# Patient Record
Sex: FEMALE | Race: WHITE | Employment: OTHER | URBAN - METROPOLITAN AREA
[De-identification: names, ages, dates, MRNs, and addresses within clinical notes are randomized per-mention and may not be internally consistent; named-entity substitution may affect disease eponyms.]

---

## 2017-02-09 ENCOUNTER — OFFICE VISIT (OUTPATIENT)
Dept: FAMILY MEDICINE CLINIC | Facility: CLINIC | Age: 57
End: 2017-02-09

## 2017-02-09 VITALS
WEIGHT: 126 LBS | SYSTOLIC BLOOD PRESSURE: 132 MMHG | RESPIRATION RATE: 12 BRPM | DIASTOLIC BLOOD PRESSURE: 74 MMHG | HEART RATE: 72 BPM | TEMPERATURE: 98 F | BODY MASS INDEX: 22.05 KG/M2 | HEIGHT: 63.25 IN

## 2017-02-09 DIAGNOSIS — Z12.31 ENCOUNTER FOR SCREENING MAMMOGRAM FOR MALIGNANT NEOPLASM OF BREAST: ICD-10-CM

## 2017-02-09 DIAGNOSIS — R00.2 PALPITATIONS: ICD-10-CM

## 2017-02-09 DIAGNOSIS — R07.9 CHEST PAIN, UNSPECIFIED TYPE: Primary | ICD-10-CM

## 2017-02-09 DIAGNOSIS — G47.00 INSOMNIA, UNSPECIFIED: ICD-10-CM

## 2017-02-09 DIAGNOSIS — R42 DIZZINESS: ICD-10-CM

## 2017-02-09 PROCEDURE — 93000 ELECTROCARDIOGRAM COMPLETE: CPT | Performed by: FAMILY MEDICINE

## 2017-02-09 PROCEDURE — 99214 OFFICE O/P EST MOD 30 MIN: CPT | Performed by: FAMILY MEDICINE

## 2017-02-09 RX ORDER — ZOLPIDEM TARTRATE 10 MG/1
10 TABLET ORAL NIGHTLY PRN
Qty: 90 TABLET | Refills: 0 | Status: CANCELLED | OUTPATIENT
Start: 2017-02-09

## 2017-02-09 RX ORDER — ZOLPIDEM TARTRATE 10 MG/1
10 TABLET ORAL NIGHTLY PRN
Qty: 90 TABLET | Refills: 0 | Status: SHIPPED | OUTPATIENT
Start: 2017-02-09 | End: 2018-01-26

## 2017-02-09 NOTE — PROGRESS NOTES
Louis Vila is a 60year old female.   CHIEF COMPLAINT   Dizzy and chest pain  HPI:   Dizzy off and on for 5 years - feels off balance when it happens - lasts less than 5 minutes - isn't really able to really verbalize well what she feels when feels the /74 mmHg  Pulse 72  Temp(Src) 98.3 °F (36.8 °C)  Resp 12  Ht 63.25\"  Wt 126 lb  BMI 22.13 kg/m2  GENERAL: well developed, well nourished,in no apparent distress  SKIN: no rashes,no suspicious lesions  NECK: supple,no adenopathy  LUNGS: clear to au

## 2017-02-10 ENCOUNTER — HOSPITAL ENCOUNTER (OUTPATIENT)
Dept: MAMMOGRAPHY | Age: 57
Discharge: HOME | End: 2017-02-10
Attending: FAMILY MEDICINE
Payer: COMMERCIAL

## 2017-02-10 ENCOUNTER — HOSPITAL ENCOUNTER (OUTPATIENT)
Dept: BONE DENSITY | Age: 57
Discharge: HOME | End: 2017-02-10
Attending: FAMILY MEDICINE
Payer: COMMERCIAL

## 2017-02-10 ENCOUNTER — TELEPHONE (OUTPATIENT)
Dept: FAMILY MEDICINE CLINIC | Facility: CLINIC | Age: 57
End: 2017-02-10

## 2017-02-10 DIAGNOSIS — Z13.820 SCREENING FOR OSTEOPOROSIS: ICD-10-CM

## 2017-02-10 DIAGNOSIS — Z12.39 BREAST CANCER SCREENING: ICD-10-CM

## 2017-02-10 PROCEDURE — 77080 DXA BONE DENSITY AXIAL: CPT

## 2017-02-10 PROCEDURE — 77067 SCR MAMMO BI INCL CAD: CPT

## 2017-02-10 NOTE — TELEPHONE ENCOUNTER
Pt was sen yesterday. We called in Zolpidem Tartrate 10 MG Oral Tab to [facility]. The cost is to much so PT cancelled RX. She would like us to call it in to Petey Gomes on file. She also stated [facility] has requested we include a pharmacy cover letter.

## 2017-02-16 ENCOUNTER — TELEPHONE (OUTPATIENT)
Dept: FAMILY MEDICINE CLINIC | Facility: CLINIC | Age: 57
End: 2017-02-16

## 2017-02-16 NOTE — TELEPHONE ENCOUNTER
To call pt when we received her paper chart requested for blood type-A positive. Per pt's request she will  a copy at her next ov 02/21/2017.

## 2017-02-22 ENCOUNTER — HOSPITAL ENCOUNTER (OUTPATIENT)
Dept: CV DIAGNOSTICS | Facility: HOSPITAL | Age: 57
Discharge: HOME | End: 2017-02-22
Attending: FAMILY MEDICINE
Payer: COMMERCIAL

## 2017-02-22 ENCOUNTER — HOSPITAL ENCOUNTER (OUTPATIENT)
Dept: LAB | Facility: HOSPITAL | Age: 57
Discharge: HOME | End: 2017-02-22
Attending: FAMILY MEDICINE
Payer: COMMERCIAL

## 2017-02-22 DIAGNOSIS — R07.9 CHEST PAIN, UNSPECIFIED TYPE: ICD-10-CM

## 2017-02-22 DIAGNOSIS — R00.2 PALPITATIONS: ICD-10-CM

## 2017-02-22 DIAGNOSIS — Z00.00 BLOOD TESTS FOR ROUTINE GENERAL PHYSICAL EXAMINATION: ICD-10-CM

## 2017-02-22 DIAGNOSIS — E55.9 VITAMIN D DEFICIENCY: ICD-10-CM

## 2017-02-22 LAB
ALBUMIN SERPL-MCNC: 3.9 G/DL (ref 3.5–4.8)
ALP LIVER SERPL-CCNC: 86 U/L (ref 46–118)
ALT SERPL-CCNC: 27 U/L (ref 14–54)
AST SERPL-CCNC: 21 U/L (ref 15–41)
BILIRUB SERPL-MCNC: 0.5 MG/DL (ref 0.1–2)
BUN BLD-MCNC: 15 MG/DL (ref 8–20)
CALCIUM BLD-MCNC: 9 MG/DL (ref 8.3–10.3)
CHLORIDE: 102 MMOL/L (ref 101–111)
CHOLEST SMN-MCNC: 178 MG/DL (ref ?–200)
CO2: 30 MMOL/L (ref 22–32)
CREAT BLD-MCNC: 0.74 MG/DL (ref 0.55–1.02)
ERYTHROCYTE [DISTWIDTH] IN BLOOD BY AUTOMATED COUNT: 12.4 % (ref 11.5–16)
GLUCOSE BLD-MCNC: 95 MG/DL (ref 70–99)
HCT VFR BLD AUTO: 42.3 % (ref 34–50)
HDLC SERPL-MCNC: 73 MG/DL (ref 45–?)
HDLC SERPL: 2.44 {RATIO} (ref ?–4.44)
HGB BLD-MCNC: 14.3 G/DL (ref 12–16)
LDLC SERPL CALC-MCNC: 91 MG/DL (ref ?–130)
M PROTEIN MFR SERPL ELPH: 7.3 G/DL (ref 6.1–8.3)
MCH RBC QN AUTO: 31.6 PG (ref 27–33.2)
MCHC RBC AUTO-ENTMCNC: 33.8 G/DL (ref 31–37)
MCV RBC AUTO: 93.4 FL (ref 81–100)
NONHDLC SERPL-MCNC: 105 MG/DL (ref ?–130)
PLATELET # BLD AUTO: 306 10(3)UL (ref 150–450)
POTASSIUM SERPL-SCNC: 4.2 MMOL/L (ref 3.6–5.1)
RBC # BLD AUTO: 4.53 X10(6)UL (ref 3.8–5.1)
RED CELL DISTRIBUTION WIDTH-SD: 42.9 FL (ref 35.1–46.3)
SODIUM SERPL-SCNC: 138 MMOL/L (ref 136–144)
TRIGLYCERIDES: 72 MG/DL (ref ?–150)
TSI SER-ACNC: 1.41 MIU/ML (ref 0.35–5.5)
VLDL: 14 MG/DL (ref 5–40)
WBC # BLD AUTO: 4.9 X10(3) UL (ref 4–13)

## 2017-02-22 PROCEDURE — 93227 XTRNL ECG REC<48 HR R&I: CPT | Performed by: INTERNAL MEDICINE

## 2017-02-22 PROCEDURE — 93350 STRESS TTE ONLY: CPT

## 2017-02-22 PROCEDURE — 93017 CV STRESS TEST TRACING ONLY: CPT

## 2017-02-22 PROCEDURE — 80053 COMPREHEN METABOLIC PANEL: CPT

## 2017-02-22 PROCEDURE — 85027 COMPLETE CBC AUTOMATED: CPT

## 2017-02-22 PROCEDURE — 84443 ASSAY THYROID STIM HORMONE: CPT

## 2017-02-22 PROCEDURE — 93226 XTRNL ECG REC<48 HR SCAN A/R: CPT

## 2017-02-22 PROCEDURE — 36415 COLL VENOUS BLD VENIPUNCTURE: CPT

## 2017-02-22 PROCEDURE — 80061 LIPID PANEL: CPT

## 2017-02-22 PROCEDURE — 93225 XTRNL ECG REC<48 HRS REC: CPT

## 2017-02-22 PROCEDURE — 93350 STRESS TTE ONLY: CPT | Performed by: INTERNAL MEDICINE

## 2017-02-22 PROCEDURE — 82306 VITAMIN D 25 HYDROXY: CPT

## 2017-02-22 PROCEDURE — 93018 CV STRESS TEST I&R ONLY: CPT | Performed by: INTERNAL MEDICINE

## 2017-02-23 DIAGNOSIS — E55.9 VITAMIN D DEFICIENCY: Primary | ICD-10-CM

## 2017-02-23 LAB — 25-HYDROXYVITAMIN D (TOTAL): 26.3 NG/ML (ref 30–100)

## 2017-02-27 ENCOUNTER — TELEPHONE (OUTPATIENT)
Dept: FAMILY MEDICINE CLINIC | Facility: CLINIC | Age: 57
End: 2017-02-27

## 2017-02-27 DIAGNOSIS — R00.2 PALPITATIONS: ICD-10-CM

## 2017-02-27 DIAGNOSIS — R07.9 CHEST PAIN, UNSPECIFIED TYPE: Primary | ICD-10-CM

## 2017-02-28 ENCOUNTER — OFFICE VISIT (OUTPATIENT)
Dept: FAMILY MEDICINE CLINIC | Facility: CLINIC | Age: 57
End: 2017-02-28

## 2017-02-28 DIAGNOSIS — M81.0 OSTEOPOROSIS: Primary | ICD-10-CM

## 2017-02-28 PROCEDURE — 99214 OFFICE O/P EST MOD 30 MIN: CPT | Performed by: FAMILY MEDICINE

## 2017-02-28 NOTE — PATIENT INSTRUCTIONS
Zoledronic Acid injection (Paget's Disease, Osteoporosis)  What is this medicine? ZOLEDRONIC ACID (HANSA lynn AS id) lowers the amount of calcium loss from bone. It is used to treat Paget's disease and osteoporosis in women.   How should I use this m This drug is given in a hospital or clinic and will not be stored at home. What should I tell my health care provider before I take this medicine?   They need to know if you have any of these conditions:  · aspirin-sensitive asthma  · cancer, especially if Tell your dentist and dental surgeon that you are taking this medicine. You should not have major dental surgery while on this medicine. See your dentist to have a dental exam and fix any dental problems before starting this medicine.  Take good care of you If you miss a dose, take it as soon as you can. If it is almost time for your next dose, take only that dose. Do not take double or extra doses. Where should I keep my medicine? Keep out of the reach of children.   Store at room temperature between 15 and This medicine does not prevent hot flashes. It may cause hot flashes in some patients at the start of therapy. Date Last Reviewed:   NOTE:This sheet is a summary. It may not cover all possible information.  If you have questions about this medicine, talk t

## 2017-02-28 NOTE — PROGRESS NOTES
Kojo Cabral is a 60year old female. CHIEF COMPLAINT   Review dexa scan  HPI:   In about 2006, tried fosamax daily. Couldn't tolerate - caused vomiting.     Thinks she tried actonel also when it was once daily (knows for sure she tried a second oral -

## 2017-03-02 ENCOUNTER — TELEPHONE (OUTPATIENT)
Dept: FAMILY MEDICINE CLINIC | Facility: CLINIC | Age: 57
End: 2017-03-02

## 2017-03-02 ENCOUNTER — TELEPHONE (OUTPATIENT)
Dept: HEMATOLOGY/ONCOLOGY | Facility: HOSPITAL | Age: 57
End: 2017-03-02

## 2017-03-02 VITALS
HEIGHT: 63.25 IN | SYSTOLIC BLOOD PRESSURE: 122 MMHG | HEART RATE: 72 BPM | WEIGHT: 125 LBS | DIASTOLIC BLOOD PRESSURE: 76 MMHG | TEMPERATURE: 98 F | RESPIRATION RATE: 16 BRPM | BODY MASS INDEX: 21.87 KG/M2

## 2017-03-02 PROBLEM — M81.0 SENILE OSTEOPOROSIS: Status: ACTIVE | Noted: 2017-03-02

## 2017-03-02 NOTE — TELEPHONE ENCOUNTER
Called pt. Back and gave her the codes CPT codes for injection. She will be contacting her insurance company.

## 2017-03-02 NOTE — TELEPHONE ENCOUNTER
Pt called asking about her pre authorization that she had been waiting on. She said the hospital was calling her to schedule her surgery, but before she does that, she would like to know how much the insurance agreed to cover, and what her copy will be.  Pl

## 2017-03-07 ENCOUNTER — PRIOR ORIGINAL RECORDS (OUTPATIENT)
Dept: OTHER | Age: 57
End: 2017-03-07

## 2017-03-09 ENCOUNTER — TELEPHONE (OUTPATIENT)
Dept: HEMATOLOGY/ONCOLOGY | Facility: HOSPITAL | Age: 57
End: 2017-03-09

## 2017-03-10 ENCOUNTER — OFFICE VISIT (OUTPATIENT)
Dept: HEMATOLOGY/ONCOLOGY | Facility: HOSPITAL | Age: 57
End: 2017-03-10
Attending: FAMILY MEDICINE
Payer: COMMERCIAL

## 2017-03-10 VITALS
HEART RATE: 74 BPM | RESPIRATION RATE: 16 BRPM | OXYGEN SATURATION: 100 % | WEIGHT: 126.63 LBS | DIASTOLIC BLOOD PRESSURE: 75 MMHG | SYSTOLIC BLOOD PRESSURE: 132 MMHG | TEMPERATURE: 98 F | BODY MASS INDEX: 22 KG/M2

## 2017-03-10 DIAGNOSIS — M81.0 SENILE OSTEOPOROSIS: ICD-10-CM

## 2017-03-10 DIAGNOSIS — M81.0 OSTEOPOROSIS: Primary | ICD-10-CM

## 2017-03-10 LAB
ALBUMIN SERPL-MCNC: 3.9 G/DL (ref 3.5–4.8)
CALCIUM BLD-MCNC: 9 MG/DL (ref 8.3–10.3)
CREAT BLD-MCNC: 0.76 MG/DL (ref 0.55–1.02)

## 2017-03-10 PROCEDURE — 82565 ASSAY OF CREATININE: CPT

## 2017-03-10 PROCEDURE — 82040 ASSAY OF SERUM ALBUMIN: CPT

## 2017-03-10 PROCEDURE — 96365 THER/PROPH/DIAG IV INF INIT: CPT

## 2017-03-10 PROCEDURE — 82310 ASSAY OF CALCIUM: CPT

## 2017-03-10 RX ORDER — ZOLEDRONIC ACID 5 MG/100ML
5 INJECTION, SOLUTION INTRAVENOUS ONCE
Status: COMPLETED | OUTPATIENT
Start: 2017-03-10 | End: 2017-03-10

## 2017-03-10 RX ADMIN — ZOLEDRONIC ACID 5 MG: 5 INJECTION, SOLUTION INTRAVENOUS at 10:05:00

## 2017-03-10 NOTE — PATIENT INSTRUCTIONS
Zoledronic Acid injection (Paget's Disease, Osteoporosis)  What is this medicine? ZOLEDRONIC ACID (HANSA lynn AS id) lowers the amount of calcium loss from bone. It is used to treat Paget's disease and osteoporosis in women.   How should I use this m This drug is given in a hospital or clinic and will not be stored at home. What should I tell my health care provider before I take this medicine?   They need to know if you have any of these conditions:  · aspirin-sensitive asthma  · cancer, especially if Tell your dentist and dental surgeon that you are taking this medicine. You should not have major dental surgery while on this medicine. See your dentist to have a dental exam and fix any dental problems before starting this medicine.  Take good care of you

## 2017-03-10 NOTE — PROGRESS NOTES
Pt arrived for infusion of reclast, pt states has stage 2 osteoporosis, pt unable to to take oral meds do to severe stomach problems when taking, pt denies any adverse S/S currently and appears alert and in nad, pt ambulates with steady gait, unable to DR DAMON [facility]

## 2017-04-24 ENCOUNTER — TELEPHONE (OUTPATIENT)
Dept: FAMILY MEDICINE CLINIC | Facility: CLINIC | Age: 57
End: 2017-04-24

## 2017-04-24 RX ORDER — METHYLPREDNISOLONE 4 MG/1
TABLET ORAL
Qty: 1 KIT | Refills: 0 | Status: SHIPPED | OUTPATIENT
Start: 2017-04-24 | End: 2018-01-26 | Stop reason: ALTCHOICE

## 2017-04-24 RX ORDER — MOMETASONE FUROATE 1 MG/G
CREAM TOPICAL
Qty: 15 G | Refills: 0 | Status: SHIPPED | OUTPATIENT
Start: 2017-04-24 | End: 2018-01-26 | Stop reason: ALTCHOICE

## 2017-04-24 NOTE — TELEPHONE ENCOUNTER
Pt was notified Dr. Ramiro Durand authorized a medrol 4 mg dosepak. Prescription was sent to the pharmacy.

## 2017-04-24 NOTE — TELEPHONE ENCOUNTER
Pt is wondering if she can get a RX called in for poison Ivy. She states she gets it every year from yard work and Dr Dee Metzger usually just call in a Rx for her. Please advise. Thank you.

## 2017-04-24 NOTE — TELEPHONE ENCOUNTER
Called to pt to advise that the mometasone cream would be called into her pharmacy. Pt stated that the cream, that she has left over from last time, is not helping pt at this time.  Said that last time she got poision ivy she was given prednisone along wit

## 2017-05-13 ENCOUNTER — HOSPITAL ENCOUNTER (OUTPATIENT)
Dept: CT IMAGING | Facility: HOSPITAL | Age: 57
Discharge: HOME | End: 2017-05-13
Attending: INTERNAL MEDICINE

## 2017-05-13 VITALS — HEART RATE: 77 BPM | SYSTOLIC BLOOD PRESSURE: 130 MMHG | DIASTOLIC BLOOD PRESSURE: 63 MMHG

## 2017-05-13 DIAGNOSIS — Z13.89 ENCOUNTER FOR SCREENING FOR OTHER DISORDER: ICD-10-CM

## 2017-05-13 NOTE — IMAGING NOTE
Pt having chest pain 2 episodes so far this year last one in April. Pt see Dr Shashi Morocho cardiologist on Tuesday at [facility]. Calcium score =0  Pt had labs total vzlblk=040 hdl=62 wer=242 trg=82 and glucose=97 family hx father had 5 bypasses .  fabi

## 2017-05-16 ENCOUNTER — PRIOR ORIGINAL RECORDS (OUTPATIENT)
Dept: OTHER | Age: 57
End: 2017-05-16

## 2017-05-16 ENCOUNTER — MYAURORA ACCOUNT LINK (OUTPATIENT)
Dept: OTHER | Age: 57
End: 2017-05-16

## 2018-01-09 NOTE — Clinical Note
Hi,  This patient has had extensive cervical spine surgery.  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  I cannot fully explain her symptoms on the R.  I referred her to you to try an injection for the radiculopathy but if that does not help, would you be able to get her in for an EMG?  I can place the order now if you prefer but I was hoping she could try an injection first  Kelly Pt discharge teaching taught via teach back method with pt and father. Ace wrap applied to left ankle. Pt and father voiced understanding on checking for pulses and voiced understanding on monitoring for numbness and tingling. No other concern voiced.      Erwin Brown RN  01/09/18 [id number]

## 2018-01-26 ENCOUNTER — OFFICE VISIT (OUTPATIENT)
Dept: FAMILY MEDICINE CLINIC | Facility: CLINIC | Age: 58
End: 2018-01-26

## 2018-01-26 VITALS
RESPIRATION RATE: 12 BRPM | HEART RATE: 72 BPM | DIASTOLIC BLOOD PRESSURE: 70 MMHG | TEMPERATURE: 98 F | BODY MASS INDEX: 21.17 KG/M2 | WEIGHT: 124 LBS | SYSTOLIC BLOOD PRESSURE: 104 MMHG | HEIGHT: 64 IN

## 2018-01-26 DIAGNOSIS — Z11.51 SCREENING FOR HPV (HUMAN PAPILLOMAVIRUS): ICD-10-CM

## 2018-01-26 DIAGNOSIS — R29.898 RIGHT ARM WEAKNESS: ICD-10-CM

## 2018-01-26 DIAGNOSIS — Z00.00 BLOOD TESTS FOR ROUTINE GENERAL PHYSICAL EXAMINATION: ICD-10-CM

## 2018-01-26 DIAGNOSIS — M79.601 RIGHT ARM PAIN: ICD-10-CM

## 2018-01-26 DIAGNOSIS — Z12.39 BREAST CANCER SCREENING: ICD-10-CM

## 2018-01-26 DIAGNOSIS — M54.2 NECK PAIN: ICD-10-CM

## 2018-01-26 DIAGNOSIS — Z12.4 PAP SMEAR FOR CERVICAL CANCER SCREENING: ICD-10-CM

## 2018-01-26 DIAGNOSIS — Z00.00 ROUTINE GENERAL MEDICAL EXAMINATION AT A HEALTH CARE FACILITY: Primary | ICD-10-CM

## 2018-01-26 PROCEDURE — 87624 HPV HI-RISK TYP POOLED RSLT: CPT | Performed by: FAMILY MEDICINE

## 2018-01-26 PROCEDURE — 88175 CYTOPATH C/V AUTO FLUID REDO: CPT | Performed by: FAMILY MEDICINE

## 2018-01-26 PROCEDURE — 99396 PREV VISIT EST AGE 40-64: CPT | Performed by: FAMILY MEDICINE

## 2018-01-26 PROCEDURE — 99213 OFFICE O/P EST LOW 20 MIN: CPT | Performed by: FAMILY MEDICINE

## 2018-01-26 RX ORDER — TRAZODONE HYDROCHLORIDE 50 MG/1
50 TABLET ORAL NIGHTLY
Qty: 90 TABLET | Refills: 3 | Status: ON HOLD | OUTPATIENT
Start: 2018-01-26 | End: 2018-06-26

## 2018-01-26 NOTE — PROGRESS NOTES
CHIEF COMPLAINT       Annual Physical Exam  HPI:   Lucas Hunter is a 59year old female who presents for a complete physical exam.   Would like to see a neurosurgeon for issues in her neck. She reports she has a history of \"bone-on-bone\" in her neck. Apply pea sized amount to external vaginal area at HS. Disp: 42.5 g Rfl: 11        Iodine (Topical)        Hives, Tongue Swelling    Comment:Fish iodine   No past medical history on file.    Past Surgical History:  2006, 1988: BREAST SURGERY PROCEDURE [place] Days Alcohol use:  Yes              Comment: rarely, holidays, maybe one per month          REVIEW OF SYSTEMS:   GENERAL: feels well otherwise  SKIN: denies any unusual skin lesions  EYES:denies blurred vision or double vision  HEENT: denies nasal congestion explained. Pt' s weight is Body mass index is 21.28 kg/m². Jose Luis Boggs Recommended low fat diet and aerobic exercise 30 minutes three times weekly. The patient indicates understanding of these issues and agrees to the plan.   Routine general medical examination at a

## 2018-01-30 LAB — HPV I/H RISK 1 DNA SPEC QL NAA+PROBE: NEGATIVE

## 2018-02-06 ENCOUNTER — TELEPHONE (OUTPATIENT)
Dept: SURGERY | Facility: CLINIC | Age: 58
End: 2018-02-06

## 2018-02-13 ENCOUNTER — TELEPHONE (OUTPATIENT)
Dept: FAMILY MEDICINE CLINIC | Facility: CLINIC | Age: 58
End: 2018-02-13

## 2018-02-13 NOTE — TELEPHONE ENCOUNTER
Was given request for refill from [facility] [facility] for pt's Zoledronic Acid 5MG/100ML (Reclast) by provider, RADHIKA Davis.   Sadie Vides wants to know if this medication will be sent to the cancer center and not to our office, as this medication is an infus

## 2018-02-16 ENCOUNTER — TELEPHONE (OUTPATIENT)
Dept: FAMILY MEDICINE CLINIC | Facility: CLINIC | Age: 58
End: 2018-02-16

## 2018-02-16 NOTE — TELEPHONE ENCOUNTER
Kirsten/Stephanie Dressler working on this, started 2/13/2018, both are out of office today, I do not see paperwork I will F/U on Monday 2/19/2018, when Nicho Ragland return to office patient aware.

## 2018-02-19 NOTE — TELEPHONE ENCOUNTER
order faxed to [facility]. Outgoing call to Sandra Mesa at [phone] to check the status of Order/pre Authorization. I spoke with Hima Gates, they ran test claim, and member ID/group number is invalid, advised I contact patient for updated information.

## 2018-02-20 NOTE — TELEPHONE ENCOUNTER
Pt transferred to nurse. She said Nayeli Baker did not receive the order that we faxed yesterday. Tita will re-fax.

## 2018-02-23 PROBLEM — M81.0 SENILE OSTEOPOROSIS: Status: ACTIVE | Noted: 2018-02-23

## 2018-03-07 ENCOUNTER — LAB ENCOUNTER (OUTPATIENT)
Dept: LAB | Facility: HOSPITAL | Age: 58
End: 2018-03-07
Attending: FAMILY MEDICINE
Payer: COMMERCIAL

## 2018-03-07 DIAGNOSIS — Z00.00 BLOOD TESTS FOR ROUTINE GENERAL PHYSICAL EXAMINATION: ICD-10-CM

## 2018-03-07 LAB
25-HYDROXYVITAMIN D (TOTAL): 29.9 NG/ML (ref 30–100)
ALBUMIN SERPL-MCNC: 3.8 G/DL (ref 3.5–4.8)
ALP LIVER SERPL-CCNC: 58 U/L (ref 46–118)
ALT SERPL-CCNC: 42 U/L (ref 14–54)
AST SERPL-CCNC: 31 U/L (ref 15–41)
BASOPHILS # BLD AUTO: 0.02 X10(3) UL (ref 0–0.1)
BASOPHILS NFR BLD AUTO: 0.5 %
BILIRUB SERPL-MCNC: 0.6 MG/DL (ref 0.1–2)
BUN BLD-MCNC: 21 MG/DL (ref 8–20)
CALCIUM BLD-MCNC: 8.8 MG/DL (ref 8.3–10.3)
CHLORIDE: 104 MMOL/L (ref 101–111)
CHOLEST SMN-MCNC: 211 MG/DL (ref ?–200)
CO2: 29 MMOL/L (ref 22–32)
CREAT BLD-MCNC: 0.73 MG/DL (ref 0.55–1.02)
EOSINOPHIL # BLD AUTO: 0.12 X10(3) UL (ref 0–0.3)
EOSINOPHIL NFR BLD AUTO: 2.7 %
ERYTHROCYTE [DISTWIDTH] IN BLOOD BY AUTOMATED COUNT: 12.8 % (ref 11.5–16)
GLUCOSE BLD-MCNC: 92 MG/DL (ref 70–99)
HCT VFR BLD AUTO: 42.4 % (ref 34–50)
HDLC SERPL-MCNC: 70 MG/DL (ref 45–?)
HDLC SERPL: 3.01 {RATIO} (ref ?–4.44)
HGB BLD-MCNC: 14.1 G/DL (ref 12–16)
IMMATURE GRANULOCYTE COUNT: 0.02 X10(3) UL (ref 0–1)
IMMATURE GRANULOCYTE RATIO %: 0.5 %
LDLC SERPL CALC-MCNC: 123 MG/DL (ref ?–130)
LYMPHOCYTES # BLD AUTO: 1.51 X10(3) UL (ref 0.9–4)
LYMPHOCYTES NFR BLD AUTO: 34.6 %
M PROTEIN MFR SERPL ELPH: 7.2 G/DL (ref 6.1–8.3)
MCH RBC QN AUTO: 31.4 PG (ref 27–33.2)
MCHC RBC AUTO-ENTMCNC: 33.3 G/DL (ref 31–37)
MCV RBC AUTO: 94.4 FL (ref 81–100)
MONOCYTES # BLD AUTO: 0.28 X10(3) UL (ref 0.1–1)
MONOCYTES NFR BLD AUTO: 6.4 %
NEUTROPHIL ABS PRELIM: 2.42 X10 (3) UL (ref 1.3–6.7)
NEUTROPHILS # BLD AUTO: 2.42 X10(3) UL (ref 1.3–6.7)
NEUTROPHILS NFR BLD AUTO: 55.3 %
NONHDLC SERPL-MCNC: 141 MG/DL (ref ?–130)
PLATELET # BLD AUTO: 229 10(3)UL (ref 150–450)
POTASSIUM SERPL-SCNC: 4.7 MMOL/L (ref 3.6–5.1)
RBC # BLD AUTO: 4.49 X10(6)UL (ref 3.8–5.1)
RED CELL DISTRIBUTION WIDTH-SD: 44.2 FL (ref 35.1–46.3)
SODIUM SERPL-SCNC: 139 MMOL/L (ref 136–144)
TRIGL SERPL-MCNC: 90 MG/DL (ref ?–150)
TSI SER-ACNC: 2.51 MIU/ML (ref 0.35–5.5)
VLDLC SERPL CALC-MCNC: 18 MG/DL (ref 5–40)
WBC # BLD AUTO: 4.4 X10(3) UL (ref 4–13)

## 2018-03-07 PROCEDURE — 80050 GENERAL HEALTH PANEL: CPT

## 2018-03-07 PROCEDURE — 80061 LIPID PANEL: CPT

## 2018-03-07 PROCEDURE — 82306 VITAMIN D 25 HYDROXY: CPT

## 2018-03-07 PROCEDURE — 36415 COLL VENOUS BLD VENIPUNCTURE: CPT

## 2018-03-12 ENCOUNTER — HOSPITAL ENCOUNTER (OUTPATIENT)
Dept: MAMMOGRAPHY | Age: 58
Discharge: HOME | End: 2018-03-12
Attending: FAMILY MEDICINE
Payer: COMMERCIAL

## 2018-03-12 ENCOUNTER — OFFICE VISIT (OUTPATIENT)
Dept: HEMATOLOGY/ONCOLOGY | Facility: HOSPITAL | Age: 58
End: 2018-03-12
Attending: FAMILY MEDICINE
Payer: COMMERCIAL

## 2018-03-12 DIAGNOSIS — M81.0 SENILE OSTEOPOROSIS: Primary | ICD-10-CM

## 2018-03-12 DIAGNOSIS — Z12.39 BREAST CANCER SCREENING: ICD-10-CM

## 2018-03-12 PROCEDURE — 77067 SCR MAMMO BI INCL CAD: CPT | Performed by: FAMILY MEDICINE

## 2018-03-12 PROCEDURE — 96365 THER/PROPH/DIAG IV INF INIT: CPT

## 2018-03-12 RX ORDER — ZOLEDRONIC ACID 5 MG/100ML
5 INJECTION, SOLUTION INTRAVENOUS ONCE
Status: COMPLETED | OUTPATIENT
Start: 2018-03-12 | End: 2018-03-12

## 2018-03-12 RX ORDER — ZOLEDRONIC ACID 5 MG/100ML
5 INJECTION, SOLUTION INTRAVENOUS ONCE
Status: CANCELLED | OUTPATIENT
Start: 2018-03-12

## 2018-03-12 RX ADMIN — ZOLEDRONIC ACID 5 MG: 5 INJECTION, SOLUTION INTRAVENOUS at 13:30:00

## 2018-03-12 NOTE — PROGRESS NOTES
Education Record    Learner:  Patient    Disease / Diagnosis: Oseteoporosis     Barriers / Limitations:  None   Comments:    Method:  Brief focused   Comments:    General Topics:  Plan of care reviewed   Comments:    Outcome:  Shows understanding   Comment

## 2018-04-16 ENCOUNTER — HOSPITAL ENCOUNTER (OUTPATIENT)
Dept: MRI IMAGING | Facility: HOSPITAL | Age: 58
Discharge: HOME | End: 2018-04-16
Attending: FAMILY MEDICINE
Payer: COMMERCIAL

## 2018-04-16 DIAGNOSIS — M54.2 NECK PAIN: ICD-10-CM

## 2018-04-16 DIAGNOSIS — M79.601 RIGHT ARM PAIN: ICD-10-CM

## 2018-04-16 DIAGNOSIS — R29.898 RIGHT ARM WEAKNESS: ICD-10-CM

## 2018-04-16 PROCEDURE — 72141 MRI NECK SPINE W/O DYE: CPT | Performed by: FAMILY MEDICINE

## 2018-04-19 ENCOUNTER — HOSPITAL ENCOUNTER (OUTPATIENT)
Dept: GENERAL RADIOLOGY | Facility: HOSPITAL | Age: 58
Discharge: HOME | End: 2018-04-19
Attending: PHYSICIAN ASSISTANT
Payer: COMMERCIAL

## 2018-04-19 ENCOUNTER — OFFICE VISIT (OUTPATIENT)
Dept: SURGERY | Facility: CLINIC | Age: 58
End: 2018-04-19

## 2018-04-19 VITALS — HEART RATE: 88 BPM | SYSTOLIC BLOOD PRESSURE: 118 MMHG | DIASTOLIC BLOOD PRESSURE: 60 MMHG

## 2018-04-19 DIAGNOSIS — M47.22 CERVICAL SPONDYLOSIS WITH RADICULOPATHY: Primary | ICD-10-CM

## 2018-04-19 DIAGNOSIS — M47.22 CERVICAL SPONDYLOSIS WITH RADICULOPATHY: ICD-10-CM

## 2018-04-19 PROCEDURE — 99243 OFF/OP CNSLTJ NEW/EST LOW 30: CPT | Performed by: PHYSICIAN ASSISTANT

## 2018-04-19 PROCEDURE — 72052 X-RAY EXAM NECK SPINE 6/>VWS: CPT | Performed by: PHYSICIAN ASSISTANT

## 2018-04-19 RX ORDER — MELOXICAM 15 MG/1
15 TABLET ORAL DAILY
Qty: 30 TABLET | Refills: 1 | Status: ON HOLD | OUTPATIENT
Start: 2018-04-19 | End: 2018-06-05

## 2018-04-19 RX ORDER — GABAPENTIN 100 MG/1
CAPSULE ORAL
Qty: 90 CAPSULE | Refills: 0 | Status: SHIPPED | OUTPATIENT
Start: 2018-04-19 | End: 2018-05-17

## 2018-04-19 NOTE — H&P
Neurosurgery Clinic Visit  2018    Michael Wheeler PCP:  Sheridan Huston MD    1960 MRN [id number]       CHIEF COMPLAINT:  Patient presents with:  Neck Pain: NP      HISTORY OF PRESENT ILLNESS:  Michael Wheeler is a(n) 59year old female who Surgical History:  1998, 2006: BREAST SURGERY PROCEDURE UNLISTED      Comment: Breast Enlargement, Fibroid?   5/5/2015: COLONOSCOPY,BIOPSY N/A      Comment: Procedure: COLONOSCOPY, POSSIBLE BIOPSY,                POSSIBLE POLYPECTOMY 80031;  Surgeon: Daniela Zepeda normocephalic. The eyes, ears, nose and throat are clear. The pupils are equal, round, and reactive to light. Hearing is intact and symmetric. Neck: Reveals no masses. Breast:  Exam is deferred.   Skin:  Exam reveals no obvious lesions or other indicat Able to heel-to-toe walk. Spine:  Neck pain on flexion and extension. Nontender to palpation over cervical/lumbar area without spasms. Negative Spurling's. Negative L'Hermitte's. REVIEW OF STUDIES:  Imaging studies were personally reviewed.     MR

## 2018-04-19 NOTE — PROGRESS NOTES
Location of Pain: neck pain radiating down into the arms with numbness and tingling in the fingers, worse on the right side    Date Pain Began: yrs worsening 3 weeks ago          Work Related:   No        Receiving Work Comp/Disability:   No    Numeric Rat

## 2018-04-19 NOTE — PATIENT INSTRUCTIONS
Refill policies:    • Allow 2-3 business days for refills; controlled substances may take longer.   • Contact your pharmacy at least 5 days prior to running out of medication and have them send an electronic request or submit request through the [facility] for the entire amount billed. Precertification and Prior Authorizations  If your physician has recommended that you have a procedure or additional testing performed.   [facility]) will contact your insurance carrier to obtain pr

## 2018-05-03 ENCOUNTER — OFFICE VISIT (OUTPATIENT)
Dept: NEUROLOGY | Facility: CLINIC | Age: 58
End: 2018-05-03

## 2018-05-03 DIAGNOSIS — G56.03 BILATERAL CARPAL TUNNEL SYNDROME: Primary | ICD-10-CM

## 2018-05-03 DIAGNOSIS — G56.20 ULNAR NERVE ENTRAPMENT AT ELBOW, UNSPECIFIED LATERALITY: ICD-10-CM

## 2018-05-03 DIAGNOSIS — M50.122 CERVICAL DISC DISORDER AT C5-C6 LEVEL WITH RADICULOPATHY: ICD-10-CM

## 2018-05-03 PROCEDURE — 95886 MUSC TEST DONE W/N TEST COMP: CPT | Performed by: OTHER

## 2018-05-03 PROCEDURE — 95910 NRV CNDJ TEST 7-8 STUDIES: CPT | Performed by: OTHER

## 2018-05-03 NOTE — PROCEDURES
[facility] with [address]  [phone]    Fax  [phone]    Electrophysiologic Consultation      Patient: Jean Johnson      5/3/2018 Wrist ADM 2.65 7.1 100 Wrist - ADM 8       B. Elbow ADM 5.90 9.0 127 B. Elbow - Wrist 20 61.5      A. Elbow ADM 8.05 8.9 125 A. Elbow - B. Elbow 11.5 53.5   L ULNAR - ADM      Wrist ADM 2.30 8.0 100 Wrist - ADM 8       B. Elbow ADM 5.55 8.0 101 B. Elbow - Carlos Garb

## 2018-05-03 NOTE — PROGRESS NOTES
Here for EMG    RESULTS:  1. The median distal motor latencies bilaterally showed normal responses with normal amplitudes and conduction velocities.   2.  Ulnar velocities across the elbow segments were reduced but this was not associated with any reductio

## 2018-05-14 ENCOUNTER — TELEPHONE (OUTPATIENT)
Dept: SURGERY | Facility: CLINIC | Age: 58
End: 2018-05-14

## 2018-05-14 NOTE — TELEPHONE ENCOUNTER
Sent messge to pt regarding EMG results. She has appt tomorrow, so we will discuss treatment options then.

## 2018-05-15 ENCOUNTER — OFFICE VISIT (OUTPATIENT)
Dept: SURGERY | Facility: CLINIC | Age: 58
End: 2018-05-15

## 2018-05-15 ENCOUNTER — TELEPHONE (OUTPATIENT)
Dept: SURGERY | Facility: CLINIC | Age: 58
End: 2018-05-15

## 2018-05-15 VITALS — HEART RATE: 68 BPM | DIASTOLIC BLOOD PRESSURE: 78 MMHG | SYSTOLIC BLOOD PRESSURE: 120 MMHG

## 2018-05-15 DIAGNOSIS — M50.122 CERVICAL DISC DISORDER AT C5-C6 LEVEL WITH RADICULOPATHY: Primary | ICD-10-CM

## 2018-05-15 PROCEDURE — 99213 OFFICE O/P EST LOW 20 MIN: CPT | Performed by: NEUROLOGICAL SURGERY

## 2018-05-15 NOTE — H&P
Neurosurgery Clinic Visit  5/15/2018    Lucas Hunter PCP:  Daniela Zepeda MD    1960 MRN [id number]     HISTORY OF PRESENT ILLNESS:  Lucas Hunter is a(n) 59year old female is here for follow-up  She complains of right arm pain  Times arm f

## 2018-05-15 NOTE — PROGRESS NOTES
Here to review EMG/X-ray results and discuss treatment options. Pain is slightly improved but due mainly to immobility.

## 2018-05-15 NOTE — TELEPHONE ENCOUNTER
Patient scheduled for C4-6 ACDF on 6/4/18 with Dr. Alma Mariee. Pre-op instructions discussed with patient and surgical packet provided:    · You will need to contact the Pre-admission department at [phone] to schedule your pre-op testing.   They will before you are discharged        from the hospital  · Post operative appointment to be made 2 weeks after surgery.      PA needed  ICD-10--M50.122  CPT--61201, [id number], [address], [id number]

## 2018-05-15 NOTE — PATIENT INSTRUCTIONS
Refill policies:    • Allow 2-3 business days for refills; controlled substances may take longer.   • Contact your pharmacy at least 5 days prior to running out of medication and have them send an electronic request or submit request through the “request re entire amount billed. Precertification and Prior Authorizations: If your physician has recommended that you have a procedure or additional testing performed.   [facility]) will contact your insurance carrier to obtain pre-certi informed about a spine class as it related to your surgery. Although the class is not mandatory, your are strongly encouraged to attend. Information for the spine class is provided below. · The blood work will  include MRSA/MSSA testing.  If positive you spine class as it relates to your surgery. Although the class is not mandatory, you are strongly encouraged to attend.  Make sure to bring your surgical binder to the class    The Pre-op Spine Surgery Class is held at [facility] most Wednesdays from 4:

## 2018-05-17 RX ORDER — MULTIVIT-MIN/IRON FUM/FOLIC AC 7.5 MG-4
1 TABLET ORAL DAILY
Status: ON HOLD | COMMUNITY
End: 2018-06-26

## 2018-05-17 RX ORDER — SODIUM CHLORIDE, SODIUM LACTATE, POTASSIUM CHLORIDE, CALCIUM CHLORIDE 600; 310; 30; 20 MG/100ML; MG/100ML; MG/100ML; MG/100ML
INJECTION, SOLUTION INTRAVENOUS CONTINUOUS
Status: CANCELLED | OUTPATIENT
Start: 2018-05-17

## 2018-05-18 ENCOUNTER — TELEPHONE (OUTPATIENT)
Dept: SURGERY | Facility: CLINIC | Age: 58
End: 2018-05-18

## 2018-05-18 RX ORDER — GABAPENTIN 100 MG/1
200 CAPSULE ORAL 3 TIMES DAILY
Qty: 180 CAPSULE | Refills: 0 | Status: SHIPPED | OUTPATIENT
Start: 2018-05-18 | End: 2018-06-21

## 2018-05-18 NOTE — TELEPHONE ENCOUNTER
Pt states she was told at last OV by RN that an rx would be sent to her, also has questions on anti-inflamatory meds

## 2018-05-18 NOTE — TELEPHONE ENCOUNTER
Pt states that since she can not take her meloxicam for pain since she will be having surgery soon, she needs a refill on her gabapentin.     She states she takes the 200mg at a time TID

## 2018-05-21 NOTE — TELEPHONE ENCOUNTER
Received call from [facility]. Surgery has been approved for upcoming date 6/4/18. Authorized as outpatient surgery  Auth # [id number]    Nothing else needed for this upcoming surgery.

## 2018-05-30 ENCOUNTER — HOSPITAL ENCOUNTER (OUTPATIENT)
Dept: PHYSICAL THERAPY | Facility: HOSPITAL | Age: 58
Discharge: HOME | End: 2018-05-30
Attending: NEUROLOGICAL SURGERY
Payer: COMMERCIAL

## 2018-05-30 ENCOUNTER — TELEPHONE (OUTPATIENT)
Dept: SURGERY | Facility: CLINIC | Age: 58
End: 2018-05-30

## 2018-05-30 ENCOUNTER — LABORATORY ENCOUNTER (OUTPATIENT)
Dept: LAB | Facility: HOSPITAL | Age: 58
End: 2018-05-30
Attending: NEUROLOGICAL SURGERY
Payer: COMMERCIAL

## 2018-05-30 DIAGNOSIS — M50.122 CERVICAL DISC DISORDER AT C5-C6 LEVEL WITH RADICULOPATHY: ICD-10-CM

## 2018-05-30 PROCEDURE — 87081 CULTURE SCREEN ONLY: CPT

## 2018-05-30 PROCEDURE — 80048 BASIC METABOLIC PNL TOTAL CA: CPT

## 2018-05-30 PROCEDURE — 85610 PROTHROMBIN TIME: CPT

## 2018-05-30 PROCEDURE — 85025 COMPLETE CBC W/AUTO DIFF WBC: CPT

## 2018-05-30 PROCEDURE — 36415 COLL VENOUS BLD VENIPUNCTURE: CPT

## 2018-05-30 PROCEDURE — 85730 THROMBOPLASTIN TIME PARTIAL: CPT

## 2018-06-03 ENCOUNTER — ANESTHESIA EVENT (OUTPATIENT)
Dept: SURGERY | Facility: HOSPITAL | Age: 58
End: 2018-06-03

## 2018-06-03 NOTE — ANESTHESIA PREPROCEDURE EVALUATION
PRE-OP EVALUATION    Patient Name: Ani Bingham    Pre-op Diagnosis: Cervical disc disorder at C5-C6 level with radiculopathy [M50.122]    Procedure(s):  CERVICAL 4 - CERVICAL 5, CERVICAL 5 - CERVICAL 6  ANTERIOR CERVICAL DISCECTOMY AND FUSION WITH INSTR N/A      Comment: Procedure: COLONOSCOPY, POSSIBLE BIOPSY,                POSSIBLE POLYPECTOMY 08721;  Surgeon: Fanny Contreras MD;  Location: [address] [place] [address]  5/5/15: Kimber Ruelas      Comment: 2 splenic Plan/risks discussed with: patient, spouse, mother and child/children                Present on Admission:  **None**

## 2018-06-04 ENCOUNTER — APPOINTMENT (OUTPATIENT)
Dept: GENERAL RADIOLOGY | Facility: HOSPITAL | Age: 58
DRG: 473 | End: 2018-06-04
Attending: NEUROLOGICAL SURGERY
Payer: COMMERCIAL

## 2018-06-04 ENCOUNTER — SURGERY (OUTPATIENT)
Age: 58
End: 2018-06-04

## 2018-06-04 ENCOUNTER — HOSPITAL ENCOUNTER (INPATIENT)
Facility: HOSPITAL | Age: 58
LOS: 2 days | Discharge: HOME | DRG: 473 | End: 2018-06-06
Attending: NEUROLOGICAL SURGERY | Admitting: NEUROLOGICAL SURGERY
Payer: COMMERCIAL

## 2018-06-04 ENCOUNTER — ANESTHESIA (OUTPATIENT)
Dept: SURGERY | Facility: HOSPITAL | Age: 58
End: 2018-06-04

## 2018-06-04 DIAGNOSIS — M50.122 CERVICAL DISC DISORDER AT C5-C6 LEVEL WITH RADICULOPATHY: Primary | ICD-10-CM

## 2018-06-04 PROCEDURE — 76001 XR FLUOROSCOPE EXAM >1 HR EXTENSIVE (CPT=76001): CPT | Performed by: NEUROLOGICAL SURGERY

## 2018-06-04 PROCEDURE — 01N10ZZ RELEASE CERVICAL NERVE, OPEN APPROACH: ICD-10-PCS | Performed by: NEUROLOGICAL SURGERY

## 2018-06-04 PROCEDURE — 0RG20K0 FUSION OF 2 OR MORE CERVICAL VERTEBRAL JOINTS WITH NONAUTOLOGOUS TISSUE SUBSTITUTE, ANTERIOR APPROACH, ANTERIOR COLUMN, OPEN APPROACH: ICD-10-PCS | Performed by: NEUROLOGICAL SURGERY

## 2018-06-04 PROCEDURE — 99251 INITIAL INPATIENT CONSULT,LEVL I: CPT | Performed by: HOSPITALIST

## 2018-06-04 PROCEDURE — 0RT30ZZ RESECTION OF CERVICAL VERTEBRAL DISC, OPEN APPROACH: ICD-10-PCS | Performed by: NEUROLOGICAL SURGERY

## 2018-06-04 DEVICE — GRAFT BN ALLOCRAFT CANC LRDTC: Type: IMPLANTABLE DEVICE | Site: NECK | Status: FUNCTIONAL

## 2018-06-04 DEVICE — TWO-LEVEL PLATE
Type: IMPLANTABLE DEVICE | Site: NECK | Status: FUNCTIONAL
Brand: REFLEX HYBRID

## 2018-06-04 RX ORDER — HYDROMORPHONE HYDROCHLORIDE 1 MG/ML
0.5 INJECTION, SOLUTION INTRAMUSCULAR; INTRAVENOUS; SUBCUTANEOUS ONCE
Status: DISCONTINUED | OUTPATIENT
Start: 2018-06-04 | End: 2018-06-04 | Stop reason: HOSPADM

## 2018-06-04 RX ORDER — DIPHENHYDRAMINE HYDROCHLORIDE 50 MG/ML
25 INJECTION INTRAMUSCULAR; INTRAVENOUS EVERY 4 HOURS PRN
Status: DISCONTINUED | OUTPATIENT
Start: 2018-06-04 | End: 2018-06-06

## 2018-06-04 RX ORDER — HYDROMORPHONE HYDROCHLORIDE 1 MG/ML
0.5 INJECTION, SOLUTION INTRAMUSCULAR; INTRAVENOUS; SUBCUTANEOUS EVERY 5 MIN PRN
Status: DISCONTINUED | OUTPATIENT
Start: 2018-06-04 | End: 2018-06-04 | Stop reason: HOSPADM

## 2018-06-04 RX ORDER — CEFAZOLIN SODIUM/WATER 2 G/20 ML
SYRINGE (ML) INTRAVENOUS
Status: DISPENSED
Start: 2018-06-04 | End: 2018-06-04

## 2018-06-04 RX ORDER — SODIUM CHLORIDE AND POTASSIUM CHLORIDE .9; .15 G/100ML; G/100ML
75 SOLUTION INTRAVENOUS CONTINUOUS
Status: DISCONTINUED | OUTPATIENT
Start: 2018-06-04 | End: 2018-06-06

## 2018-06-04 RX ORDER — HYDROCODONE BITARTRATE AND ACETAMINOPHEN 10; 325 MG/1; MG/1
2 TABLET ORAL EVERY 4 HOURS PRN
Status: DISCONTINUED | OUTPATIENT
Start: 2018-06-04 | End: 2018-06-06

## 2018-06-04 RX ORDER — SODIUM CHLORIDE, SODIUM LACTATE, POTASSIUM CHLORIDE, CALCIUM CHLORIDE 600; 310; 30; 20 MG/100ML; MG/100ML; MG/100ML; MG/100ML
INJECTION, SOLUTION INTRAVENOUS CONTINUOUS
Status: DISCONTINUED | OUTPATIENT
Start: 2018-06-04 | End: 2018-06-04 | Stop reason: HOSPADM

## 2018-06-04 RX ORDER — NALOXONE HYDROCHLORIDE 0.4 MG/ML
80 INJECTION, SOLUTION INTRAMUSCULAR; INTRAVENOUS; SUBCUTANEOUS AS NEEDED
Status: DISCONTINUED | OUTPATIENT
Start: 2018-06-04 | End: 2018-06-04 | Stop reason: HOSPADM

## 2018-06-04 RX ORDER — MULTIPLE VITAMINS W/ MINERALS TAB 9MG-400MCG
1 TAB ORAL DAILY
Status: DISCONTINUED | OUTPATIENT
Start: 2018-06-04 | End: 2018-06-06

## 2018-06-04 RX ORDER — BISACODYL 10 MG
10 SUPPOSITORY, RECTAL RECTAL
Status: DISCONTINUED | OUTPATIENT
Start: 2018-06-04 | End: 2018-06-06

## 2018-06-04 RX ORDER — HYDROMORPHONE HYDROCHLORIDE 1 MG/ML
0.2 INJECTION, SOLUTION INTRAMUSCULAR; INTRAVENOUS; SUBCUTANEOUS EVERY 2 HOUR PRN
Status: DISCONTINUED | OUTPATIENT
Start: 2018-06-04 | End: 2018-06-06

## 2018-06-04 RX ORDER — CEFAZOLIN SODIUM/WATER 2 G/20 ML
2 SYRINGE (ML) INTRAVENOUS EVERY 8 HOURS
Status: COMPLETED | OUTPATIENT
Start: 2018-06-04 | End: 2018-06-04

## 2018-06-04 RX ORDER — POLYETHYLENE GLYCOL 3350 17 G/17G
17 POWDER, FOR SOLUTION ORAL DAILY PRN
Status: DISCONTINUED | OUTPATIENT
Start: 2018-06-04 | End: 2018-06-06

## 2018-06-04 RX ORDER — GABAPENTIN 100 MG/1
200 CAPSULE ORAL 3 TIMES DAILY
Status: DISCONTINUED | OUTPATIENT
Start: 2018-06-04 | End: 2018-06-06

## 2018-06-04 RX ORDER — HYDROMORPHONE HYDROCHLORIDE 1 MG/ML
INJECTION, SOLUTION INTRAMUSCULAR; INTRAVENOUS; SUBCUTANEOUS
Status: COMPLETED
Start: 2018-06-04 | End: 2018-06-04

## 2018-06-04 RX ORDER — ESTRADIOL 0.1 MG/G
CREAM VAGINAL DAILY
Status: DISCONTINUED | OUTPATIENT
Start: 2018-06-04 | End: 2018-06-06

## 2018-06-04 RX ORDER — BUPIVACAINE HYDROCHLORIDE AND EPINEPHRINE 2.5; 5 MG/ML; UG/ML
INJECTION, SOLUTION EPIDURAL; INFILTRATION; INTRACAUDAL; PERINEURAL AS NEEDED
Status: DISCONTINUED | OUTPATIENT
Start: 2018-06-04 | End: 2018-06-04 | Stop reason: HOSPADM

## 2018-06-04 RX ORDER — DIPHENHYDRAMINE HYDROCHLORIDE 50 MG/ML
12.5 INJECTION INTRAMUSCULAR; INTRAVENOUS AS NEEDED
Status: DISCONTINUED | OUTPATIENT
Start: 2018-06-04 | End: 2018-06-04 | Stop reason: HOSPADM

## 2018-06-04 RX ORDER — MEPERIDINE HYDROCHLORIDE 25 MG/ML
12.5 INJECTION INTRAMUSCULAR; INTRAVENOUS; SUBCUTANEOUS AS NEEDED
Status: DISCONTINUED | OUTPATIENT
Start: 2018-06-04 | End: 2018-06-04 | Stop reason: HOSPADM

## 2018-06-04 RX ORDER — HYDROMORPHONE HYDROCHLORIDE 1 MG/ML
0.4 INJECTION, SOLUTION INTRAMUSCULAR; INTRAVENOUS; SUBCUTANEOUS EVERY 2 HOUR PRN
Status: DISCONTINUED | OUTPATIENT
Start: 2018-06-04 | End: 2018-06-06

## 2018-06-04 RX ORDER — MIDAZOLAM HYDROCHLORIDE 1 MG/ML
1 INJECTION INTRAMUSCULAR; INTRAVENOUS EVERY 5 MIN PRN
Status: DISCONTINUED | OUTPATIENT
Start: 2018-06-04 | End: 2018-06-04 | Stop reason: HOSPADM

## 2018-06-04 RX ORDER — CEFAZOLIN SODIUM/WATER 2 G/20 ML
2 SYRINGE (ML) INTRAVENOUS ONCE
Status: DISCONTINUED | OUTPATIENT
Start: 2018-06-04 | End: 2018-06-04 | Stop reason: HOSPADM

## 2018-06-04 RX ORDER — ONDANSETRON 2 MG/ML
4 INJECTION INTRAMUSCULAR; INTRAVENOUS AS NEEDED
Status: DISCONTINUED | OUTPATIENT
Start: 2018-06-04 | End: 2018-06-04 | Stop reason: HOSPADM

## 2018-06-04 RX ORDER — CYCLOBENZAPRINE HCL 10 MG
10 TABLET ORAL 3 TIMES DAILY PRN
Status: DISCONTINUED | OUTPATIENT
Start: 2018-06-04 | End: 2018-06-06

## 2018-06-04 RX ORDER — HYDROMORPHONE HYDROCHLORIDE 1 MG/ML
0.8 INJECTION, SOLUTION INTRAMUSCULAR; INTRAVENOUS; SUBCUTANEOUS EVERY 2 HOUR PRN
Status: DISCONTINUED | OUTPATIENT
Start: 2018-06-04 | End: 2018-06-06

## 2018-06-04 RX ORDER — HYDROCODONE BITARTRATE AND ACETAMINOPHEN 10; 325 MG/1; MG/1
1 TABLET ORAL EVERY 4 HOURS PRN
Status: DISCONTINUED | OUTPATIENT
Start: 2018-06-04 | End: 2018-06-06

## 2018-06-04 RX ORDER — ACETAMINOPHEN 325 MG/1
650 TABLET ORAL EVERY 4 HOURS PRN
Status: DISCONTINUED | OUTPATIENT
Start: 2018-06-04 | End: 2018-06-06

## 2018-06-04 RX ORDER — TRAZODONE HYDROCHLORIDE 50 MG/1
50 TABLET ORAL NIGHTLY
Status: DISCONTINUED | OUTPATIENT
Start: 2018-06-04 | End: 2018-06-06

## 2018-06-04 RX ORDER — ACETAMINOPHEN 500 MG
1000 TABLET ORAL ONCE
Status: ON HOLD | COMMUNITY
End: 2018-06-26

## 2018-06-04 RX ORDER — ONDANSETRON 2 MG/ML
4 INJECTION INTRAMUSCULAR; INTRAVENOUS EVERY 4 HOURS PRN
Status: DISPENSED | OUTPATIENT
Start: 2018-06-04 | End: 2018-06-05

## 2018-06-04 RX ORDER — DIPHENHYDRAMINE HCL 25 MG
25 CAPSULE ORAL EVERY 4 HOURS PRN
Status: DISCONTINUED | OUTPATIENT
Start: 2018-06-04 | End: 2018-06-06

## 2018-06-04 RX ORDER — SODIUM PHOSPHATE, DIBASIC AND SODIUM PHOSPHATE, MONOBASIC 7; 19 G/133ML; G/133ML
1 ENEMA RECTAL ONCE AS NEEDED
Status: DISCONTINUED | OUTPATIENT
Start: 2018-06-04 | End: 2018-06-06

## 2018-06-04 RX ORDER — METOCLOPRAMIDE HYDROCHLORIDE 5 MG/ML
10 INJECTION INTRAMUSCULAR; INTRAVENOUS AS NEEDED
Status: DISCONTINUED | OUTPATIENT
Start: 2018-06-04 | End: 2018-06-04 | Stop reason: HOSPADM

## 2018-06-04 RX ORDER — DOCUSATE SODIUM 100 MG/1
100 CAPSULE, LIQUID FILLED ORAL 2 TIMES DAILY
Status: DISCONTINUED | OUTPATIENT
Start: 2018-06-04 | End: 2018-06-06

## 2018-06-04 NOTE — INTERVAL H&P NOTE
Pre-op Diagnosis: Cervical disc disorder at C5-C6 level with radiculopathy [M50.122]    The above referenced H&P was reviewed by Waldo Leija PA-C on 6/4/2018, the patient was examined and no significant changes have occurred in the patient's condition

## 2018-06-04 NOTE — BRIEF OP NOTE
Pre-Operative Diagnosis: Cervical disc disorder at C5-C6 level with radiculopathy [M50.122]     Post-Operative Diagnosis: Cervical disc disorder at C5-C6 level with radiculopathy [M50.122]      Procedure Performed:   Procedure(s):  CERVICAL 4 - CERVICAL 5,

## 2018-06-04 NOTE — H&P (VIEW-ONLY)
Neurosurgery Clinic Visit  5/15/2018    Michelle Mitul PCP:  Issac Ruiz MD    1960 MRN [id number]     HISTORY OF PRESENT ILLNESS:  Michelle Bauman is a(n) 59year old female is here for follow-up  She complains of right arm pain  Times arm f

## 2018-06-04 NOTE — ANESTHESIA POSTPROCEDURE EVALUATION
[facility] Patient Status:  Outpatient in a Bed   Age/Gender 59year old female MRN [id number]   [facility] SURGERY Attending Carlin Singh MD   Hosp Day # 0 PCP Paul Hoffmann MD       Anesthesia Post-op Note

## 2018-06-04 NOTE — OPERATIVE REPORT
Patient Name: Carmela Paige  8/27/1960 6/4/18  Preoperative Diagnosis: Cervical radicular pain,  Cervical disc disorder at C5-C6 level with radiculopathy [M50.122]  Postoperative Diagnosis: same   Primary Surgeon: Jackie Eid M.D.    Assistant: as c6.  The disc space was opened with a scalpel blade. The discectomy was performed with kerrisons, pituitaries, and curettes. A nerve hook was used to confirm decompression in all directions. The end plates were prepped.   A 6 mm bone plug was measured and p

## 2018-06-05 RX ORDER — HYDROCODONE BITARTRATE AND ACETAMINOPHEN 10; 325 MG/1; MG/1
1 TABLET ORAL
Qty: 60 TABLET | Refills: 0 | Status: SHIPPED | OUTPATIENT
Start: 2018-06-05 | End: 2018-06-21

## 2018-06-05 RX ORDER — PSEUDOEPHEDRINE HCL 30 MG
100 TABLET ORAL 2 TIMES DAILY
Qty: 60 CAPSULE | Refills: 0 | Status: SHIPPED | OUTPATIENT
Start: 2018-06-05 | End: 2018-12-13 | Stop reason: ALTCHOICE

## 2018-06-05 RX ORDER — DIPHENHYDRAMINE HCL 25 MG
25 CAPSULE ORAL EVERY 4 HOURS PRN
Qty: 60 CAPSULE | Refills: 0 | Status: SHIPPED | OUTPATIENT
Start: 2018-06-05 | End: 2018-06-19 | Stop reason: ALTCHOICE

## 2018-06-05 RX ORDER — CYCLOBENZAPRINE HCL 10 MG
10 TABLET ORAL 3 TIMES DAILY PRN
Qty: 60 TABLET | Refills: 0 | Status: SHIPPED | OUTPATIENT
Start: 2018-06-05 | End: 2018-06-21

## 2018-06-05 NOTE — OCCUPATIONAL THERAPY NOTE
OCCUPATIONAL THERAPY QUICK EVALUATION - INPATIENT    Room Number: 367/367-A  Evaluation Date: 6/5/2018     Type of Evaluation: Quick Eval  Presenting Problem:  (C4-6 ACDF)    Physician Order: IP Consult to Occupational Therapy  Reason for Therapy:  ADL/IAD self-stated goal is to go home today    OBJECTIVE  Precautions: Spine (Aspen Collar)  Fall Risk: Standard fall risk    WEIGHT BEARING RESTRICTION  Weight Bearing Restriction: None                PAIN ASSESSMENT  Rating: Unable to rate  Location:  (R latera stairs. OT verbally educated pt, spouse on car transfer techniques. OT recommended pt to have supervision for showers initially and to use shower chair and to sit by mirror or have assist for hair care to help maintain spine precautions.  Pt left in spine D OT Discharge Recommendations: Home (family assistance during recovery)  OT Device Recommendations: Long-handled sponge; Shower chair    PLAN   Patient has been evaluated and presents with no skilled Occupational Therapy needs at this time.   Patient disc

## 2018-06-05 NOTE — PROGRESS NOTES
[facility]  Neurosurgery Progress Note  2018    Phyllis Forrester Patient Status:  Inpatient    1960 MRN [id number]   [facility] 3SW-A Attending Deborah Weaver MD   Hosp Day # 1 PCP Karyn Purvis MD     SUBJECTIVE:  Rashida Zarate

## 2018-06-05 NOTE — PHYSICAL THERAPY NOTE
PHYSICAL THERAPY QUICK EVALUATION - INPATIENT    Room Number: 367/367-A  Evaluation Date: 6/5/2018  Presenting Problem: C4-C6 ACDF 6/4/18  Physician Order: PT Eval and Treat    Problem List  Active Problems:    Cervical disc disorder at C5-C6 level with strength are within functional limits    Lower extremity ROM is within functional limits    Lower extremity strength is within functional limits    NEUROLOGICAL FINDINGS                      AM-PAC '6-Clicks' INPATIENT SHORT FORM - BASIC MOBILITY  How much discussion regarding integration of spine precuations with functional activities. Patient End of Session: With [facility] staff; All patient questions and concerns addressed (Left in Discharge class)    ASSESSMENT   Patient is a 59year old female admitted on 10

## 2018-06-05 NOTE — PROGRESS NOTES
POD #1 spine newsletter and swallowing precautions provided to patient. Reviewed indications, side effects of pain medication/narcotics and constipation prevention.  Stressed importance of increased fluids/roughage in diet, continued use stool softeners gabriel

## 2018-06-05 NOTE — PHYSICAL THERAPY NOTE
PT orders received. Attempted to see pt for PT eval, however pt currently does not have Laquey collar brace at this time. Will attempt to see pt later as time allows. RN notified.  CM

## 2018-06-05 NOTE — PROGRESS NOTES
Patient is doing well, no new issues. She does have a rash near surgical site, not pruritic. Steroid cream vs monitoring if ok with Dr. Fortunato Nolasco. No active medical issues, will dc follow up.     Hossein Pena MD  [facility]

## 2018-06-05 NOTE — PAYOR COMM NOTE
--------------  ADMISSION REVIEW     Payor: LEATHA KHAN  Subscriber #:  [id number]  Authorization Number: [id number]    Admit date: 6/4/18  Admit time: 12       Admitting Physician: Geena Zepeda MD  Attending Physician:  Geena Zepeda MD  P MG/ML injection 0.4 mg     Date Action Dose Route User    6/5/2018 0757 Given 0.4 mg Intravenous Michelle Guy RN    6/5/2018 0551 Given 0.4 mg Intravenous Pilar Massing, RN    6/5/2018 0215 Given 0.4 mg Intravenous Pilar Massing, RN    6/4/2018

## 2018-06-05 NOTE — CONSULTS
[facility] Patient Status:  Inpatient    1960 MRN [id number]   [facility] 3SW-A Attending Rosa Madrid MD   Hosp Day # 0 PCP Shanon Waller MD     Reason for consult:med mgt    Requeste from AMI   • Heart Disorder Paternal Uncle 39      from AMI   • Cancer Paternal Uncle 50      from lung cancer   • Heart Disorder Paternal Uncle 45     CABG   • Heart Disorder Paternal Uncle 55      from CAD   • Diabetes Paternal Uncle    • Hea WBC  5.1  5.8   HGB  12.9  12.3   MCV  94.6  95.7   PLT  226.0  185.0   INR  1.04   --        Recent Labs   Lab  05/30/18   1521   GLU  94   BUN  17   CREATSERUM  0.69   GFRAA  110   GFRNAA  96   CA  8.9   NA  141   K  4.7   CL  108   CO2  29.0       Rec

## 2018-06-05 NOTE — PROGRESS NOTES
Patient and   attended half of discharge spine education/snack class. Escorted patient back to room due to pain and pt requesting need to lay down. Family did not want to remain for class. RN informed.

## 2018-06-06 VITALS
DIASTOLIC BLOOD PRESSURE: 57 MMHG | RESPIRATION RATE: 18 BRPM | SYSTOLIC BLOOD PRESSURE: 118 MMHG | WEIGHT: 125 LBS | OXYGEN SATURATION: 98 % | HEART RATE: 76 BPM | BODY MASS INDEX: 21.34 KG/M2 | HEIGHT: 64 IN | TEMPERATURE: 98 F

## 2018-06-06 NOTE — PROGRESS NOTES
Patient and  declined to re-attend discharge class as they left mid-way [facility] yesterday due to pain. Reviewed incision care, showering, restrictions, and when to contact surgeon with patient and  at bedside.  They requested to watch dischar

## 2018-06-06 NOTE — PROGRESS NOTES
[facility]  Neurosurgery Progress Note  2018    Sonna Needle Patient Status:  Inpatient    1960 MRN [id number]   [facility] 3SW-A Attending Keegan Aquino MD   Hosp Day # 2 PCP Sylvester Kate MD     SUBJECTIVE:  Kaila Pierre

## 2018-06-06 NOTE — PLAN OF CARE
PAIN - ADULT    • Verbalizes/displays adequate comfort level or patient's stated pain goal Progressing        Patient/Family Goals    • Patient/Family Short Term Goal Progressing        desatting to 88% on room air while asleep.  02 2 lnc applied, 02 sat 9

## 2018-06-06 NOTE — PLAN OF CARE
Awake, a/o x4.  02 sat 100% on room air. States feels much better at this time. Denies difficulty swallowing. States wants to go home, awaiting prescriptions to be delivered from pharmacy.

## 2018-06-06 NOTE — PAYOR COMM NOTE
--------------  CONTINUED STAY REVIEW    Payor: [facility]  Subscriber #:  [id number]  Authorization Number: [id number]    Admit date: 6/4/18  Admit time: 12    Admitting Physician: Heri Alejandro MD  Attending Physician:  Heri Alejandro MD 6/6/2018 0527 Given 2 tablet Oral Nokomis Sandra, RN    6/6/2018 0039 Given 2 tablet Oral Can Flores, RN    6/5/2018 2046 Given 2 tablet Oral Can Sandra, RN    6/5/2018 1338 Given 2 tablet Oral Gricel George, RN      HYDROmorphone HCl (DI

## 2018-06-07 NOTE — PAYOR COMM NOTE
--------------  DISCHARGE REVIEW    Payor: LEATHA KHAN  Subscriber #:  [id number]  Authorization Number: [id number]    Admit date: 6/4/18  Admit time:  9346  Discharge Date: 6/6/2018  1:30 PM     Admitting Physician: Henrietta Daley MD  Attending Physi

## 2018-06-08 ENCOUNTER — TELEPHONE (OUTPATIENT)
Dept: SURGERY | Facility: CLINIC | Age: 58
End: 2018-06-08

## 2018-06-08 NOTE — TELEPHONE ENCOUNTER
Pt calling, states she may allergic to the foam in the cervical collar. Pt states she has never been allergic to anything before, however since sx, every time she wears the collar she has extreme itching in cervical area.   Originally after sx it was al

## 2018-06-11 ENCOUNTER — TELEPHONE (OUTPATIENT)
Dept: SURGERY | Facility: CLINIC | Age: 58
End: 2018-06-11

## 2018-06-11 DIAGNOSIS — Z98.1 S/P CERVICAL SPINAL FUSION: ICD-10-CM

## 2018-06-11 DIAGNOSIS — M50.122 CERVICAL DISC DISORDER AT C5-C6 LEVEL WITH RADICULOPATHY: Primary | ICD-10-CM

## 2018-06-11 NOTE — TELEPHONE ENCOUNTER
Spoke with [address] [address] who stated patient came in to their clinic today as the [employer] was causing irritation. Patient was fitted for a Eagle J collar and patient was much more comfortable in this.  Gabrielle will need an order for this if  i

## 2018-06-11 NOTE — TELEPHONE ENCOUNTER
Received denial information for previous surgery done on 6/4/18. Surgery was authorized as outpatient surgery. [facility] #: [id number]. See referral pool.     Denial information faxed over to Kisha Araiza Supervisor, Financial Clearance fax #: [phone]

## 2018-06-13 ENCOUNTER — PATIENT OUTREACH (OUTPATIENT)
Dept: CASE MANAGEMENT | Age: 58
End: 2018-06-13

## 2018-06-13 NOTE — PROGRESS NOTES
Initial Post Discharge Follow Up   Discharge Date: 6/6/18  Contact Date: 6/13/2018    Consent Verification:  Assessment Completed With: Patient  HIPAA Verified?   Yes    Discharge Dx:    Cervical disc disorder at C5-C6 level with radiculopathy, s/p C4/5, Appointment Department [facility])    Jun 19, 2018  1:00 PM CDT Postop with Esperanza Alonzo MD [address] [place] Desiree Reece ([facility])        [address] [facility], [address] [place] [address]

## 2018-06-19 ENCOUNTER — OFFICE VISIT (OUTPATIENT)
Dept: SURGERY | Facility: CLINIC | Age: 58
End: 2018-06-19

## 2018-06-19 VITALS — DIASTOLIC BLOOD PRESSURE: 60 MMHG | SYSTOLIC BLOOD PRESSURE: 118 MMHG | HEART RATE: 76 BPM

## 2018-06-19 DIAGNOSIS — Z98.1 S/P CERVICAL SPINAL FUSION: Primary | ICD-10-CM

## 2018-06-19 DIAGNOSIS — M47.22 CERVICAL SPONDYLOSIS WITH RADICULOPATHY: ICD-10-CM

## 2018-06-19 PROCEDURE — 99024 POSTOP FOLLOW-UP VISIT: CPT | Performed by: PHYSICIAN ASSISTANT

## 2018-06-19 NOTE — PROGRESS NOTES
Neurosurgery Clinic Visit  2018    Amy Conrad PCP:  Yamila Ascencio MD    1960 MRN [id number]     HISTORY OF PRESENT ILLNESS:  Amy Conrad is a(n) 59year old female who is here 2 weeks s/p 2 level ACDF. She is feeling well.   She fe

## 2018-06-19 NOTE — PATIENT INSTRUCTIONS
Refill policies:    • Allow 2-3 business days for refills; controlled substances may take longer.   • Contact your pharmacy at least 5 days prior to running out of medication and have them send an electronic request or submit request through the “request re entire amount billed. Precertification and Prior Authorizations: If your physician has recommended that you have a procedure or additional testing performed.   [facility]) will contact your insurance carrier to obtain pre-certi

## 2018-06-21 RX ORDER — CYCLOBENZAPRINE HCL 10 MG
10 TABLET ORAL 3 TIMES DAILY PRN
Qty: 60 TABLET | Refills: 0 | Status: ON HOLD | OUTPATIENT
Start: 2018-06-21 | End: 2018-06-26

## 2018-06-21 RX ORDER — GABAPENTIN 100 MG/1
200 CAPSULE ORAL 3 TIMES DAILY
Qty: 180 CAPSULE | Refills: 0 | Status: ON HOLD | OUTPATIENT
Start: 2018-06-21 | End: 2018-06-26

## 2018-06-21 RX ORDER — HYDROCODONE BITARTRATE AND ACETAMINOPHEN 10; 325 MG/1; MG/1
1 TABLET ORAL
Qty: 60 TABLET | Refills: 0 | Status: SHIPPED | OUTPATIENT
Start: 2018-06-21 | End: 2018-09-20 | Stop reason: ALTCHOICE

## 2018-06-21 NOTE — TELEPHONE ENCOUNTER
Medication: Hydrocodone  mg, Gabapentin 100 mg, Cyclobenzaprine 10 mg    Date of last refill: Hydrocodone refilled on 6/6/18 # 60, Gabapentin refilled on 5/18/18 #180, Cyclobenzaprine refilled on 6/6/18 #60  [place]

## 2018-06-21 NOTE — TELEPHONE ENCOUNTER
Gabapentin and hydrocodone, Cyclobenzaprine (pt states she will need it before the weekend also not sure if she will be the one to pick script up.  Informed pt she will have to notify office if family/friend will pick script up)  Pt has been informed of 48h

## 2018-06-25 ENCOUNTER — HOSPITAL ENCOUNTER (INPATIENT)
Facility: HOSPITAL | Age: 58
LOS: 1 days | Discharge: HOME | DRG: 641 | End: 2018-06-26
Attending: EMERGENCY MEDICINE | Admitting: INTERNAL MEDICINE
Payer: COMMERCIAL

## 2018-06-25 ENCOUNTER — APPOINTMENT (OUTPATIENT)
Dept: GENERAL RADIOLOGY | Facility: HOSPITAL | Age: 58
DRG: 641 | End: 2018-06-25
Attending: EMERGENCY MEDICINE
Payer: COMMERCIAL

## 2018-06-25 DIAGNOSIS — R11.11 VOMITING WITHOUT NAUSEA, INTRACTABILITY OF VOMITING NOT SPECIFIED, UNSPECIFIED VOMITING TYPE: ICD-10-CM

## 2018-06-25 DIAGNOSIS — R79.89 ELEVATED LACTIC ACID LEVEL: Primary | ICD-10-CM

## 2018-06-25 DIAGNOSIS — R41.82 ALTERED MENTAL STATUS, UNSPECIFIED ALTERED MENTAL STATUS TYPE: ICD-10-CM

## 2018-06-25 LAB
BASOPHILS # BLD AUTO: 0.03 X10(3) UL (ref 0–0.1)
BASOPHILS NFR BLD AUTO: 0.4 %
EOSINOPHIL # BLD AUTO: 0.02 X10(3) UL (ref 0–0.3)
EOSINOPHIL NFR BLD AUTO: 0.3 %
ERYTHROCYTE [DISTWIDTH] IN BLOOD BY AUTOMATED COUNT: 12 % (ref 11.5–16)
HCT VFR BLD AUTO: 39.2 % (ref 34–50)
HGB BLD-MCNC: 13.3 G/DL (ref 12–16)
IMMATURE GRANULOCYTE COUNT: 0.02 X10(3) UL (ref 0–1)
IMMATURE GRANULOCYTE RATIO %: 0.3 %
LYMPHOCYTES # BLD AUTO: 1.15 X10(3) UL (ref 0.9–4)
LYMPHOCYTES NFR BLD AUTO: 16.7 %
MCH RBC QN AUTO: 30.6 PG (ref 27–33.2)
MCHC RBC AUTO-ENTMCNC: 33.9 G/DL (ref 31–37)
MCV RBC AUTO: 90.3 FL (ref 81–100)
MONOCYTES # BLD AUTO: 0.13 X10(3) UL (ref 0.1–1)
MONOCYTES NFR BLD AUTO: 1.9 %
NEUTROPHIL ABS PRELIM: 5.53 X10 (3) UL (ref 1.3–6.7)
NEUTROPHILS # BLD AUTO: 5.53 X10(3) UL (ref 1.3–6.7)
NEUTROPHILS NFR BLD AUTO: 80.4 %
PLATELET # BLD AUTO: 375 10(3)UL (ref 150–450)
RBC # BLD AUTO: 4.34 X10(6)UL (ref 3.8–5.1)
RED CELL DISTRIBUTION WIDTH-SD: 40.1 FL (ref 35.1–46.3)
WBC # BLD AUTO: 6.9 X10(3) UL (ref 4–13)

## 2018-06-25 PROCEDURE — 71045 X-RAY EXAM CHEST 1 VIEW: CPT | Performed by: EMERGENCY MEDICINE

## 2018-06-26 ENCOUNTER — APPOINTMENT (OUTPATIENT)
Dept: CT IMAGING | Facility: HOSPITAL | Age: 58
DRG: 641 | End: 2018-06-26
Attending: EMERGENCY MEDICINE
Payer: COMMERCIAL

## 2018-06-26 VITALS
HEART RATE: 75 BPM | RESPIRATION RATE: 20 BRPM | OXYGEN SATURATION: 100 % | DIASTOLIC BLOOD PRESSURE: 59 MMHG | SYSTOLIC BLOOD PRESSURE: 129 MMHG | HEIGHT: 64 IN | TEMPERATURE: 99 F | BODY MASS INDEX: 22.16 KG/M2 | WEIGHT: 129.81 LBS

## 2018-06-26 PROBLEM — R41.82 ALTERED MENTAL STATUS, UNSPECIFIED ALTERED MENTAL STATUS TYPE: Status: ACTIVE | Noted: 2018-06-26

## 2018-06-26 PROBLEM — R79.89 ELEVATED LACTIC ACID LEVEL: Status: ACTIVE | Noted: 2018-06-26

## 2018-06-26 PROBLEM — R11.11 VOMITING WITHOUT NAUSEA, INTRACTABILITY OF VOMITING NOT SPECIFIED, UNSPECIFIED VOMITING TYPE: Status: ACTIVE | Noted: 2018-06-26

## 2018-06-26 LAB
ALBUMIN SERPL-MCNC: 3.8 G/DL (ref 3.5–4.8)
ALP LIVER SERPL-CCNC: 87 U/L (ref 46–118)
ALT SERPL-CCNC: 23 U/L (ref 14–54)
AMMONIA: <10 UMOL/L (ref 11–32)
AMPHETAMINE URINE: NEGATIVE
AST SERPL-CCNC: 17 U/L (ref 15–41)
ATRIAL RATE: 122 BPM
BARBITURATES URINE: NEGATIVE
BENZODIAZEPINES URINE: NEGATIVE
BILIRUB SERPL-MCNC: 0.2 MG/DL (ref 0.1–2)
BILIRUB UR QL STRIP.AUTO: NEGATIVE
BUN BLD-MCNC: 10 MG/DL (ref 8–20)
BUN BLD-MCNC: 18 MG/DL (ref 8–20)
CALCIUM BLD-MCNC: 7.8 MG/DL (ref 8.3–10.3)
CALCIUM BLD-MCNC: 9 MG/DL (ref 8.3–10.3)
CHLORIDE: 105 MMOL/L (ref 101–111)
CHLORIDE: 112 MMOL/L (ref 101–111)
CLARITY UR REFRACT.AUTO: CLEAR
CO2: 22 MMOL/L (ref 22–32)
CO2: 23 MMOL/L (ref 22–32)
COCAINE URINE: NEGATIVE
CREAT BLD-MCNC: 0.56 MG/DL (ref 0.55–1.02)
CREAT BLD-MCNC: 0.91 MG/DL (ref 0.55–1.02)
EST. AVERAGE GLUCOSE BLD GHB EST-MCNC: 111 MG/DL (ref 68–126)
ETHYL ALCOHOL, QUALITATIVE: NEGATIVE
ETHYL ALCOHOL: <3 MG/DL (ref ?–3)
GLUCOSE BLD-MCNC: 118 MG/DL (ref 70–99)
GLUCOSE BLD-MCNC: 192 MG/DL (ref 70–99)
GLUCOSE UR STRIP.AUTO-MCNC: 50 MG/DL
HBA1C MFR BLD HPLC: 5.5 % (ref ?–5.7)
INR BLD: 1.06 (ref 0.9–1.1)
LACTIC ACID: 1.4 MMOL/L (ref 0.5–2)
LACTIC ACID: 4.3 MMOL/L (ref 0.5–2)
LEUKOCYTE ESTERASE UR QL STRIP.AUTO: NEGATIVE
M PROTEIN MFR SERPL ELPH: 7.4 G/DL (ref 6.1–8.3)
NITRITE UR QL STRIP.AUTO: NEGATIVE
P AXIS: 60 DEGREES
P-R INTERVAL: 148 MS
PCP URINE: NEGATIVE
PH UR STRIP.AUTO: 7 [PH] (ref 4.5–8)
POTASSIUM SERPL-SCNC: 3.7 MMOL/L (ref 3.6–5.1)
POTASSIUM SERPL-SCNC: 4.1 MMOL/L (ref 3.6–5.1)
POTASSIUM SERPL-SCNC: 4.2 MMOL/L (ref 3.6–5.1)
PROT UR STRIP.AUTO-MCNC: NEGATIVE MG/DL
PSA SERPL DL<=0.01 NG/ML-MCNC: 14.2 SECONDS (ref 12.4–14.7)
Q-T INTERVAL: 332 MS
QRS DURATION: 94 MS
QTC CALCULATION (BEZET): 473 MS
R AXIS: 88 DEGREES
RBC UR QL AUTO: NEGATIVE
SODIUM SERPL-SCNC: 138 MMOL/L (ref 136–144)
SODIUM SERPL-SCNC: 143 MMOL/L (ref 136–144)
SP GR UR STRIP.AUTO: 1.01 (ref 1–1.03)
T AXIS: 54 DEGREES
TROPONIN: <0.046 NG/ML (ref ?–0.05)
UROBILINOGEN UR STRIP.AUTO-MCNC: <2 MG/DL
VENTRICULAR RATE: 122 BPM

## 2018-06-26 PROCEDURE — 72125 CT NECK SPINE W/O DYE: CPT | Performed by: EMERGENCY MEDICINE

## 2018-06-26 PROCEDURE — 99235 HOSP IP/OBS SAME DATE MOD 70: CPT | Performed by: INTERNAL MEDICINE

## 2018-06-26 PROCEDURE — 74176 CT ABD & PELVIS W/O CONTRAST: CPT | Performed by: EMERGENCY MEDICINE

## 2018-06-26 PROCEDURE — 70450 CT HEAD/BRAIN W/O DYE: CPT | Performed by: EMERGENCY MEDICINE

## 2018-06-26 RX ORDER — SODIUM CHLORIDE 9 MG/ML
INJECTION, SOLUTION INTRAVENOUS CONTINUOUS
Status: DISCONTINUED | OUTPATIENT
Start: 2018-06-26 | End: 2018-06-26

## 2018-06-26 RX ORDER — HYDROCODONE BITARTRATE AND ACETAMINOPHEN 10; 325 MG/1; MG/1
1 TABLET ORAL EVERY 6 HOURS PRN
Status: DISCONTINUED | OUTPATIENT
Start: 2018-06-26 | End: 2018-06-26

## 2018-06-26 RX ORDER — ONDANSETRON 2 MG/ML
4 INJECTION INTRAMUSCULAR; INTRAVENOUS EVERY 6 HOURS PRN
Status: DISCONTINUED | OUTPATIENT
Start: 2018-06-26 | End: 2018-06-26

## 2018-06-26 RX ORDER — KETOROLAC TROMETHAMINE 30 MG/ML
15 INJECTION, SOLUTION INTRAMUSCULAR; INTRAVENOUS ONCE
Status: COMPLETED | OUTPATIENT
Start: 2018-06-26 | End: 2018-06-26

## 2018-06-26 RX ORDER — POTASSIUM CHLORIDE 20 MEQ/1
40 TABLET, EXTENDED RELEASE ORAL ONCE
Status: COMPLETED | OUTPATIENT
Start: 2018-06-26 | End: 2018-06-26

## 2018-06-26 RX ORDER — POLYETHYLENE GLYCOL 3350 17 G/17G
17 POWDER, FOR SOLUTION ORAL DAILY
Status: DISCONTINUED | OUTPATIENT
Start: 2018-06-26 | End: 2018-06-26

## 2018-06-26 RX ORDER — METOCLOPRAMIDE HYDROCHLORIDE 5 MG/ML
10 INJECTION INTRAMUSCULAR; INTRAVENOUS EVERY 8 HOURS PRN
Status: DISCONTINUED | OUTPATIENT
Start: 2018-06-26 | End: 2018-06-26

## 2018-06-26 RX ORDER — ENOXAPARIN SODIUM 100 MG/ML
40 INJECTION SUBCUTANEOUS DAILY
Status: DISCONTINUED | OUTPATIENT
Start: 2018-06-26 | End: 2018-06-26

## 2018-06-26 RX ORDER — ONDANSETRON 2 MG/ML
4 INJECTION INTRAMUSCULAR; INTRAVENOUS ONCE
Status: DISCONTINUED | OUTPATIENT
Start: 2018-06-26 | End: 2018-06-26

## 2018-06-26 NOTE — PLAN OF CARE
Pt ambulated in hallway on room air and sats were 98-99%, she is ordering her food and waiting for Dr Radha Rodriguez .

## 2018-06-26 NOTE — PROGRESS NOTES
[facility]  Progress Note     Cheryle Carolin Patient Status:  Inpatient    1960 MRN [id number]   [facility] 5NW-A Attending Jada Marie MD   Hosp Day # 0 PCP Harleen Gonsalez MD     Chief Complaint: confusion    S: Patient d nausea resolved  3. Advance diet as tolerated  4.  D/c to home if diet tolerated and not confused    Plan of care: d/c    Quality:  · DVT Prophylaxis: scd  · CODE status: full  · Gleason: no  · Central line: no    Estimated date of discharge: today  Discharge

## 2018-06-26 NOTE — PROGRESS NOTES
Pharmacy Progress Note: Discharge Medication Counseling    Kem Hung has been counseled on 6 medications.    Of these medications, the following are new to the patient: none      The indication and common side effects of the discharge medications were

## 2018-06-26 NOTE — PAYOR COMM NOTE
--------------  ADMISSION REVIEW     Payor: [facility] PP  Subscriber #:  [id number]  Authorization Number: N/A    Admit date: 6/26/18  Admit time: 1162       Admitting Physician: Kaleb Solano MD  Attending Physician:  Leanne Delgado MD  Primary Care Physicia Comment: Procedure: COLONOSCOPY, POSSIBLE BIOPSY,                POSSIBLE POLYPECTOMY [id number];  Surgeon: Bud Castillo MD;  Location: [place] [facility]: HYSTERECTOMY      Comment: with SSO  06/04/2018: OTHER (14) - Abnormal; Notable for the following:        Result Value    Glucose 192 (*)     All other components within normal limits   URINALYSIS WITH CULTURE REFLEX - Abnormal; Notable for the following:     Glucose Urine 50  (*)     Ketones Urine Trace (*) workup and treatment.     Disposition and Plan     Clinical Impression:  Elevated lactic acid level  (primary encounter diagnosis)  Vomiting without nausea, intractability of vomiting not specified, unspecified vomiting type  Altered mental status, unspecif daily. Disp: 180 capsule Rfl: 0   HYDROcodone-acetaminophen  MG Oral Tab Take 1 tablet by mouth every 4 to 6 hours as needed.  Disp: 60 tablet Rfl: 0   Cyclobenzaprine HCl 10 MG Oral Tab Take 1 tablet (10 mg total) by mouth 3 (three) times daily as ne Data:      Labs:  Recent Labs   Lab  06/25/18   2342  06/25/18   2343   WBC   --   6.9   HGB   --   13.3   MCV   --   90.3   PLT   --   375.0   INR  1.06   --        Recent Labs   Lab  06/25/18   2343   GLU  192*   BUN  18   CREATSERUM  0.91   GFRAA  80 Date Action Dose Route User    6/25/2018 2345 New Bag 1000 mL Intravenous Shonna Sanchez, RN      sodium chloride 0.9% IV bolus 1,000 mL     Date Action Dose Route User    6/26/2018 0050 New Bag 1000 mL Intravenous Shonna Sanchez RN          FOR REVIE

## 2018-06-26 NOTE — PROGRESS NOTES
Pt is being discharged home instructions. The pharmacist will be discussing dc meds with her.  IV dcd

## 2018-06-26 NOTE — PROGRESS NOTES
NURSING ADMISSION NOTE      Patient admitted via [address] to room 504  Safety precautions initiated. Bed in low position. Call light in reach. Pt arrived to [facility] from emergency department. Pt A/O x 4, no distress noted.  Admission database com

## 2018-06-26 NOTE — PROGRESS NOTES
Pt is alert and oriented, she has  Her cervical collar in place, no numbness/tingling noted. Hand grasps are equal and strong. IVF infusing. k was replaced, denies any pain, belly soft, non-distended, lungs clear, RA.  Will monitor

## 2018-06-26 NOTE — H&P
[facility]  History and Physical     Yelena Gan Patient Status:  Inpatient    1960 MRN [id number]   [facility] 5NW-A Attending Sulema Yun MD   Hosp Day # 0 PCP Quique Novak MD     Chief Complaint: N/V    History that she does not use drugs.     Family History:   Family History   Problem Relation Age of Onset   • Heart Disorder Father 37     CABG x 11, had stents later in life   • Diabetes Father    • Breast Cancer Mother 50   • osteoporosis [OTHER] Mother    • Aly Rfl:    TraZODone HCl 50 MG Oral Tab Take 1 tablet (50 mg total) by mouth nightly. Disp: 90 tablet Rfl: 3   Melatonin 5 MG Oral Tab Take 1 tablet by mouth nightly.  5 - 10 MG  Disp:  Rfl:    Estradiol (ESTRACE) 0.1 MG/GM Vaginal Cream Apply pea sized amount 06/25/18   2343   TROP  <0.046       Imaging: Imaging data reviewed in Epic. ASSESSMENT / PLAN:     1. Intractable N/V  1. Resolved   2. IVF  3. CLD and advance as tolerated   4. F/u with final CT A/P result, prelim no acute pathology reported   2.  Mague Palumbo

## 2018-06-26 NOTE — ED PROVIDER NOTES
Patient Seen in: [facility] Emergency Department    History   Patient presents with:  Dyspnea SVITLANA SOB (respiratory)  Neck Pain (musculoskeletal, neurologic)    Stated Complaint: SVITLANA, neck pain    HPI    Patient is a 20-year-old woman brought in for Smokeless tobacco: Never Used                      Alcohol use:  Yes              Comment: rarely, holidays, maybe one per month       Review of Systems    Positive for stated complaint: SVITLANA, neck pain  Other systems within normal limits   DRUG SCREEN 7 W/OUT CONFIRMATION, URINE - Abnormal; Notable for the following:     Cannabinoid Urine Presumed Positive (*)     Opiate Urine Presumed Positive (*)     All other components within normal limits    Narrative:     Results straightening. Mild DJD/DDD. Mild lung apical pleural thickening/scarring. [facility]     Admission disposition: 6/26/2018  2:10 AM       IV established. Patient given IV fluids. Workup demonstrates elevated lactic acid level.   However serum bicarb is n

## 2018-06-26 NOTE — PROGRESS NOTES
Pt having issues last night   Brought to er  Surgically pt doing well  No acute dante intervention  F/u as scheduled

## 2018-06-29 NOTE — DISCHARGE SUMMARY
[facility]  DISCHARGE SUMMARY     Sallie Wharton Patient Status:  Observation    1960 MRN [id number]   [facility] 5NW-A Attending No att. providers found   Hosp Day # 0 PCP Julianne Irizarry MD     Date of Admission: 2018 to 6 hours as needed. Quantity:  60 tablet  Refills:  0     Melatonin 5 MG Tabs      Take 1 tablet by mouth nightly. 5 - 10 MG   Refills:  0     VITAMIN D3 OR      Take 2,000 Units by mouth daily.    Refills:  0        STOP taking these medications    Cyc

## 2018-07-19 ENCOUNTER — OFFICE VISIT (OUTPATIENT)
Dept: SURGERY | Facility: CLINIC | Age: 58
End: 2018-07-19

## 2018-07-19 VITALS — HEART RATE: 74 BPM | DIASTOLIC BLOOD PRESSURE: 80 MMHG | SYSTOLIC BLOOD PRESSURE: 120 MMHG

## 2018-07-19 DIAGNOSIS — Z98.1 S/P CERVICAL SPINAL FUSION: Primary | ICD-10-CM

## 2018-07-19 DIAGNOSIS — M47.22 CERVICAL SPONDYLOSIS WITH RADICULOPATHY: ICD-10-CM

## 2018-07-19 PROCEDURE — 99024 POSTOP FOLLOW-UP VISIT: CPT | Performed by: PHYSICIAN ASSISTANT

## 2018-07-19 NOTE — PROGRESS NOTES
Pt is here for follow up for post op SX on the neck. SX date 6/4/18. Pt states she is doing better since the last time we seen her.

## 2018-07-19 NOTE — PROGRESS NOTES
Neurosurgery Clinic Visit  2018    Cindy Smiley PCP:  Valentin Taylor MD    1960 MRN [id number]     HISTORY OF PRESENT ILLNESS:  Cindy Smiley is a(n) 59year old female who is here 6 weeks s/p 2 level ACDF. She is feeling well.   She fe

## 2018-07-19 NOTE — PATIENT INSTRUCTIONS
Refill policies:    • Allow 2-3 business days for refills; controlled substances may take longer.   • Contact your pharmacy at least 5 days prior to running out of medication and have them send an electronic request or submit request through the “request re entire amount billed. Precertification and Prior Authorizations: If your physician has recommended that you have a procedure or additional testing performed.   [facility]) will contact your insurance carrier to obtain pre-certi

## 2018-07-20 NOTE — DISCHARGE SUMMARY
[facility]  Discharge Summary    Adonay Ellis Patient Status:  Inpatient    1960 MRN [id number]   [facility] 3SW-A Attending No att. providers found   Hosp Day # 2 PCP Kimberly Kulkarni MD     Date of Admission: 2018    Jac mg total) by mouth 3 (three) times daily as needed for Muscle spasms. , Print Script, Disp-60 tablet, R-0    DiphenhydrAMINE HCl 25 MG Oral Cap  Take 1 capsule (25 mg total) by mouth every 4 (four) hours as needed for Itching., Print Script, Disp-60 capsule

## 2018-09-20 ENCOUNTER — OFFICE VISIT (OUTPATIENT)
Dept: SURGERY | Facility: CLINIC | Age: 58
End: 2018-09-20
Payer: COMMERCIAL

## 2018-09-20 ENCOUNTER — HOSPITAL ENCOUNTER (OUTPATIENT)
Dept: GENERAL RADIOLOGY | Facility: HOSPITAL | Age: 58
Discharge: HOME | End: 2018-09-20
Attending: PHYSICIAN ASSISTANT
Payer: COMMERCIAL

## 2018-09-20 VITALS — HEART RATE: 74 BPM | DIASTOLIC BLOOD PRESSURE: 76 MMHG | SYSTOLIC BLOOD PRESSURE: 108 MMHG

## 2018-09-20 DIAGNOSIS — Z98.1 S/P CERVICAL SPINAL FUSION: ICD-10-CM

## 2018-09-20 DIAGNOSIS — M47.22 CERVICAL SPONDYLOSIS WITH RADICULOPATHY: ICD-10-CM

## 2018-09-20 DIAGNOSIS — M47.22 CERVICAL SPONDYLOSIS WITH RADICULOPATHY: Primary | ICD-10-CM

## 2018-09-20 PROCEDURE — 99213 OFFICE O/P EST LOW 20 MIN: CPT | Performed by: NEUROLOGICAL SURGERY

## 2018-09-20 PROCEDURE — 72040 X-RAY EXAM NECK SPINE 2-3 VW: CPT | Performed by: PHYSICIAN ASSISTANT

## 2018-09-20 RX ORDER — TRAZODONE HYDROCHLORIDE 100 MG/1
100 TABLET ORAL NIGHTLY
COMMUNITY
End: 2019-03-07

## 2018-09-20 RX ORDER — METHYLPREDNISOLONE 4 MG/1
TABLET ORAL
Qty: 1 PACKAGE | Refills: 0 | Status: SHIPPED | OUTPATIENT
Start: 2018-09-20 | End: 2018-12-13 | Stop reason: ALTCHOICE

## 2018-09-20 NOTE — PROGRESS NOTES
Neurosurgery Clinic Visit  2018    Adonay Ellis PCP:  Kimberly Kulkarni MD    1960 MRN [id number]     HISTORY OF PRESENT ILLNESS:  Adonay Ellis is a(n) 59year old female who is here 3 months s/p 2 level ACDF. Tresia Blocker is feeling well and not

## 2018-09-20 NOTE — PATIENT INSTRUCTIONS
Refill policies:    • Allow 2-3 business days for refills; controlled substances may take longer.   • Contact your pharmacy at least 5 days prior to running out of medication and have them send an electronic request or submit request through the “request re entire amount billed. Precertification and Prior Authorizations: If your physician has recommended that you have a procedure or additional testing performed.   [facility]) will contact your insurance carrier to obtain pre-certi

## 2018-12-13 ENCOUNTER — OFFICE VISIT (OUTPATIENT)
Dept: SURGERY | Facility: CLINIC | Age: 58
End: 2018-12-13
Payer: COMMERCIAL

## 2018-12-13 VITALS — DIASTOLIC BLOOD PRESSURE: 72 MMHG | HEART RATE: 76 BPM | SYSTOLIC BLOOD PRESSURE: 108 MMHG

## 2018-12-13 DIAGNOSIS — M50.122 CERVICAL DISC DISORDER AT C5-C6 LEVEL WITH RADICULOPATHY: Primary | ICD-10-CM

## 2018-12-13 PROCEDURE — 99213 OFFICE O/P EST LOW 20 MIN: CPT | Performed by: NEUROLOGICAL SURGERY

## 2018-12-13 NOTE — PROGRESS NOTES
Pt is here for post op  Appointment for the cervical.  Pt states that when swallowing she can fel it going over something.    Pain scale: 4/10   SX date 6/4/18

## 2018-12-13 NOTE — PROGRESS NOTES
Neurosurgery Clinic Visit  2018    Marissaana Charles PCP:  Clau Pittman MD    1960 MRN [id number]     HISTORY OF PRESENT ILLNESS:  Harsha Charles is a(n) 59year old female here for follow-up status post ACDF  She is doing well  She is a l

## 2019-02-11 RX ORDER — TRAZODONE HYDROCHLORIDE 50 MG/1
50 TABLET ORAL NIGHTLY
Qty: 90 TABLET | Refills: 0 | OUTPATIENT
Start: 2019-02-11

## 2019-03-01 VITALS
SYSTOLIC BLOOD PRESSURE: 102 MMHG | WEIGHT: 129 LBS | DIASTOLIC BLOOD PRESSURE: 70 MMHG | HEIGHT: 64 IN | BODY MASS INDEX: 22.02 KG/M2 | HEART RATE: 68 BPM

## 2019-03-07 ENCOUNTER — OFFICE VISIT (OUTPATIENT)
Dept: FAMILY MEDICINE CLINIC | Facility: CLINIC | Age: 59
End: 2019-03-07
Payer: COMMERCIAL

## 2019-03-07 VITALS
RESPIRATION RATE: 16 BRPM | DIASTOLIC BLOOD PRESSURE: 80 MMHG | BODY MASS INDEX: 21.52 KG/M2 | HEIGHT: 63.5 IN | TEMPERATURE: 98 F | WEIGHT: 123 LBS | HEART RATE: 80 BPM | SYSTOLIC BLOOD PRESSURE: 136 MMHG

## 2019-03-07 DIAGNOSIS — E55.9 VITAMIN D DEFICIENCY: ICD-10-CM

## 2019-03-07 DIAGNOSIS — Z00.00 ROUTINE GENERAL MEDICAL EXAMINATION AT A HEALTH CARE FACILITY: Primary | ICD-10-CM

## 2019-03-07 DIAGNOSIS — M81.0 OSTEOPOROSIS, UNSPECIFIED OSTEOPOROSIS TYPE, UNSPECIFIED PATHOLOGICAL FRACTURE PRESENCE: ICD-10-CM

## 2019-03-07 DIAGNOSIS — Z12.39 BREAST CANCER SCREENING: ICD-10-CM

## 2019-03-07 DIAGNOSIS — Z13.89 SCREENING FOR GENITOURINARY CONDITION: ICD-10-CM

## 2019-03-07 DIAGNOSIS — H92.22 BLEEDING FROM LEFT EAR: ICD-10-CM

## 2019-03-07 PROBLEM — R31.29 MICROSCOPIC HEMATURIA: Status: ACTIVE | Noted: 2019-03-07

## 2019-03-07 PROCEDURE — 99396 PREV VISIT EST AGE 40-64: CPT | Performed by: FAMILY MEDICINE

## 2019-03-07 RX ORDER — ESTRADIOL 0.1 MG/G
CREAM VAGINAL
Qty: 42.5 G | Refills: 11 | Status: SHIPPED | OUTPATIENT
Start: 2019-03-07 | End: 2020-08-31

## 2019-03-07 RX ORDER — TRAZODONE HYDROCHLORIDE 100 MG/1
100 TABLET ORAL NIGHTLY
Qty: 90 TABLET | Refills: 3 | Status: SHIPPED | OUTPATIENT
Start: 2019-03-07 | End: 2019-12-26 | Stop reason: ALTCHOICE

## 2019-03-07 NOTE — PROGRESS NOTES
CHIEF COMPLAINT       Annual Physical Exam  HPI:   Michelle Bauman is a 59year old female who presents for a complete physical exam.   Reclast due now. Normal pap hx. Work up with Dr. Syeda Lorenz march 2013 for micro hematuria.     Upper respiratory infect cats  Radiology Contrast *    ANAPHYLAXIS  Iodine (Topical)        HIVES, TONGUE SWELLING    Comment:Fish iodine   History reviewed. No pertinent past medical history.    Past Surgical History:   Procedure Laterality Date   • ANTERIOR CERVICAL FUSION BG & I REVIEW OF SYSTEMS:   GENERAL: feels well otherwise  SKIN: no complaint of any unusual skin lesions  EYES: no complaint of blurred vision or double vision  HEENT: AS ABOVE  LUNGS: no complaint of shortness of breath with exertion  CARDIOVASCULAR: no com today to check ear.

## 2019-03-07 NOTE — PATIENT INSTRUCTIONS
Check on insurance coverage for Shingrix. If covered, then ask your insurance if you should do it at the [address] [place] [address] office or pharmacy. We don't currently have it available in our office. Also consider a Tdap vaccine.      Call when you are going for la

## 2019-06-28 ENCOUNTER — TELEPHONE (OUTPATIENT)
Dept: FAMILY MEDICINE CLINIC | Facility: CLINIC | Age: 59
End: 2019-06-28

## 2019-06-28 DIAGNOSIS — L25.5 DERMATITIS DUE TO PLANTS, INCLUDING POISON IVY, SUMAC, AND OAK: Primary | ICD-10-CM

## 2019-06-28 RX ORDER — METHYLPREDNISOLONE 4 MG/1
TABLET ORAL
Qty: 1 KIT | Refills: 0 | Status: SHIPPED | OUTPATIENT
Start: 2019-06-28 | End: 2019-08-13 | Stop reason: ALTCHOICE

## 2019-06-28 NOTE — TELEPHONE ENCOUNTER
I'm sorry that she is upset - I thought that she would be happy to be seen today. I feel it's best to see her for the rash since I've seen people who thought they had poison ivy and it was actually something else.   Steroids have risks and the dose and dur

## 2019-06-28 NOTE — TELEPHONE ENCOUNTER
Patient has poison juan alberto, she states Dr. Nikia Rios has called in a steroid in the past for her. She is leaving town today to take her Mom back to [place]. Please Advise. Thank you.

## 2019-06-28 NOTE — TELEPHONE ENCOUNTER
Offered appt for pt. She declines. States she cannot do this. Driving mom to [employer] today for mom's appt. She is not sure what time she is leaving but cannot come here. I did discuss WIC. She refuses this.   States Dr Urmila Olvera been my dr for 30 yrs, he almonte

## 2019-08-12 ENCOUNTER — HOSPITAL ENCOUNTER (OUTPATIENT)
Dept: GENERAL RADIOLOGY | Facility: HOSPITAL | Age: 59
Discharge: HOME | End: 2019-08-12
Attending: NEUROLOGICAL SURGERY
Payer: COMMERCIAL

## 2019-08-12 DIAGNOSIS — M50.122 CERVICAL DISC DISORDER AT C5-C6 LEVEL WITH RADICULOPATHY: ICD-10-CM

## 2019-08-12 PROCEDURE — 72040 X-RAY EXAM NECK SPINE 2-3 VW: CPT | Performed by: NEUROLOGICAL SURGERY

## 2019-08-13 ENCOUNTER — OFFICE VISIT (OUTPATIENT)
Dept: SURGERY | Facility: CLINIC | Age: 59
End: 2019-08-13
Payer: COMMERCIAL

## 2019-08-13 ENCOUNTER — TELEPHONE (OUTPATIENT)
Dept: SURGERY | Facility: CLINIC | Age: 59
End: 2019-08-13

## 2019-08-13 VITALS — DIASTOLIC BLOOD PRESSURE: 82 MMHG | SYSTOLIC BLOOD PRESSURE: 118 MMHG | HEART RATE: 76 BPM

## 2019-08-13 DIAGNOSIS — M47.22 CERVICAL SPONDYLOSIS WITH RADICULOPATHY: Primary | ICD-10-CM

## 2019-08-13 DIAGNOSIS — G56.01 RIGHT CARPAL TUNNEL SYNDROME: ICD-10-CM

## 2019-08-13 DIAGNOSIS — G56.21 ULNAR NEUROPATHY AT ELBOW, RIGHT: ICD-10-CM

## 2019-08-13 DIAGNOSIS — Z98.1 S/P CERVICAL SPINAL FUSION: ICD-10-CM

## 2019-08-13 PROCEDURE — 99213 OFFICE O/P EST LOW 20 MIN: CPT | Performed by: PHYSICIAN ASSISTANT

## 2019-08-13 NOTE — PROGRESS NOTES
Neurosurgery Clinic Visit  2019    Shan Rees PCP:  Jarett Gil MD    1960 MRN [id number]     HISTORY OF PRESENT ILLNESS:  Shan Rees is a(n) 59year old female who is here 1 year s/p 2 level ACDF.  She notes improvement in her decompression. Risks and benefits were discussed at length. To included expected outcome from surgery, unexpected outcomes from surgery, and associated risks with any surgical procedure.  To include bleeding, infection, neurological injury, and failure to

## 2019-08-13 NOTE — TELEPHONE ENCOUNTER
You are scheduled for Right Carpal Tunnel Release and Ulnar Nerve Decompression  on 09/09/19 with     Pre-op instructions discussed with patient and surgical packet provided:    · You will need preoperative labs, the orders have been placed.   ·

## 2019-08-13 NOTE — PROGRESS NOTES
PT here for 6 month f/u post xray. Continues to have shoulder pain, tendonitis in right arm also flaring. Had xray done yesterday.   Here to review

## 2019-08-13 NOTE — H&P
Neurosurgery Clinic Visit  2019    Roddy Ramos PCP:  Gregorio Quinn MD    1960 MRN [id number]     HISTORY OF PRESENT ILLNESS:  Roddy Ramos is a(n) 59year old female who is here 1 year s/p 2 level ACDF.  She notes improvement in her decompression. Risks and benefits were discussed at length. To included expected outcome from surgery, unexpected outcomes from surgery, and associated risks with any surgical procedure.  To include bleeding, infection, neurological injury, and failure to

## 2019-08-13 NOTE — H&P (VIEW-ONLY)
Neurosurgery Clinic Visit  2019    Ani Bingham PCP:  Stephanie Vallejo MD    1960 MRN [id number]     HISTORY OF PRESENT ILLNESS:  Ani Bingham is a(n) 59year old female who is here 1 year s/p 2 level ACDF.  She notes improvement in her decompression. Risks and benefits were discussed at length. To included expected outcome from surgery, unexpected outcomes from surgery, and associated risks with any surgical procedure.  To include bleeding, infection, neurological injury, and failure to

## 2019-08-14 ENCOUNTER — TELEPHONE (OUTPATIENT)
Dept: SURGERY | Facility: CLINIC | Age: 59
End: 2019-08-14

## 2019-08-14 RX ORDER — CEFAZOLIN SODIUM/WATER 2 G/20 ML
2 SYRINGE (ML) INTRAVENOUS ONCE
Status: CANCELLED | OUTPATIENT
Start: 2019-08-14 | End: 2019-08-14

## 2019-08-14 RX ORDER — GABAPENTIN 100 MG/1
100 CAPSULE ORAL 2 TIMES DAILY
COMMUNITY
End: 2019-10-24

## 2019-08-19 ENCOUNTER — LABORATORY ENCOUNTER (OUTPATIENT)
Dept: LAB | Facility: HOSPITAL | Age: 59
End: 2019-08-19
Attending: NEUROLOGICAL SURGERY
Payer: COMMERCIAL

## 2019-08-19 ENCOUNTER — HOSPITAL ENCOUNTER (OUTPATIENT)
Dept: CT IMAGING | Age: 59
Discharge: HOME | End: 2019-08-19
Attending: PHYSICIAN ASSISTANT
Payer: COMMERCIAL

## 2019-08-19 DIAGNOSIS — G56.01 RIGHT CARPAL TUNNEL SYNDROME: ICD-10-CM

## 2019-08-19 DIAGNOSIS — G56.21 ULNAR NEUROPATHY AT ELBOW, RIGHT: ICD-10-CM

## 2019-08-19 DIAGNOSIS — M47.22 CERVICAL SPONDYLOSIS WITH RADICULOPATHY: ICD-10-CM

## 2019-08-19 DIAGNOSIS — Z98.1 S/P CERVICAL SPINAL FUSION: ICD-10-CM

## 2019-08-19 LAB
ALBUMIN SERPL-MCNC: 3.6 G/DL (ref 3.4–5)
ALBUMIN/GLOB SERPL: 1.3 {RATIO} (ref 1–2)
ALP LIVER SERPL-CCNC: 49 U/L (ref 46–118)
ALT SERPL-CCNC: 26 U/L (ref 13–56)
ANION GAP SERPL CALC-SCNC: 6 MMOL/L (ref 0–18)
APTT PPP: 28 SECONDS (ref 25.4–36.1)
AST SERPL-CCNC: 20 U/L (ref 15–37)
BASOPHILS # BLD AUTO: 0.03 X10(3) UL (ref 0–0.2)
BASOPHILS NFR BLD AUTO: 0.8 %
BILIRUB SERPL-MCNC: 0.3 MG/DL (ref 0.1–2)
BUN BLD-MCNC: 18 MG/DL (ref 7–18)
BUN/CREAT SERPL: 27.3 (ref 10–20)
CALCIUM BLD-MCNC: 8.5 MG/DL (ref 8.5–10.1)
CHLORIDE SERPL-SCNC: 109 MMOL/L (ref 98–112)
CO2 SERPL-SCNC: 27 MMOL/L (ref 21–32)
CREAT BLD-MCNC: 0.66 MG/DL (ref 0.55–1.02)
DEPRECATED RDW RBC AUTO: 46.1 FL (ref 35.1–46.3)
EOSINOPHIL # BLD AUTO: 0.11 X10(3) UL (ref 0–0.7)
EOSINOPHIL NFR BLD AUTO: 2.9 %
ERYTHROCYTE [DISTWIDTH] IN BLOOD BY AUTOMATED COUNT: 12.9 % (ref 11–15)
GLOBULIN PLAS-MCNC: 2.7 G/DL (ref 2.8–4.4)
GLUCOSE BLD-MCNC: 91 MG/DL (ref 70–99)
HCT VFR BLD AUTO: 39 % (ref 35–48)
HGB BLD-MCNC: 12.5 G/DL (ref 12–16)
IMM GRANULOCYTES # BLD AUTO: 0.01 X10(3) UL (ref 0–1)
IMM GRANULOCYTES NFR BLD: 0.3 %
INR BLD: 1.05 (ref 0.9–1.1)
LYMPHOCYTES # BLD AUTO: 1.62 X10(3) UL (ref 1–4)
LYMPHOCYTES NFR BLD AUTO: 42.1 %
M PROTEIN MFR SERPL ELPH: 6.3 G/DL (ref 6.4–8.2)
MCH RBC QN AUTO: 30.9 PG (ref 26–34)
MCHC RBC AUTO-ENTMCNC: 32.1 G/DL (ref 31–37)
MCV RBC AUTO: 96.5 FL (ref 80–100)
MONOCYTES # BLD AUTO: 0.26 X10(3) UL (ref 0.1–1)
MONOCYTES NFR BLD AUTO: 6.8 %
NEUTROPHILS # BLD AUTO: 1.82 X10 (3) UL (ref 1.5–7.7)
NEUTROPHILS # BLD AUTO: 1.82 X10(3) UL (ref 1.5–7.7)
NEUTROPHILS NFR BLD AUTO: 47.1 %
OSMOLALITY SERPL CALC.SUM OF ELEC: 295 MOSM/KG (ref 275–295)
PLATELET # BLD AUTO: 179 10(3)UL (ref 150–450)
POTASSIUM SERPL-SCNC: 4.5 MMOL/L (ref 3.5–5.1)
PSA SERPL DL<=0.01 NG/ML-MCNC: 14.2 SECONDS (ref 12.5–14.7)
RBC # BLD AUTO: 4.04 X10(6)UL (ref 3.8–5.3)
SODIUM SERPL-SCNC: 142 MMOL/L (ref 136–145)
WBC # BLD AUTO: 3.9 X10(3) UL (ref 4–11)

## 2019-08-19 PROCEDURE — 85610 PROTHROMBIN TIME: CPT

## 2019-08-19 PROCEDURE — 80053 COMPREHEN METABOLIC PANEL: CPT

## 2019-08-19 PROCEDURE — 87081 CULTURE SCREEN ONLY: CPT

## 2019-08-19 PROCEDURE — 85025 COMPLETE CBC W/AUTO DIFF WBC: CPT

## 2019-08-19 PROCEDURE — 85730 THROMBOPLASTIN TIME PARTIAL: CPT

## 2019-08-19 PROCEDURE — 36415 COLL VENOUS BLD VENIPUNCTURE: CPT

## 2019-08-19 PROCEDURE — 72125 CT NECK SPINE W/O DYE: CPT | Performed by: PHYSICIAN ASSISTANT

## 2019-08-20 NOTE — TELEPHONE ENCOUNTER
Spoke to Sofia at [address], Carpal Tunnel Release ([address]) and Ulnar Nerve Decompression (12354) is valid and billable, no copayment applies to the service, covered at 100% of the allowed amount. No prior authorization or predetermination required.  Call re

## 2019-08-22 ENCOUNTER — TELEPHONE (OUTPATIENT)
Dept: SURGERY | Facility: CLINIC | Age: 59
End: 2019-08-22

## 2019-08-22 DIAGNOSIS — Z98.1 S/P CERVICAL SPINAL FUSION: ICD-10-CM

## 2019-08-22 DIAGNOSIS — R13.12 OROPHARYNGEAL DYSPHAGIA: Primary | ICD-10-CM

## 2019-08-22 DIAGNOSIS — J38.00 VOCAL CORD PARALYSIS: ICD-10-CM

## 2019-08-22 NOTE — TELEPHONE ENCOUNTER
Calling for CT results      CT 08/19/19  CONCLUSION:       1. No acute fracture or subluxation in the cervical spine. 2. Anterior cervical spinal fusion changes are again noted spanning the C4-C6 levels.   Anterior compression plate and screws are [place]

## 2019-08-22 NOTE — TELEPHONE ENCOUNTER
Attempted to call patient, voicemail is full. CT shows fusion, but it is not robust.  Recommend bone growth stimulator. Will order. CT also shows question of vocal cord paralysis. This may be contributing to her swallowing issues.   Recommend ENT evalua

## 2019-08-23 NOTE — TELEPHONE ENCOUNTER
Spoke with patient and informed her of message below. Patient understood and was thankful for the information. Order was also faxed to [facility] for BGS.

## 2019-08-30 PROBLEM — R13.10 DYSPHAGIA, UNSPECIFIED TYPE: Status: ACTIVE | Noted: 2019-08-30

## 2019-09-05 ENCOUNTER — TELEPHONE (OUTPATIENT)
Dept: SURGERY | Facility: CLINIC | Age: 59
End: 2019-09-05

## 2019-09-05 NOTE — TELEPHONE ENCOUNTER
Called patient to give number to [facility]    Referred to [facility]  Dispense external bone growth stimulator    Customer Service  # [phone]  Fax # [phone]

## 2019-09-08 ENCOUNTER — ANESTHESIA EVENT (OUTPATIENT)
Dept: SURGERY | Facility: HOSPITAL | Age: 59
End: 2019-09-08

## 2019-09-09 ENCOUNTER — ANESTHESIA (OUTPATIENT)
Dept: SURGERY | Facility: HOSPITAL | Age: 59
End: 2019-09-09

## 2019-09-09 ENCOUNTER — HOSPITAL ENCOUNTER (OUTPATIENT)
Facility: HOSPITAL | Age: 59
Setting detail: HOSPITAL OUTPATIENT SURGERY
Discharge: HOME | End: 2019-09-09
Attending: NEUROLOGICAL SURGERY | Admitting: NEUROLOGICAL SURGERY
Payer: COMMERCIAL

## 2019-09-09 VITALS
DIASTOLIC BLOOD PRESSURE: 58 MMHG | TEMPERATURE: 97 F | SYSTOLIC BLOOD PRESSURE: 120 MMHG | HEART RATE: 71 BPM | WEIGHT: 126.13 LBS | RESPIRATION RATE: 18 BRPM | HEIGHT: 64 IN | BODY MASS INDEX: 21.53 KG/M2 | OXYGEN SATURATION: 98 %

## 2019-09-09 DIAGNOSIS — Z98.1 S/P CERVICAL SPINAL FUSION: ICD-10-CM

## 2019-09-09 DIAGNOSIS — G56.21 ULNAR NEUROPATHY AT ELBOW, RIGHT: ICD-10-CM

## 2019-09-09 DIAGNOSIS — M47.22 CERVICAL SPONDYLOSIS WITH RADICULOPATHY: Primary | ICD-10-CM

## 2019-09-09 DIAGNOSIS — G56.01 RIGHT CARPAL TUNNEL SYNDROME: ICD-10-CM

## 2019-09-09 PROCEDURE — 01N40ZZ RELEASE ULNAR NERVE, OPEN APPROACH: ICD-10-PCS | Performed by: NEUROLOGICAL SURGERY

## 2019-09-09 PROCEDURE — 8E0XXBZ COMPUTER ASSISTED PROCEDURE OF UPPER EXTREMITY: ICD-10-PCS | Performed by: NEUROLOGICAL SURGERY

## 2019-09-09 PROCEDURE — 01N50ZZ RELEASE MEDIAN NERVE, OPEN APPROACH: ICD-10-PCS | Performed by: NEUROLOGICAL SURGERY

## 2019-09-09 RX ORDER — BUPIVACAINE HYDROCHLORIDE 2.5 MG/ML
INJECTION, SOLUTION EPIDURAL; INFILTRATION; INTRACAUDAL AS NEEDED
Status: DISCONTINUED | OUTPATIENT
Start: 2019-09-09 | End: 2019-09-09 | Stop reason: HOSPADM

## 2019-09-09 RX ORDER — CEFAZOLIN SODIUM/WATER 2 G/20 ML
SYRINGE (ML) INTRAVENOUS
Status: DISCONTINUED
Start: 2019-09-09 | End: 2019-09-09

## 2019-09-09 RX ORDER — MIDAZOLAM HYDROCHLORIDE 1 MG/ML
1 INJECTION INTRAMUSCULAR; INTRAVENOUS EVERY 5 MIN PRN
Status: DISCONTINUED | OUTPATIENT
Start: 2019-09-09 | End: 2019-09-09

## 2019-09-09 RX ORDER — METOCLOPRAMIDE HYDROCHLORIDE 5 MG/ML
10 INJECTION INTRAMUSCULAR; INTRAVENOUS AS NEEDED
Status: DISCONTINUED | OUTPATIENT
Start: 2019-09-09 | End: 2019-09-09

## 2019-09-09 RX ORDER — HYDROCODONE BITARTRATE AND ACETAMINOPHEN 5; 325 MG/1; MG/1
1 TABLET ORAL EVERY 4 HOURS PRN
Qty: 30 TABLET | Refills: 0 | Status: SHIPPED | OUTPATIENT
Start: 2019-09-09 | End: 2019-10-24 | Stop reason: ALTCHOICE

## 2019-09-09 RX ORDER — ACETAMINOPHEN 500 MG
1000 TABLET ORAL ONCE
Status: DISCONTINUED | OUTPATIENT
Start: 2019-09-09 | End: 2019-09-09 | Stop reason: HOSPADM

## 2019-09-09 RX ORDER — SODIUM CHLORIDE, SODIUM LACTATE, POTASSIUM CHLORIDE, CALCIUM CHLORIDE 600; 310; 30; 20 MG/100ML; MG/100ML; MG/100ML; MG/100ML
INJECTION, SOLUTION INTRAVENOUS CONTINUOUS
Status: DISCONTINUED | OUTPATIENT
Start: 2019-09-09 | End: 2019-09-09

## 2019-09-09 RX ORDER — MEPERIDINE HYDROCHLORIDE 25 MG/ML
12.5 INJECTION INTRAMUSCULAR; INTRAVENOUS; SUBCUTANEOUS AS NEEDED
Status: DISCONTINUED | OUTPATIENT
Start: 2019-09-09 | End: 2019-09-09

## 2019-09-09 RX ORDER — NALOXONE HYDROCHLORIDE 0.4 MG/ML
80 INJECTION, SOLUTION INTRAMUSCULAR; INTRAVENOUS; SUBCUTANEOUS AS NEEDED
Status: DISCONTINUED | OUTPATIENT
Start: 2019-09-09 | End: 2019-09-09

## 2019-09-09 RX ORDER — CEFAZOLIN SODIUM/WATER 2 G/20 ML
2 SYRINGE (ML) INTRAVENOUS ONCE
Status: COMPLETED | OUTPATIENT
Start: 2019-09-09 | End: 2019-09-09

## 2019-09-09 RX ORDER — HYDROCODONE BITARTRATE AND ACETAMINOPHEN 5; 325 MG/1; MG/1
2 TABLET ORAL AS NEEDED
Status: COMPLETED | OUTPATIENT
Start: 2019-09-09 | End: 2019-09-09

## 2019-09-09 RX ORDER — ONDANSETRON 2 MG/ML
4 INJECTION INTRAMUSCULAR; INTRAVENOUS AS NEEDED
Status: DISCONTINUED | OUTPATIENT
Start: 2019-09-09 | End: 2019-09-09

## 2019-09-09 RX ORDER — ACETAMINOPHEN 500 MG
500 TABLET ORAL EVERY 6 HOURS PRN
Status: ON HOLD | COMMUNITY
End: 2019-09-09

## 2019-09-09 RX ORDER — CEPHALEXIN 500 MG/1
500 CAPSULE ORAL 4 TIMES DAILY
Qty: 12 CAPSULE | Refills: 0 | Status: SHIPPED | OUTPATIENT
Start: 2019-09-09 | End: 2019-09-12

## 2019-09-09 RX ORDER — BUPIVACAINE HYDROCHLORIDE AND EPINEPHRINE 2.5; 5 MG/ML; UG/ML
INJECTION, SOLUTION EPIDURAL; INFILTRATION; INTRACAUDAL; PERINEURAL AS NEEDED
Status: DISCONTINUED | OUTPATIENT
Start: 2019-09-09 | End: 2019-09-09 | Stop reason: HOSPADM

## 2019-09-09 RX ORDER — HYDROCODONE BITARTRATE AND ACETAMINOPHEN 5; 325 MG/1; MG/1
1 TABLET ORAL AS NEEDED
Status: COMPLETED | OUTPATIENT
Start: 2019-09-09 | End: 2019-09-09

## 2019-09-09 NOTE — ANESTHESIA POSTPROCEDURE EVALUATION
[facility] Patient Status:  Hospital Outpatient Surgery   Age/Gender 62year old female MRN [id number]   [facility] SURGERY Attending Sherlyn Luna MD   Hosp Day # 0 PCP Sarah Carrillo MD       Anesthesia Post

## 2019-09-09 NOTE — OPERATIVE REPORT
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

## 2019-09-09 NOTE — INTERVAL H&P NOTE
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

## 2019-09-09 NOTE — BRIEF OP NOTE
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

## 2019-09-09 NOTE — ANESTHESIA PREPROCEDURE EVALUATION
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 Post-procedure pain management plan discussed with surgeon and patient. Comment: Plan of GETA discussed with patient.  Risks discussed include dental injury, sore throat, hemodynamic changes,

## 2019-09-19 ENCOUNTER — OFFICE VISIT (OUTPATIENT)
Dept: SURGERY | Facility: CLINIC | Age: 59
End: 2019-09-19
Payer: COMMERCIAL

## 2019-09-19 VITALS — SYSTOLIC BLOOD PRESSURE: 115 MMHG | DIASTOLIC BLOOD PRESSURE: 72 MMHG | HEART RATE: 72 BPM

## 2019-09-19 DIAGNOSIS — G56.01 RIGHT CARPAL TUNNEL SYNDROME: ICD-10-CM

## 2019-09-19 DIAGNOSIS — G56.21 ULNAR NEUROPATHY AT ELBOW, RIGHT: Primary | ICD-10-CM

## 2019-09-19 PROCEDURE — 99024 POSTOP FOLLOW-UP VISIT: CPT | Performed by: NEUROLOGICAL SURGERY

## 2019-09-19 NOTE — PROGRESS NOTES
Neurosurgery Clinic Visit  2019    Keyonna Bynum PCP:  Taina Méndez MD    1960 MRN [id number]     HISTORY OF PRESENT ILLNESS:  Keyonna Bynum is a(n) 62year old female here for follow-up status post Right Carpal Tunl. and Cubital Tunl.

## 2019-09-19 NOTE — PROGRESS NOTES
Pt is here for post op RIGHT CARPAL TUNNEL RELEASE  9/9/19     Pt states that she has been doing well since post op.

## 2019-10-24 ENCOUNTER — OFFICE VISIT (OUTPATIENT)
Dept: SURGERY | Facility: CLINIC | Age: 59
End: 2019-10-24
Payer: COMMERCIAL

## 2019-10-24 VITALS — DIASTOLIC BLOOD PRESSURE: 70 MMHG | SYSTOLIC BLOOD PRESSURE: 120 MMHG | HEART RATE: 72 BPM

## 2019-10-24 DIAGNOSIS — Z98.1 S/P CERVICAL SPINAL FUSION: ICD-10-CM

## 2019-10-24 DIAGNOSIS — G56.21 ULNAR NEUROPATHY AT ELBOW, RIGHT: Primary | ICD-10-CM

## 2019-10-24 DIAGNOSIS — M47.22 CERVICAL SPONDYLOSIS WITH RADICULOPATHY: ICD-10-CM

## 2019-10-24 DIAGNOSIS — G56.01 RIGHT CARPAL TUNNEL SYNDROME: ICD-10-CM

## 2019-10-24 PROCEDURE — 99024 POSTOP FOLLOW-UP VISIT: CPT | Performed by: PHYSICIAN ASSISTANT

## 2019-10-24 RX ORDER — GABAPENTIN 100 MG/1
100 CAPSULE ORAL 2 TIMES DAILY
Qty: 60 CAPSULE | Refills: 2 | Status: SHIPPED | OUTPATIENT
Start: 2019-10-24 | End: 2019-11-15

## 2019-10-24 NOTE — PROGRESS NOTES
Pt is here for post op      RIGHT CARPAL TUNNEL RELEASE  Right General   RIGHT ULNAR NERVE DECOMPRESSION        9/9/19     Pt states that she has been seen improvement since LOV.      Bgs: Daily

## 2019-10-24 NOTE — PROGRESS NOTES
Neurosurgery Clinic Visit  10/24/2019    Carmela Paige PCP:  Renzo Garza MD    1960 MRN [id number]     HISTORY OF PRESENT ILLNESS:  Carmela Paige is a(n) 62year old female who is here 6 weeks s/p carpal and cubital tunnel release.   She st

## 2019-11-15 ENCOUNTER — TELEPHONE (OUTPATIENT)
Dept: SURGERY | Facility: CLINIC | Age: 59
End: 2019-11-15

## 2019-11-15 DIAGNOSIS — M47.22 CERVICAL SPONDYLOSIS WITH RADICULOPATHY: Primary | ICD-10-CM

## 2019-11-15 RX ORDER — GABAPENTIN 100 MG/1
200 CAPSULE ORAL 3 TIMES DAILY
Qty: 180 CAPSULE | Refills: 2 | Status: SHIPPED | OUTPATIENT
Start: 2019-11-15 | End: 2020-10-09

## 2019-11-15 NOTE — TELEPHONE ENCOUNTER
Called patient to clarify Gabapentin and how she is taking it.   Patient stated she is taking 2 pills TID

## 2019-11-15 NOTE — TELEPHONE ENCOUNTER
I called the patient to advise CT is approved for her to schedule and she is requesting more Gabapentin. She states she previously took 2 each 3 times per day and she is out of her current prescription.

## 2019-11-15 NOTE — TELEPHONE ENCOUNTER
Patient has two refills of Gabapentin, however, she has been taking more than prescribed initially. Patient states she is taking it TID to tablets at a time.    Called pharmacy who stated they will need a new prescription if patient is to continue taking t

## 2019-12-26 ENCOUNTER — OFFICE VISIT (OUTPATIENT)
Dept: SURGERY | Facility: CLINIC | Age: 59
End: 2019-12-26
Payer: COMMERCIAL

## 2019-12-26 ENCOUNTER — TELEPHONE (OUTPATIENT)
Dept: FAMILY MEDICINE CLINIC | Facility: CLINIC | Age: 59
End: 2019-12-26

## 2019-12-26 ENCOUNTER — TELEPHONE (OUTPATIENT)
Dept: SURGERY | Facility: CLINIC | Age: 59
End: 2019-12-26

## 2019-12-26 VITALS — DIASTOLIC BLOOD PRESSURE: 78 MMHG | HEART RATE: 80 BPM | SYSTOLIC BLOOD PRESSURE: 120 MMHG

## 2019-12-26 DIAGNOSIS — J38.00 VOCAL CORD PARALYSIS: ICD-10-CM

## 2019-12-26 DIAGNOSIS — M47.22 CERVICAL SPONDYLOSIS WITH RADICULOPATHY: Primary | ICD-10-CM

## 2019-12-26 DIAGNOSIS — G56.21 ULNAR NEUROPATHY AT ELBOW, RIGHT: ICD-10-CM

## 2019-12-26 DIAGNOSIS — R13.12 OROPHARYNGEAL DYSPHAGIA: ICD-10-CM

## 2019-12-26 DIAGNOSIS — M50.122 CERVICAL DISC DISORDER AT C5-C6 LEVEL WITH RADICULOPATHY: ICD-10-CM

## 2019-12-26 DIAGNOSIS — G56.01 RIGHT CARPAL TUNNEL SYNDROME: ICD-10-CM

## 2019-12-26 DIAGNOSIS — Z98.1 S/P CERVICAL SPINAL FUSION: ICD-10-CM

## 2019-12-26 DIAGNOSIS — M54.12 CERVICAL MYELOPATHY WITH CERVICAL RADICULOPATHY (HCC): ICD-10-CM

## 2019-12-26 DIAGNOSIS — G95.9 CERVICAL MYELOPATHY WITH CERVICAL RADICULOPATHY (HCC): ICD-10-CM

## 2019-12-26 PROCEDURE — 99214 OFFICE O/P EST MOD 30 MIN: CPT | Performed by: PHYSICIAN ASSISTANT

## 2019-12-26 RX ORDER — METHYLPREDNISOLONE 4 MG/1
TABLET ORAL
Qty: 1 PACKAGE | Refills: 0 | Status: SHIPPED | OUTPATIENT
Start: 2019-12-26 | End: 2020-03-04 | Stop reason: ALTCHOICE

## 2019-12-26 RX ORDER — CLONAZEPAM 0.5 MG/1
1 TABLET ORAL NIGHTLY PRN
COMMUNITY
Start: 2019-11-15 | End: 2020-09-17

## 2019-12-26 RX ORDER — TRAZODONE HYDROCHLORIDE 50 MG/1
1 TABLET ORAL NIGHTLY
COMMUNITY
Start: 2019-12-04 | End: 2020-08-31

## 2019-12-26 NOTE — TELEPHONE ENCOUNTER
Collar order, insurance card and face sheet faxed to [address] [place] [address] fax #: [phone].

## 2019-12-26 NOTE — TELEPHONE ENCOUNTER
Last dexa scan 2016. Let's have her do this test first (order is in currently) to ensure that she still has osteoporosis before doing Reclast again.

## 2019-12-26 NOTE — TELEPHONE ENCOUNTER
Pt refused to do Dexa scan and would like the Reclast.  According to pt the note from last visit (6 months ago) stated \"Call when you are going for labs for Reclast so we can generate the order\".   Per pt she really would prefer to go straight to Reclast.

## 2019-12-26 NOTE — PROGRESS NOTES
[facility]   Follow up      Jefry 89 is a 62year old female for reevaluation. Status post C4-6 ACDF 6/4/2018 Dr. Srinivasa Rushing with a pseudoarthrosis at both levels.     Status post right upper tunnel release and righ Performed by Alverto Rodriguez MD at [facility] MAIN OR       FAMILY HISTORY:  family history includes Breast Cancer in her paternal grandmother; Breast Cancer (age of onset: 50) in her mother; Cancer in her paternal grandmother; Cancer (age of onset: 50) in h SPINE: Neck pain on flexion, lateral bending and extension. Gait intact. No clonus. Patient can heel and toe walk. Mild right Spurling's. Positive left Correia's. Paraspinal tightness in the right thoracic and right greater than left lumbar.   She has 5.  Further recommendations be forthcoming based upon her imaging  6. She can try wall glides. Other modalities for sleeping were discussed.         Rowena Nguyen M.S., JOSHUA MAN [facility]  [address] [facility], [address] [facility],

## 2019-12-26 NOTE — PATIENT INSTRUCTIONS
Refill policies:    • Allow 2-3 business days for refills; controlled substances may take longer.   • Contact your pharmacy at least 5 days prior to running out of medication and have them send an electronic request or submit request through the “request re Depending on your insurance carrier, approval may take 3-10 days. It is highly recommended patients contact their insurance carrier directly to determine coverage.   If test is done without insurance authorization, patient may be responsible for the entire cervical spine  3. Vista multi-post collar was dispensed  4. Follow-up and a week  5. Further recommendations be forthcoming based upon her imaging  6. She can try wall glides. Other modalities for sleeping were discussed.

## 2019-12-26 NOTE — PROGRESS NOTES
Pt is here for follow up. Imaging: will do this Saturday     Pt states she has been doing heat and ice therapy and has been doing better. Pt states bilateral thumbs having spasms.

## 2019-12-26 NOTE — TELEPHONE ENCOUNTER
Last dexa was actually February 2017 rather than 2016 but we typically do monitor the dexa scan every two years. She was given a dexa scan order in March 2019 and didn't say she wasn't going to go for the test.  If she is no longer osteoporotic, then we don

## 2019-12-29 ENCOUNTER — HOSPITAL ENCOUNTER (OUTPATIENT)
Dept: CT IMAGING | Age: 59
Discharge: HOME | End: 2019-12-29
Attending: PHYSICIAN ASSISTANT
Payer: COMMERCIAL

## 2019-12-29 ENCOUNTER — HOSPITAL ENCOUNTER (OUTPATIENT)
Dept: GENERAL RADIOLOGY | Age: 59
Discharge: HOME | End: 2019-12-29
Attending: PHYSICIAN ASSISTANT
Payer: COMMERCIAL

## 2019-12-29 DIAGNOSIS — Z98.1 S/P CERVICAL SPINAL FUSION: ICD-10-CM

## 2019-12-29 DIAGNOSIS — G56.21 ULNAR NEUROPATHY AT ELBOW, RIGHT: ICD-10-CM

## 2019-12-29 DIAGNOSIS — M47.22 CERVICAL SPONDYLOSIS WITH RADICULOPATHY: ICD-10-CM

## 2019-12-29 DIAGNOSIS — G56.01 RIGHT CARPAL TUNNEL SYNDROME: ICD-10-CM

## 2019-12-29 PROCEDURE — 72040 X-RAY EXAM NECK SPINE 2-3 VW: CPT | Performed by: PHYSICIAN ASSISTANT

## 2019-12-29 PROCEDURE — 72125 CT NECK SPINE W/O DYE: CPT | Performed by: PHYSICIAN ASSISTANT

## 2019-12-30 ENCOUNTER — HOSPITAL ENCOUNTER (OUTPATIENT)
Dept: MRI IMAGING | Age: 59
Discharge: HOME | End: 2019-12-30
Attending: PHYSICIAN ASSISTANT
Payer: COMMERCIAL

## 2019-12-30 DIAGNOSIS — M54.12 CERVICAL MYELOPATHY WITH CERVICAL RADICULOPATHY (HCC): ICD-10-CM

## 2019-12-30 DIAGNOSIS — Z98.1 S/P CERVICAL SPINAL FUSION: ICD-10-CM

## 2019-12-30 DIAGNOSIS — G95.9 CERVICAL MYELOPATHY WITH CERVICAL RADICULOPATHY (HCC): ICD-10-CM

## 2019-12-30 DIAGNOSIS — M50.122 CERVICAL DISC DISORDER AT C5-C6 LEVEL WITH RADICULOPATHY: ICD-10-CM

## 2019-12-30 DIAGNOSIS — M47.22 CERVICAL SPONDYLOSIS WITH RADICULOPATHY: ICD-10-CM

## 2019-12-30 PROCEDURE — 72141 MRI NECK SPINE W/O DYE: CPT | Performed by: PHYSICIAN ASSISTANT

## 2020-01-07 ENCOUNTER — TELEPHONE (OUTPATIENT)
Dept: FAMILY MEDICINE CLINIC | Facility: CLINIC | Age: 60
End: 2020-01-07

## 2020-01-07 ENCOUNTER — TELEPHONE (OUTPATIENT)
Dept: HEMATOLOGY/ONCOLOGY | Facility: HOSPITAL | Age: 60
End: 2020-01-07

## 2020-01-07 NOTE — TELEPHONE ENCOUNTER
Terrance Méndez RN from cancer center calling regarding pt's reclast. Scheduled for infusion 1/13. She reports pt called there this am and asked them to send an rx for the reclast to [facility]? ?  Per chart I don't see we have ever done this for her.  I a

## 2020-01-07 NOTE — TELEPHONE ENCOUNTER
I have never ordered at a pharmacy. We have always had the patients get it at the cancer center. Is Ashely Soto aware that any patients need to do this in order for it to be covered by their insurance?

## 2020-01-07 NOTE — TELEPHONE ENCOUNTER
Pt called [address] [place] [address],[address] pharmacy and requested infusion send an order to [facility]. Spoke with Elida at MD office and explained call to her.   She is unaware that RX needed to be filled by outside pharm and this has not been the case in the pas

## 2020-01-09 NOTE — TELEPHONE ENCOUNTER
I s/w Mihai Pierre at [facility]. She said the pt s/w the tech at their pharmacy with this request.  Tung Govea is not aware of anyone there telling her this information.  She is not sure where pt rec'd that information but they have never done that with their infusions

## 2020-01-10 ENCOUNTER — TELEPHONE (OUTPATIENT)
Dept: HEMATOLOGY/ONCOLOGY | Facility: HOSPITAL | Age: 60
End: 2020-01-10

## 2020-01-10 ENCOUNTER — OFFICE VISIT (OUTPATIENT)
Dept: SURGERY | Facility: CLINIC | Age: 60
End: 2020-01-10
Payer: COMMERCIAL

## 2020-01-10 DIAGNOSIS — M50.122 CERVICAL DISC DISORDER AT C5-C6 LEVEL WITH RADICULOPATHY: ICD-10-CM

## 2020-01-10 DIAGNOSIS — M54.12 CERVICAL MYELOPATHY WITH CERVICAL RADICULOPATHY (HCC): ICD-10-CM

## 2020-01-10 DIAGNOSIS — S12.9XXS PSEUDOARTHROSIS OF CERVICAL SPINE, SEQUELA: ICD-10-CM

## 2020-01-10 DIAGNOSIS — G95.9 CERVICAL MYELOPATHY WITH CERVICAL RADICULOPATHY (HCC): ICD-10-CM

## 2020-01-10 DIAGNOSIS — G56.01 RIGHT CARPAL TUNNEL SYNDROME: ICD-10-CM

## 2020-01-10 DIAGNOSIS — M47.22 CERVICAL SPONDYLOSIS WITH RADICULOPATHY: Primary | ICD-10-CM

## 2020-01-10 DIAGNOSIS — Z98.1 S/P CERVICAL SPINAL FUSION: ICD-10-CM

## 2020-01-10 PROCEDURE — 99214 OFFICE O/P EST MOD 30 MIN: CPT | Performed by: PHYSICIAN ASSISTANT

## 2020-01-10 RX ORDER — ZOLEDRONIC ACID 5 MG/100ML
INJECTION, SOLUTION INTRAVENOUS
Qty: 100 ML | Refills: 0 | Status: SHIPPED | OUTPATIENT
Start: 2020-01-10 | End: 2021-05-19 | Stop reason: ALTCHOICE

## 2020-01-10 RX ORDER — METHYLPREDNISOLONE 4 MG/1
TABLET ORAL
Qty: 1 PACKAGE | Refills: 0 | Status: SHIPPED | OUTPATIENT
Start: 2020-01-10 | End: 2020-03-04 | Stop reason: ALTCHOICE

## 2020-01-10 RX ORDER — HYDROCODONE BITARTRATE AND ACETAMINOPHEN 5; 325 MG/1; MG/1
1 TABLET ORAL EVERY 4 HOURS PRN
Qty: 45 TABLET | Refills: 0 | Status: SHIPPED | OUTPATIENT
Start: 2020-01-10 | End: 2020-05-18

## 2020-01-10 NOTE — PROGRESS NOTES
Pt is here for follow up     Medrol Dosepak: Completed. Imaging: CT of the cervical     MRI of the cervical     Xray of the cervical     Vista multi-post collar: Slight improvement with brace in the bilateral hands.

## 2020-01-10 NOTE — TELEPHONE ENCOUNTER
Call from [facility]/yryykgwfo-[phone]-requesting order from our ofc for pt to receive reclast 1/16/2020 at [facility]. Advised of all previous info noted below.  Informed I will call [facility] again to confirm their

## 2020-01-10 NOTE — PATIENT INSTRUCTIONS
Refill policies:    • Allow 2-3 business days for refills; controlled substances may take longer.   • Contact your pharmacy at least 5 days prior to running out of medication and have them send an electronic request or submit request through the “request re Depending on your insurance carrier, approval may take 3-10 days. It is highly recommended patients contact their insurance carrier directly to determine coverage.   If test is done without insurance authorization, patient may be responsible for the entire stenosis and myelopathy  3.  Cervical myelopathy and right arm radiculopathy  4. Status post right carpal tunnel release and ulnar transposition 9/9/2019  5. Urinary urgency improved  6. Bilateral thumb snapping tendon versus etiology  7.   Left C7 radic

## 2020-01-10 NOTE — PROGRESS NOTES
[facility]   Follow up      Carlanehaainsley Giovanny is a 62year old female for reevaluation. She has been using the Bainbridge therapy collar. She states overall she is feel improvement in her arms and legs.   She feels like her righ HYSTERECTOMY  1988    with SSO   • OTHER  06/04/2018    CERVICAL 4 - CERVICAL 5, CERVICAL 5 - CERVICAL 6  ANTERIOR CERVICAL DISCECTOMY AND FUSION WITH INSTRUMENTATION, ALLOGRAFT AND  ALL INDICATED PROCEDURES   • OTHER  09/09/2019    RIGHT CARPAL TUNNEL REL well.    NEUROLOGICAL:  This patient is alert and orientated x 3. Speech fluent. Comprehension intact. Face is symmetrical.    SPINE: Neck pain on flexion, lateral bending and extension. Gait intact. No clonus. Patient can heel and toe walk.   Mild righ shows C4-5 C5-6 pseudoarthrosis    X-rays of the cervical spine 8/12/2019 show loss of lordosis with anterior plate spanning Z2-0 T7-3 with lucency around the grafts consistent with a pseudoarthrosis.       ASSESSMENT:  1.  C4-6 ACDF with C5-6 pseudoarthros

## 2020-01-10 NOTE — TELEPHONE ENCOUNTER
Received call from [facility]. Asking for RX for Reclast to be sent to them to be filled. Informed pharmacy that we infuse the drug only.   The prescription needs to come from the ordering MD. Once received, [facility] confirm delivery and

## 2020-01-16 ENCOUNTER — OFFICE VISIT (OUTPATIENT)
Dept: HEMATOLOGY/ONCOLOGY | Facility: HOSPITAL | Age: 60
End: 2020-01-16
Attending: FAMILY MEDICINE
Payer: COMMERCIAL

## 2020-01-16 VITALS
HEIGHT: 64 IN | TEMPERATURE: 97 F | HEART RATE: 83 BPM | OXYGEN SATURATION: 98 % | SYSTOLIC BLOOD PRESSURE: 155 MMHG | RESPIRATION RATE: 16 BRPM | BODY MASS INDEX: 24.59 KG/M2 | WEIGHT: 144 LBS | DIASTOLIC BLOOD PRESSURE: 74 MMHG

## 2020-01-16 DIAGNOSIS — M81.0 OSTEOPOROSIS, UNSPECIFIED OSTEOPOROSIS TYPE, UNSPECIFIED PATHOLOGICAL FRACTURE PRESENCE: ICD-10-CM

## 2020-01-16 DIAGNOSIS — M81.0 SENILE OSTEOPOROSIS: Primary | ICD-10-CM

## 2020-01-16 LAB
ALBUMIN SERPL-MCNC: 3.7 G/DL (ref 3.4–5)
CALCIUM BLD-MCNC: 8.7 MG/DL (ref 8.5–10.1)
CREAT BLD-MCNC: 0.75 MG/DL (ref 0.55–1.02)

## 2020-01-16 PROCEDURE — 82310 ASSAY OF CALCIUM: CPT

## 2020-01-16 PROCEDURE — 36415 COLL VENOUS BLD VENIPUNCTURE: CPT

## 2020-01-16 PROCEDURE — 96365 THER/PROPH/DIAG IV INF INIT: CPT

## 2020-01-16 PROCEDURE — 82565 ASSAY OF CREATININE: CPT

## 2020-01-16 PROCEDURE — 82040 ASSAY OF SERUM ALBUMIN: CPT

## 2020-01-16 RX ORDER — ZOLEDRONIC ACID 5 MG/100ML
5 INJECTION, SOLUTION INTRAVENOUS ONCE
Status: COMPLETED | OUTPATIENT
Start: 2020-01-16 | End: 2020-01-16

## 2020-01-16 RX ORDER — ZOLEDRONIC ACID 5 MG/100ML
5 INJECTION, SOLUTION INTRAVENOUS ONCE
Status: CANCELLED | OUTPATIENT
Start: 2020-01-16

## 2020-01-16 RX ADMIN — ZOLEDRONIC ACID 5 MG: 5 INJECTION, SOLUTION INTRAVENOUS at 11:38:00

## 2020-01-16 NOTE — PROGRESS NOTES
Education Record    Learner:  Patient    Disease / Diagnosis: Osteoporosis - IV Reclast infusion    Barriers / Limitations:  None   Comments:    Method:  Brief focused and Reinforcement   Comments:    General Topics:  Plan of care reviewed   Comments:    O

## 2020-01-17 ENCOUNTER — TELEPHONE (OUTPATIENT)
Dept: FAMILY MEDICINE CLINIC | Facility: CLINIC | Age: 60
End: 2020-01-17

## 2020-01-17 NOTE — TELEPHONE ENCOUNTER
I spoke with Luba burciaga [place] [facility] pharmacy. She was asking for a refill for Reclast for pt. After reviewing the chart I informed her we sent a refill to the [place] pharmacy at [facility] on 01/10/2020. Pt had her infusion yesterday.  It is given o

## 2020-02-14 ENCOUNTER — TELEPHONE (OUTPATIENT)
Dept: SURGERY | Facility: CLINIC | Age: 60
End: 2020-02-14

## 2020-02-14 NOTE — TELEPHONE ENCOUNTER
Called and spoke to Pt relayed message below. Pt reminder to obtain imaging (Xray of the cervical) before the next office visit with Lupe Bedoya     Appointment date: 2/21/2020 @ 9:30.

## 2020-02-21 ENCOUNTER — HOSPITAL ENCOUNTER (OUTPATIENT)
Dept: GENERAL RADIOLOGY | Facility: HOSPITAL | Age: 60
Discharge: HOME | End: 2020-02-21
Attending: PHYSICIAN ASSISTANT
Payer: COMMERCIAL

## 2020-02-21 ENCOUNTER — OFFICE VISIT (OUTPATIENT)
Dept: SURGERY | Facility: CLINIC | Age: 60
End: 2020-02-21
Payer: COMMERCIAL

## 2020-02-21 VITALS — DIASTOLIC BLOOD PRESSURE: 76 MMHG | HEART RATE: 80 BPM | SYSTOLIC BLOOD PRESSURE: 126 MMHG

## 2020-02-21 DIAGNOSIS — M47.22 CERVICAL SPONDYLOSIS WITH RADICULOPATHY: ICD-10-CM

## 2020-02-21 DIAGNOSIS — M54.12 CERVICAL MYELOPATHY WITH CERVICAL RADICULOPATHY (HCC): ICD-10-CM

## 2020-02-21 DIAGNOSIS — S12.9XXS PSEUDOARTHROSIS OF CERVICAL SPINE, SEQUELA: ICD-10-CM

## 2020-02-21 DIAGNOSIS — G95.9 CERVICAL MYELOPATHY WITH CERVICAL RADICULOPATHY (HCC): ICD-10-CM

## 2020-02-21 DIAGNOSIS — Z98.1 S/P CERVICAL SPINAL FUSION: ICD-10-CM

## 2020-02-21 DIAGNOSIS — M47.22 CERVICAL SPONDYLOSIS WITH RADICULOPATHY: Primary | ICD-10-CM

## 2020-02-21 DIAGNOSIS — M50.122 CERVICAL DISC DISORDER AT C5-C6 LEVEL WITH RADICULOPATHY: ICD-10-CM

## 2020-02-21 DIAGNOSIS — M50.021 CERVICAL DISC DISORDER AT C4-C5 LEVEL WITH MYELOPATHY: ICD-10-CM

## 2020-02-21 PROCEDURE — 99214 OFFICE O/P EST MOD 30 MIN: CPT | Performed by: PHYSICIAN ASSISTANT

## 2020-02-21 PROCEDURE — 72050 X-RAY EXAM NECK SPINE 4/5VWS: CPT | Performed by: PHYSICIAN ASSISTANT

## 2020-02-21 NOTE — PROGRESS NOTES
Pt is here for follow up     Multi-Podus collar: Daily     X-rays of the cervical: Obtained     Pt states that she has she has seen no improvement from the LOV.  Pt states that no new symptome's

## 2020-02-21 NOTE — PROGRESS NOTES
[facility]   Follow up      Jefry Baltazar is a 62year old female for reevaluation. She continues with back pain.   She continues with bilateral arm pain mostly in the C6 dermatome she continues with weakness in her arms • PONV (postoperative nausea and vomiting)        PAST SURGICAL HISTORY:  Past Surgical History:   Procedure Laterality Date   • ANTERIOR CERVICAL FUSION BG & INST 2 LEVEL N/A 6/4/2018    Performed by Ryan Rodrigues MD at [facility] OR   • BREAST DANISHA drugs. ALLERGIES:    Dander                  ANAPHYLAXIS    Comment:Dogs and cats  Iodine (Topical)        HIVES, TONGUE SWELLING    Comment:Fish iodine  Radiology Contrast *    ANAPHYLAXIS    REVIEW OF SYSTEMS:  A 10-point system was reviewed.   Pertine left foraminal stenosis. She has C3-4 central stenosis with flattening of the anterior cord.     CT of the cervical spine from 12/29/2019 shows a bony fusion at C4-5 she has a fusion forming but immature with a pseudoarthrosis at C5-6    X-rays of the cerv

## 2020-02-21 NOTE — PATIENT INSTRUCTIONS
Refill policies:    • Allow 2-3 business days for refills; controlled substances may take longer.   • Contact your pharmacy at least 5 days prior to running out of medication and have them send an electronic request or submit request through the “request re Depending on your insurance carrier, approval may take 3-10 days. It is highly recommended patients contact their insurance carrier directly to determine coverage.   If test is done without insurance authorization, patient may be responsible for the entire surgical options with Dr. Yo Barboza  3. Limit activities    Images reviewed she understands she has a C4-5 C5-6 pseudoarthrosis based on CT and radiographs. She has flattening of the spinal cord at C3-4 with likely myelopathy from this.   She has a mixture

## 2020-02-25 ENCOUNTER — OFFICE VISIT (OUTPATIENT)
Dept: SURGERY | Facility: CLINIC | Age: 60
End: 2020-02-25
Payer: COMMERCIAL

## 2020-02-25 ENCOUNTER — TELEPHONE (OUTPATIENT)
Dept: SURGERY | Facility: CLINIC | Age: 60
End: 2020-02-25

## 2020-02-25 VITALS — DIASTOLIC BLOOD PRESSURE: 80 MMHG | SYSTOLIC BLOOD PRESSURE: 130 MMHG | HEART RATE: 78 BPM

## 2020-02-25 DIAGNOSIS — M50.122 CERVICAL DISC DISORDER AT C5-C6 LEVEL WITH RADICULOPATHY: ICD-10-CM

## 2020-02-25 DIAGNOSIS — Z98.1 S/P CERVICAL SPINAL FUSION: ICD-10-CM

## 2020-02-25 DIAGNOSIS — M54.12 CERVICAL MYELOPATHY WITH CERVICAL RADICULOPATHY (HCC): ICD-10-CM

## 2020-02-25 DIAGNOSIS — G95.9 CERVICAL MYELOPATHY WITH CERVICAL RADICULOPATHY (HCC): ICD-10-CM

## 2020-02-25 DIAGNOSIS — M47.22 CERVICAL SPONDYLOSIS WITH RADICULOPATHY: Primary | ICD-10-CM

## 2020-02-25 DIAGNOSIS — S12.9XXS PSEUDOARTHROSIS OF CERVICAL SPINE, SEQUELA: ICD-10-CM

## 2020-02-25 PROCEDURE — 99214 OFFICE O/P EST MOD 30 MIN: CPT | Performed by: PHYSICIAN ASSISTANT

## 2020-02-25 NOTE — PROGRESS NOTES
Pt is here for sx discussion. Pt states she feels more pain in upper back and neck since LOV. Pt states her neck feels stiff.

## 2020-02-25 NOTE — TELEPHONE ENCOUNTER
You are scheduled for C 4-5, 5-6 posterior spinal fusion on TBD with .     Pre-op instructions discussed with patient and surgical packet provided:    · You will need to contact the Pre-admission department at [phone] to schedule your pre-op surgery. If there are any questions regarding your collar you may reach [facility] at phone #: [phone]. · You may need an external bone growth stimulator. If ordered for your surgery DOMENICO Pat will contact you either before or after your surgery.

## 2020-02-25 NOTE — PROGRESS NOTES
[facility]   Follow up      Jefry 89 is a 62year old female for reevaluation and surgery discussion. Left thumb decreased control. Right thumb better. Bilateral arm weakness. Neck collar helps strength.  Left thumb, She does get cramping in her feet and legs at night. She does have urinary urgency but no incontinence. He is using a bone growth stimulator.     PAST MEDICAL HISTORY:  Past Medical History:   Diagnosis Date   • Osteoporosis    • PONV (postoperative nause her paternal uncle; osteoporosis in her mother. SOCIAL HISTORY:   reports that she has never smoked. She has never used smokeless tobacco. She reports current alcohol use. She reports that she does not use drugs.     ALLERGIES:    Dander has spondylosis at C6-7. She has no change in the screw at C5. MRI of the cervical spine shows surgical changes at C4-5 and C5-6. She has right C5-6 foraminal stenosis. She has a diffuse disc bulge at C6-7 with some left foraminal stenosis.   She has future.       Romelia Rachel M.S., PA-C  [address], [facility], [address]  [phone]

## 2020-02-25 NOTE — PATIENT INSTRUCTIONS
Refill policies:    • Allow 2-3 business days for refills; controlled substances may take longer.   • Contact your pharmacy at least 5 days prior to running out of medication and have them send an electronic request or submit request through the “request re Depending on your insurance carrier, approval may take 3-10 days. It is highly recommended patients contact their insurance carrier directly to determine coverage.   If test is done without insurance authorization, patient may be responsible for the entire orders placed. 3.  Limit activities  4. FU postop Dr. Kehinde Howard are scheduled for C 4-5, 5-6 posterior spinal fusion on TBD with .     Pre-op instructions discussed with patient and surgical packet provided:    · You will need to contact hours and 4 hours prior to your scheduled surgery time, Do not drink any other liquids (including water) before your surgery. Do not chew gum or eat candies before surgery.   Take 1000 mg of Tylenol (Acetaminophen) 4 hours before your scheduled surgery time Parking garage, check in at registration and meet by the fish tank in the [facility] for your escort to class on the Ortho Spine unit. If unable to attend, class is available online at [email or web address].  Please call the Care Coordinator, Bernardo Elder

## 2020-02-27 ENCOUNTER — TELEPHONE (OUTPATIENT)
Dept: FAMILY MEDICINE CLINIC | Facility: CLINIC | Age: 60
End: 2020-02-27

## 2020-02-27 DIAGNOSIS — Z01.818 PRE-OP TESTING: Primary | ICD-10-CM

## 2020-02-27 NOTE — TELEPHONE ENCOUNTER
Received notice that pt will be having C4-5, 5-6 posterior spinal fusion on 4/13/2020 with Dr Sean Bai at [facility].  Pt is to have CBC, CMP, Type and Screen (if applicable), PT/PTT/INR, EKG and CXR (if warranted) and MRSA/MSSA nasal swab.   Orders pe

## 2020-02-27 NOTE — TELEPHONE ENCOUNTER
Spoke with Bowen Cunningham from [facility]/Dr. Bonilla's office. Confirmed with Bowen Cunningham that we are requesting the T and S lab pre-op prior to C4-5-6 posterior spinal fusion. Bowen Cunningham thanked me for the information and the call was ended.

## 2020-02-27 NOTE — TELEPHONE ENCOUNTER
Called to Dr Rob Ruiz office and spoke with HUMA Fuller. Rosalio Fuller stated that, yes, the pt needs type and screen. Rosalio Fuller stated that the area of the body from the neck to the top of the head would need this test.  Orders entered.   In addition to testing pt ne

## 2020-02-28 NOTE — TELEPHONE ENCOUNTER
Order for DVT prophylaxis device, patient face sheet, and insurance information faxed to [facility] at fax number:    [phone]    Please note, patient has BGS from [facility], and Tor from [employer] is aware that patient has some months left on viability of th

## 2020-03-02 ENCOUNTER — HOSPITAL ENCOUNTER (OUTPATIENT)
Dept: MAMMOGRAPHY | Facility: HOSPITAL | Age: 60
Discharge: HOME | End: 2020-03-02
Attending: FAMILY MEDICINE
Payer: COMMERCIAL

## 2020-03-02 DIAGNOSIS — Z12.39 BREAST CANCER SCREENING: ICD-10-CM

## 2020-03-02 PROCEDURE — 77063 BREAST TOMOSYNTHESIS BI: CPT | Performed by: FAMILY MEDICINE

## 2020-03-02 PROCEDURE — 77067 SCR MAMMO BI INCL CAD: CPT | Performed by: FAMILY MEDICINE

## 2020-03-04 NOTE — TELEPHONE ENCOUNTER
Call from pt-sts has preop visit scheduled w adina 3/17 but is doing preop testing 3/18/2020. Requesting change in appt by few days so adina has results  Rescheduled to adina GARRIDO next available preop appt-3/19/2020.  Patient voices understanding/

## 2020-03-18 ENCOUNTER — LABORATORY ENCOUNTER (OUTPATIENT)
Dept: LAB | Facility: HOSPITAL | Age: 60
End: 2020-03-18
Attending: FAMILY MEDICINE
Payer: COMMERCIAL

## 2020-03-18 ENCOUNTER — HOSPITAL ENCOUNTER (OUTPATIENT)
Dept: GENERAL RADIOLOGY | Facility: HOSPITAL | Age: 60
Discharge: HOME | End: 2020-03-18
Attending: NEUROLOGICAL SURGERY
Payer: COMMERCIAL

## 2020-03-18 DIAGNOSIS — M47.22 CERVICAL SPONDYLOSIS WITH RADICULOPATHY: ICD-10-CM

## 2020-03-18 DIAGNOSIS — Z01.818 PRE-OP TESTING: ICD-10-CM

## 2020-03-18 LAB
ALBUMIN SERPL-MCNC: 3.7 G/DL (ref 3.4–5)
ALBUMIN/GLOB SERPL: 1.2 {RATIO} (ref 1–2)
ALP LIVER SERPL-CCNC: 49 U/L (ref 50–130)
ALT SERPL-CCNC: 24 U/L (ref 13–56)
ANION GAP SERPL CALC-SCNC: 1 MMOL/L (ref 0–18)
ANTIBODY SCREEN: NEGATIVE
APTT PPP: 26.9 SECONDS (ref 25.4–36.1)
AST SERPL-CCNC: 16 U/L (ref 15–37)
ATRIAL RATE: 64 BPM
BASOPHILS # BLD AUTO: 0.03 X10(3) UL (ref 0–0.2)
BASOPHILS NFR BLD AUTO: 0.8 %
BILIRUB SERPL-MCNC: 0.4 MG/DL (ref 0.1–2)
BUN BLD-MCNC: 24 MG/DL (ref 7–18)
BUN/CREAT SERPL: 30 (ref 10–20)
CALCIUM BLD-MCNC: 8.6 MG/DL (ref 8.5–10.1)
CHLORIDE SERPL-SCNC: 109 MMOL/L (ref 98–112)
CO2 SERPL-SCNC: 29 MMOL/L (ref 21–32)
CREAT BLD-MCNC: 0.8 MG/DL (ref 0.55–1.02)
DEPRECATED RDW RBC AUTO: 44.8 FL (ref 35.1–46.3)
EOSINOPHIL # BLD AUTO: 0.1 X10(3) UL (ref 0–0.7)
EOSINOPHIL NFR BLD AUTO: 2.7 %
ERYTHROCYTE [DISTWIDTH] IN BLOOD BY AUTOMATED COUNT: 12.7 % (ref 11–15)
GLOBULIN PLAS-MCNC: 3 G/DL (ref 2.8–4.4)
GLUCOSE BLD-MCNC: 94 MG/DL (ref 70–99)
HCT VFR BLD AUTO: 41.1 % (ref 35–48)
HGB BLD-MCNC: 13.6 G/DL (ref 12–16)
IMM GRANULOCYTES # BLD AUTO: 0.01 X10(3) UL (ref 0–1)
IMM GRANULOCYTES NFR BLD: 0.3 %
INR BLD: 1.01 (ref 0.9–1.1)
LYMPHOCYTES # BLD AUTO: 1.36 X10(3) UL (ref 1–4)
LYMPHOCYTES NFR BLD AUTO: 36.7 %
M PROTEIN MFR SERPL ELPH: 6.7 G/DL (ref 6.4–8.2)
MCH RBC QN AUTO: 31.6 PG (ref 26–34)
MCHC RBC AUTO-ENTMCNC: 33.1 G/DL (ref 31–37)
MCV RBC AUTO: 95.4 FL (ref 80–100)
MONOCYTES # BLD AUTO: 0.19 X10(3) UL (ref 0.1–1)
MONOCYTES NFR BLD AUTO: 5.1 %
NEUTROPHILS # BLD AUTO: 2.02 X10 (3) UL (ref 1.5–7.7)
NEUTROPHILS # BLD AUTO: 2.02 X10(3) UL (ref 1.5–7.7)
NEUTROPHILS NFR BLD AUTO: 54.4 %
OSMOLALITY SERPL CALC.SUM OF ELEC: 292 MOSM/KG (ref 275–295)
P AXIS: 68 DEGREES
P-R INTERVAL: 132 MS
PATIENT FASTING Y/N/NP: YES
PLATELET # BLD AUTO: 211 10(3)UL (ref 150–450)
POTASSIUM SERPL-SCNC: 5 MMOL/L (ref 3.5–5.1)
PSA SERPL DL<=0.01 NG/ML-MCNC: 13.7 SECONDS (ref 12.5–14.7)
Q-T INTERVAL: 432 MS
QRS DURATION: 88 MS
QTC CALCULATION (BEZET): 445 MS
R AXIS: 82 DEGREES
RBC # BLD AUTO: 4.31 X10(6)UL (ref 3.8–5.3)
RH BLOOD TYPE: POSITIVE
SODIUM SERPL-SCNC: 139 MMOL/L (ref 136–145)
T AXIS: 59 DEGREES
VENTRICULAR RATE: 64 BPM
WBC # BLD AUTO: 3.7 X10(3) UL (ref 4–11)

## 2020-03-18 PROCEDURE — 93010 ELECTROCARDIOGRAM REPORT: CPT | Performed by: INTERNAL MEDICINE

## 2020-03-18 PROCEDURE — 85610 PROTHROMBIN TIME: CPT

## 2020-03-18 PROCEDURE — 85730 THROMBOPLASTIN TIME PARTIAL: CPT

## 2020-03-18 PROCEDURE — 87081 CULTURE SCREEN ONLY: CPT

## 2020-03-18 PROCEDURE — 36415 COLL VENOUS BLD VENIPUNCTURE: CPT

## 2020-03-18 PROCEDURE — 71046 X-RAY EXAM CHEST 2 VIEWS: CPT | Performed by: NEUROLOGICAL SURGERY

## 2020-03-18 PROCEDURE — 86900 BLOOD TYPING SEROLOGIC ABO: CPT

## 2020-03-18 PROCEDURE — 85025 COMPLETE CBC W/AUTO DIFF WBC: CPT

## 2020-03-18 PROCEDURE — 80053 COMPREHEN METABOLIC PANEL: CPT

## 2020-03-18 PROCEDURE — 86850 RBC ANTIBODY SCREEN: CPT

## 2020-03-18 PROCEDURE — 86901 BLOOD TYPING SEROLOGIC RH(D): CPT

## 2020-03-18 PROCEDURE — 93005 ELECTROCARDIOGRAM TRACING: CPT

## 2020-03-19 ENCOUNTER — OFFICE VISIT (OUTPATIENT)
Dept: FAMILY MEDICINE CLINIC | Facility: CLINIC | Age: 60
End: 2020-03-19
Payer: COMMERCIAL

## 2020-03-19 VITALS
RESPIRATION RATE: 12 BRPM | TEMPERATURE: 99 F | WEIGHT: 144 LBS | BODY MASS INDEX: 24.59 KG/M2 | HEIGHT: 64 IN | HEART RATE: 72 BPM | SYSTOLIC BLOOD PRESSURE: 104 MMHG | DIASTOLIC BLOOD PRESSURE: 74 MMHG

## 2020-03-19 DIAGNOSIS — M50.122 CERVICAL DISC DISORDER AT C5-C6 LEVEL WITH RADICULOPATHY: ICD-10-CM

## 2020-03-19 DIAGNOSIS — Z01.818 PREOPERATIVE CLEARANCE: Primary | ICD-10-CM

## 2020-03-19 PROBLEM — R79.89 ELEVATED LACTIC ACID LEVEL: Status: RESOLVED | Noted: 2018-06-26 | Resolved: 2020-03-19

## 2020-03-19 PROBLEM — R41.82 ALTERED MENTAL STATUS, UNSPECIFIED ALTERED MENTAL STATUS TYPE: Status: RESOLVED | Noted: 2018-06-26 | Resolved: 2020-03-19

## 2020-03-19 PROBLEM — R11.11 VOMITING WITHOUT NAUSEA, INTRACTABILITY OF VOMITING NOT SPECIFIED, UNSPECIFIED VOMITING TYPE: Status: RESOLVED | Noted: 2018-06-26 | Resolved: 2020-03-19

## 2020-03-19 PROCEDURE — 99242 OFF/OP CONSLTJ NEW/EST SF 20: CPT | Performed by: FAMILY MEDICINE

## 2020-03-19 NOTE — H&P
Aruna Hurd is a 62year old female who presents for a pre-operative physical exam. Patient is to have C4-5, 5 6 posterior spinal fusion, to be done by Dr. Asad Gayle at [facility] on 4/13/20. HPI:   Pt complains of bleeding hemorrhoid.  Hemorrho Procedure Laterality Date   • ANTERIOR CERVICAL FUSION BG & INST 2 LEVEL N/A 6/4/2018    Performed by Lisa Anderson MD at [facility] MAIN OR   • [address], 2008    Breast Enlargement, Fibroid?    • COLONOSCOPY, POSSIBLE BIOPSY Aunt    • Breast Cancer Maternal Aunt         pre menopausal   • Ovarian Cancer Paternal Cousin Female 15        passed away age 15   • Breast Cancer Maternal Aunt         premenopausal      Social History:   Social History    Tobacco Use      Smoking stat [facility] on 4/13/20.  Pt has the following conditions:  Patient Active Problem List:     Vitamin D deficiency     Insomnia, unspecified     Senile osteoporosis     Cervical disc disorder at C5-C6 level with radiculopathy    Patient is in satisfactory condit

## 2020-03-23 ENCOUNTER — TELEPHONE (OUTPATIENT)
Dept: FAMILY MEDICINE CLINIC | Facility: CLINIC | Age: 60
End: 2020-03-23

## 2020-03-23 NOTE — TELEPHONE ENCOUNTER
Pt called looking for prescription of bactroban. I called the pharmacy and gave them a verbal order. For bactroban 2% ointment 1 application in each nostril twice daily for 5 days. 1 gram o refills. Pharmacist verbalized understanding.

## 2020-03-26 NOTE — TELEPHONE ENCOUNTER
Spoke with patient who stated that:    -she understands why her surgery had to be cancelled because of pandemic, but she would like to be on a list to have procedure as soon as opportunity opens up.    -she is willing to have surgery at [place] [address] if they ha

## 2020-05-01 ENCOUNTER — TELEPHONE (OUTPATIENT)
Dept: SURGERY | Facility: CLINIC | Age: 60
End: 2020-05-01

## 2020-05-01 NOTE — TELEPHONE ENCOUNTER
Spoke with patient and informed her that we have not started the process of rescheduling the elective surgeries that were cancelled. Informed her she is on our list of our surgeries to reschedule due to pandemic. Patient understood.

## 2020-05-18 ENCOUNTER — TELEPHONE (OUTPATIENT)
Dept: SURGERY | Facility: CLINIC | Age: 60
End: 2020-05-18

## 2020-05-18 DIAGNOSIS — M47.22 CERVICAL SPONDYLOSIS WITH RADICULOPATHY: Primary | ICD-10-CM

## 2020-05-18 RX ORDER — HYDROCODONE BITARTRATE AND ACETAMINOPHEN 5; 325 MG/1; MG/1
1 TABLET ORAL EVERY 4 HOURS PRN
Qty: 90 TABLET | Refills: 0 | Status: SHIPPED | OUTPATIENT
Start: 2020-05-18 | End: 2020-05-27 | Stop reason: CLARIF

## 2020-05-18 NOTE — TELEPHONE ENCOUNTER
Returned patients call. Reassured patient Dr. John Venegas is making his way through his list of patients in which had to be cancelled due to the current pandemic. Patient asked approximately when sh will be notified. .  Explained hopefull in the next couple

## 2020-05-18 NOTE — TELEPHONE ENCOUNTER
Medication: Norco 5-325 (45 tablets)    Date of last refill: 01/12/20    [place] 74/88/41    Last office visit: 02/25/20    Due back to clinic per last office note:  Post surgery     Date next office visit scheduled:  Ines

## 2020-05-18 NOTE — TELEPHONE ENCOUNTER
Hydrocodone refill #45    Patient informed of 48 hour refill policy excluding weekends and holidays. Informed patient only patient can  the prescription effective April 1, 2017.   Further explained patient will not receive a call back once prescripti

## 2020-05-20 NOTE — TELEPHONE ENCOUNTER
PAT wanting to know if patient will need updated Chest xray, EKG or type and screen. Tests were completed on 3/18/2020. Will verify with  and place case change request to add if updated orders are needed.

## 2020-05-20 NOTE — TELEPHONE ENCOUNTER
Insurance we have on file [facility] [id number] group #[id number] is coming back not effective. I called [phone] and per automated system this plan terminated 5/1/20.     I called the patient and she said that her insurance co did change something but she

## 2020-05-20 NOTE — TELEPHONE ENCOUNTER
You are scheduled for C 4-5, 5-6 posterior spinal fusion on 06/08/20 with .     Pre-op instructions discussed with patient and surgical packet provided:     · You will need to contact the Pre-admission department at [phone] to schedule your surgery. If there are any questions regarding your collar you may reach [facility] at phone #: [phone]. · You may need an external bone growth stimulator. If ordered for your surgery DOMENICO Cardenas will contact you either before or after your surgery.

## 2020-05-21 NOTE — TELEPHONE ENCOUNTER
Pt spoke to [facility] yesterday & was told ins was denied. Pt states there was a glitch in system & we can re-run it by tomorrow 5/22. No info has changed.

## 2020-05-22 RX ORDER — MULTIVIT-MIN/IRON FUM/FOLIC AC 7.5 MG-4
1 TABLET ORAL DAILY
COMMUNITY

## 2020-05-26 NOTE — TELEPHONE ENCOUNTER
Spoke with Dr. Bobby Feldman who stated that patient may change surgery date to 6/29/20. Called patient and informed her that provider agrees to new date.   Patient stated that she has a Covid 19 test and labs scheduled to be completed soon, but will change

## 2020-05-26 NOTE — TELEPHONE ENCOUNTER
**Patient's plan requires inpatient surgical authorization through [employer] and [place]**     Prior authorization request completed for: Posterior C4-5, C5-6 Fusion  Authorization #[id number]  Authorization dates: 05/26/2020-11/22/2020  CPT codes approv

## 2020-05-26 NOTE — TELEPHONE ENCOUNTER
Spoke with patient who stated that:    -she is \"being good\", wearing her brace, taking pain meds as needed, but feels it is in her best interest to change surgery date to 6/29 from 6/8 due to her ability to have family in town to help her on June 29th.

## 2020-05-27 ENCOUNTER — TELEPHONE (OUTPATIENT)
Dept: SURGERY | Facility: CLINIC | Age: 60
End: 2020-05-27

## 2020-05-27 DIAGNOSIS — M47.22 CERVICAL SPONDYLOSIS WITH RADICULOPATHY: ICD-10-CM

## 2020-05-27 RX ORDER — HYDROCODONE BITARTRATE AND ACETAMINOPHEN 5; 325 MG/1; MG/1
1 TABLET ORAL EVERY 4 HOURS PRN
Qty: 90 TABLET | Refills: 0 | Status: ON HOLD | OUTPATIENT
Start: 2020-05-27 | End: 2020-07-02

## 2020-05-27 NOTE — TELEPHONE ENCOUNTER
Called patient back regarding [place] not being able to cover her Norco being that it is for less than 60 days, offered their alternative to using a local pharmacy.   Patient stated, \"well of course I would rather have the 60 day supply they are requestin

## 2020-05-27 NOTE — TELEPHONE ENCOUNTER
[facility], internist won't pay for anything less than 60 day prescription; pt can have filled at local pharmacy or mail order, please call back if needed

## 2020-05-28 ENCOUNTER — TELEPHONE (OUTPATIENT)
Dept: SURGERY | Facility: CLINIC | Age: 60
End: 2020-05-28

## 2020-05-28 DIAGNOSIS — Z98.1 S/P CERVICAL SPINAL FUSION: ICD-10-CM

## 2020-05-28 DIAGNOSIS — M54.12 CERVICAL MYELOPATHY WITH CERVICAL RADICULOPATHY (HCC): ICD-10-CM

## 2020-05-28 DIAGNOSIS — M50.122 CERVICAL DISC DISORDER AT C5-C6 LEVEL WITH RADICULOPATHY: ICD-10-CM

## 2020-05-28 DIAGNOSIS — M47.22 CERVICAL SPONDYLOSIS WITH RADICULOPATHY: Primary | ICD-10-CM

## 2020-05-28 DIAGNOSIS — S12.9XXS PSEUDOARTHROSIS OF CERVICAL SPINE, SEQUELA: ICD-10-CM

## 2020-05-28 DIAGNOSIS — G95.9 CERVICAL MYELOPATHY WITH CERVICAL RADICULOPATHY (HCC): ICD-10-CM

## 2020-05-29 NOTE — TELEPHONE ENCOUNTER
Received a fax from [facility]    Approval #[id number] from 5/29/20-8/29/20

## 2020-06-16 NOTE — TELEPHONE ENCOUNTER
Prior authorization request completed for: Posterior C4-5, C5-6 Fusion  Authorization #[id number]  Authorization dates: 05/26/2020-11/22/2020  CPT codes approved: [address], [address] [place] [address], [facility] 58, [address] and [address]  Number of visits/dates of service approved: 1

## 2020-06-22 ENCOUNTER — LABORATORY ENCOUNTER (OUTPATIENT)
Dept: LAB | Facility: HOSPITAL | Age: 60
End: 2020-06-22
Attending: NEUROLOGICAL SURGERY
Payer: COMMERCIAL

## 2020-06-22 DIAGNOSIS — M47.22 CERVICAL SPONDYLOSIS WITH RADICULOPATHY: ICD-10-CM

## 2020-06-22 PROCEDURE — 85730 THROMBOPLASTIN TIME PARTIAL: CPT

## 2020-06-22 PROCEDURE — 87081 CULTURE SCREEN ONLY: CPT

## 2020-06-22 PROCEDURE — 85610 PROTHROMBIN TIME: CPT

## 2020-06-22 PROCEDURE — 80053 COMPREHEN METABOLIC PANEL: CPT

## 2020-06-22 PROCEDURE — 36415 COLL VENOUS BLD VENIPUNCTURE: CPT

## 2020-06-22 PROCEDURE — 85025 COMPLETE CBC W/AUTO DIFF WBC: CPT

## 2020-06-24 NOTE — PAT NURSING NOTE
Spoke with patient to verify date change.  No changes in medical histort or medications since last phone screen

## 2020-06-26 ENCOUNTER — LAB ENCOUNTER (OUTPATIENT)
Dept: LAB | Facility: HOSPITAL | Age: 60
End: 2020-06-26
Attending: NEUROLOGICAL SURGERY
Payer: COMMERCIAL

## 2020-06-26 DIAGNOSIS — Z01.818 PRE-OP TESTING: Primary | ICD-10-CM

## 2020-06-29 ENCOUNTER — HOSPITAL ENCOUNTER (INPATIENT)
Facility: HOSPITAL | Age: 60
LOS: 3 days | Discharge: HOME | DRG: 472 | End: 2020-07-02
Attending: NEUROLOGICAL SURGERY | Admitting: NEUROLOGICAL SURGERY
Payer: COMMERCIAL

## 2020-06-29 ENCOUNTER — APPOINTMENT (OUTPATIENT)
Dept: GENERAL RADIOLOGY | Facility: HOSPITAL | Age: 60
DRG: 472 | End: 2020-06-29
Attending: NEUROLOGICAL SURGERY
Payer: COMMERCIAL

## 2020-06-29 ENCOUNTER — ANESTHESIA EVENT (OUTPATIENT)
Dept: SURGERY | Facility: HOSPITAL | Age: 60
DRG: 472 | End: 2020-06-29
Payer: COMMERCIAL

## 2020-06-29 ENCOUNTER — ANESTHESIA (OUTPATIENT)
Dept: SURGERY | Facility: HOSPITAL | Age: 60
DRG: 472 | End: 2020-06-29
Payer: COMMERCIAL

## 2020-06-29 DIAGNOSIS — G95.9 CERVICAL MYELOPATHY WITH CERVICAL RADICULOPATHY (HCC): ICD-10-CM

## 2020-06-29 DIAGNOSIS — M47.22 CERVICAL SPONDYLOSIS WITH RADICULOPATHY: Primary | ICD-10-CM

## 2020-06-29 DIAGNOSIS — Z98.1 S/P CERVICAL SPINAL FUSION: ICD-10-CM

## 2020-06-29 DIAGNOSIS — M50.122 CERVICAL DISC DISORDER AT C5-C6 LEVEL WITH RADICULOPATHY: ICD-10-CM

## 2020-06-29 DIAGNOSIS — S12.9XXS PSEUDOARTHROSIS OF CERVICAL SPINE, SEQUELA: ICD-10-CM

## 2020-06-29 DIAGNOSIS — M54.12 CERVICAL MYELOPATHY WITH CERVICAL RADICULOPATHY (HCC): ICD-10-CM

## 2020-06-29 LAB — SARS-COV-2 RNA RESP QL NAA+PROBE: NOT DETECTED

## 2020-06-29 PROCEDURE — 99255 IP/OBS CONSLTJ NEW/EST HI 80: CPT | Performed by: HOSPITALIST

## 2020-06-29 PROCEDURE — 0RG20K1 FUSION OF 2 OR MORE CERVICAL VERTEBRAL JOINTS WITH NONAUTOLOGOUS TISSUE SUBSTITUTE, POSTERIOR APPROACH, POSTERIOR COLUMN, OPEN APPROACH: ICD-10-PCS | Performed by: NEUROLOGICAL SURGERY

## 2020-06-29 PROCEDURE — 76000 FLUOROSCOPY <1 HR PHYS/QHP: CPT | Performed by: NEUROLOGICAL SURGERY

## 2020-06-29 DEVICE — BLOCKER
Type: IMPLANTABLE DEVICE | Site: NECK | Status: FUNCTIONAL
Brand: OASYS

## 2020-06-29 DEVICE — NON-BIASED POLYAXIAL SCREW
Type: IMPLANTABLE DEVICE | Site: NECK | Status: FUNCTIONAL
Brand: OASYS

## 2020-06-29 DEVICE — ROD
Type: IMPLANTABLE DEVICE | Site: NECK | Status: FUNCTIONAL
Brand: OASYS

## 2020-06-29 RX ORDER — MIDAZOLAM HYDROCHLORIDE 1 MG/ML
INJECTION INTRAMUSCULAR; INTRAVENOUS
Status: COMPLETED
Start: 2020-06-29 | End: 2020-06-29

## 2020-06-29 RX ORDER — DOCUSATE SODIUM 100 MG/1
100 CAPSULE, LIQUID FILLED ORAL 2 TIMES DAILY
Status: DISCONTINUED | OUTPATIENT
Start: 2020-06-29 | End: 2020-07-02

## 2020-06-29 RX ORDER — SODIUM CHLORIDE, SODIUM LACTATE, POTASSIUM CHLORIDE, CALCIUM CHLORIDE 600; 310; 30; 20 MG/100ML; MG/100ML; MG/100ML; MG/100ML
INJECTION, SOLUTION INTRAVENOUS CONTINUOUS
Status: DISCONTINUED | OUTPATIENT
Start: 2020-06-29 | End: 2020-06-29 | Stop reason: HOSPADM

## 2020-06-29 RX ORDER — CLONAZEPAM 0.5 MG/1
0.5 TABLET ORAL NIGHTLY PRN
Status: DISCONTINUED | OUTPATIENT
Start: 2020-06-29 | End: 2020-07-02

## 2020-06-29 RX ORDER — HYDROMORPHONE HYDROCHLORIDE 1 MG/ML
0.8 INJECTION, SOLUTION INTRAMUSCULAR; INTRAVENOUS; SUBCUTANEOUS EVERY 2 HOUR PRN
Status: DISCONTINUED | OUTPATIENT
Start: 2020-06-29 | End: 2020-07-02

## 2020-06-29 RX ORDER — HYDROCODONE BITARTRATE AND ACETAMINOPHEN 10; 325 MG/1; MG/1
1 TABLET ORAL EVERY 4 HOURS PRN
Status: DISCONTINUED | OUTPATIENT
Start: 2020-06-29 | End: 2020-07-02

## 2020-06-29 RX ORDER — GABAPENTIN 100 MG/1
200 CAPSULE ORAL 3 TIMES DAILY
Status: DISCONTINUED | OUTPATIENT
Start: 2020-06-29 | End: 2020-07-02

## 2020-06-29 RX ORDER — BISACODYL 10 MG
10 SUPPOSITORY, RECTAL RECTAL
Status: DISCONTINUED | OUTPATIENT
Start: 2020-06-29 | End: 2020-07-02

## 2020-06-29 RX ORDER — ONDANSETRON 2 MG/ML
4 INJECTION INTRAMUSCULAR; INTRAVENOUS EVERY 4 HOURS PRN
Status: ACTIVE | OUTPATIENT
Start: 2020-06-29 | End: 2020-06-30

## 2020-06-29 RX ORDER — PROCHLORPERAZINE EDISYLATE 5 MG/ML
10 INJECTION INTRAMUSCULAR; INTRAVENOUS EVERY 6 HOURS PRN
Status: DISCONTINUED | OUTPATIENT
Start: 2020-06-29 | End: 2020-07-01

## 2020-06-29 RX ORDER — DEXAMETHASONE SODIUM PHOSPHATE 4 MG/ML
VIAL (ML) INJECTION AS NEEDED
Status: DISCONTINUED | OUTPATIENT
Start: 2020-06-29 | End: 2020-06-29 | Stop reason: SURG

## 2020-06-29 RX ORDER — CYCLOBENZAPRINE HCL 10 MG
10 TABLET ORAL 3 TIMES DAILY PRN
Status: DISCONTINUED | OUTPATIENT
Start: 2020-06-29 | End: 2020-07-01

## 2020-06-29 RX ORDER — TRAZODONE HYDROCHLORIDE 50 MG/1
50 TABLET ORAL NIGHTLY
Status: DISCONTINUED | OUTPATIENT
Start: 2020-06-29 | End: 2020-07-02

## 2020-06-29 RX ORDER — ACETAMINOPHEN 500 MG
1000 TABLET ORAL EVERY 6 HOURS PRN
Status: ON HOLD | COMMUNITY
End: 2020-07-02

## 2020-06-29 RX ORDER — ROCURONIUM BROMIDE 10 MG/ML
INJECTION, SOLUTION INTRAVENOUS AS NEEDED
Status: DISCONTINUED | OUTPATIENT
Start: 2020-06-29 | End: 2020-06-29 | Stop reason: SURG

## 2020-06-29 RX ORDER — MIDAZOLAM HYDROCHLORIDE 1 MG/ML
INJECTION INTRAMUSCULAR; INTRAVENOUS AS NEEDED
Status: DISCONTINUED | OUTPATIENT
Start: 2020-06-29 | End: 2020-06-29 | Stop reason: SURG

## 2020-06-29 RX ORDER — LIDOCAINE HYDROCHLORIDE 10 MG/ML
INJECTION, SOLUTION EPIDURAL; INFILTRATION; INTRACAUDAL; PERINEURAL AS NEEDED
Status: DISCONTINUED | OUTPATIENT
Start: 2020-06-29 | End: 2020-06-29 | Stop reason: SURG

## 2020-06-29 RX ORDER — SENNOSIDES 8.6 MG
17.2 TABLET ORAL NIGHTLY
Status: DISCONTINUED | OUTPATIENT
Start: 2020-06-29 | End: 2020-07-02

## 2020-06-29 RX ORDER — BUPIVACAINE HYDROCHLORIDE AND EPINEPHRINE 2.5; 5 MG/ML; UG/ML
INJECTION, SOLUTION EPIDURAL; INFILTRATION; INTRACAUDAL; PERINEURAL AS NEEDED
Status: DISCONTINUED | OUTPATIENT
Start: 2020-06-29 | End: 2020-06-29

## 2020-06-29 RX ORDER — MULTIPLE VITAMINS W/ MINERALS TAB 9MG-400MCG
1 TAB ORAL DAILY
Status: DISCONTINUED | OUTPATIENT
Start: 2020-06-30 | End: 2020-07-02

## 2020-06-29 RX ORDER — ACETAMINOPHEN 325 MG/1
650 TABLET ORAL EVERY 4 HOURS PRN
Status: DISCONTINUED | OUTPATIENT
Start: 2020-06-29 | End: 2020-07-02

## 2020-06-29 RX ORDER — CEFAZOLIN SODIUM/WATER 2 G/20 ML
2 SYRINGE (ML) INTRAVENOUS ONCE
Status: COMPLETED | OUTPATIENT
Start: 2020-06-29 | End: 2020-06-29

## 2020-06-29 RX ORDER — CALCIUM CARBONATE/VITAMIN D3 250-3.125
1 TABLET ORAL DAILY
Status: DISCONTINUED | OUTPATIENT
Start: 2020-06-30 | End: 2020-07-02

## 2020-06-29 RX ORDER — HYDROCODONE BITARTRATE AND ACETAMINOPHEN 10; 325 MG/1; MG/1
2 TABLET ORAL EVERY 4 HOURS PRN
Status: DISCONTINUED | OUTPATIENT
Start: 2020-06-29 | End: 2020-07-02

## 2020-06-29 RX ORDER — ACETAMINOPHEN 500 MG
1000 TABLET ORAL ONCE
Status: DISCONTINUED | OUTPATIENT
Start: 2020-06-29 | End: 2020-07-01

## 2020-06-29 RX ORDER — HYDROMORPHONE HYDROCHLORIDE 1 MG/ML
INJECTION, SOLUTION INTRAMUSCULAR; INTRAVENOUS; SUBCUTANEOUS
Status: COMPLETED
Start: 2020-06-29 | End: 2020-06-29

## 2020-06-29 RX ORDER — EPHEDRINE SULFATE 50 MG/ML
INJECTION, SOLUTION INTRAVENOUS AS NEEDED
Status: DISCONTINUED | OUTPATIENT
Start: 2020-06-29 | End: 2020-06-29 | Stop reason: SURG

## 2020-06-29 RX ORDER — DIAZEPAM 5 MG/1
5 TABLET ORAL EVERY 6 HOURS PRN
Status: DISCONTINUED | OUTPATIENT
Start: 2020-06-29 | End: 2020-07-02

## 2020-06-29 RX ORDER — SODIUM CHLORIDE AND POTASSIUM CHLORIDE .9; .15 G/100ML; G/100ML
75 SOLUTION INTRAVENOUS CONTINUOUS
Status: DISCONTINUED | OUTPATIENT
Start: 2020-06-29 | End: 2020-07-01

## 2020-06-29 RX ORDER — ESTRADIOL 0.1 MG/G
CREAM VAGINAL DAILY
Status: DISCONTINUED | OUTPATIENT
Start: 2020-06-29 | End: 2020-06-29 | Stop reason: RX

## 2020-06-29 RX ORDER — SODIUM CHLORIDE 0.9 % (FLUSH) 0.9 %
SYRINGE (ML) INJECTION AS NEEDED
Status: DISCONTINUED | OUTPATIENT
Start: 2020-06-29 | End: 2020-06-29

## 2020-06-29 RX ORDER — HYDROCODONE BITARTRATE AND ACETAMINOPHEN 5; 325 MG/1; MG/1
2 TABLET ORAL AS NEEDED
Status: DISCONTINUED | OUTPATIENT
Start: 2020-06-29 | End: 2020-06-29 | Stop reason: HOSPADM

## 2020-06-29 RX ORDER — DIPHENHYDRAMINE HYDROCHLORIDE 50 MG/ML
25 INJECTION INTRAMUSCULAR; INTRAVENOUS EVERY 4 HOURS PRN
Status: DISCONTINUED | OUTPATIENT
Start: 2020-06-29 | End: 2020-07-02

## 2020-06-29 RX ORDER — MIDAZOLAM HYDROCHLORIDE 1 MG/ML
1 INJECTION INTRAMUSCULAR; INTRAVENOUS EVERY 5 MIN PRN
Status: DISCONTINUED | OUTPATIENT
Start: 2020-06-29 | End: 2020-06-29 | Stop reason: HOSPADM

## 2020-06-29 RX ORDER — HYDROMORPHONE HYDROCHLORIDE 1 MG/ML
0.4 INJECTION, SOLUTION INTRAMUSCULAR; INTRAVENOUS; SUBCUTANEOUS EVERY 2 HOUR PRN
Status: DISCONTINUED | OUTPATIENT
Start: 2020-06-29 | End: 2020-07-02

## 2020-06-29 RX ORDER — HYDROMORPHONE HYDROCHLORIDE 1 MG/ML
0.2 INJECTION, SOLUTION INTRAMUSCULAR; INTRAVENOUS; SUBCUTANEOUS EVERY 2 HOUR PRN
Status: DISCONTINUED | OUTPATIENT
Start: 2020-06-29 | End: 2020-07-02

## 2020-06-29 RX ORDER — POLYETHYLENE GLYCOL 3350 17 G/17G
17 POWDER, FOR SOLUTION ORAL DAILY PRN
Status: DISCONTINUED | OUTPATIENT
Start: 2020-06-29 | End: 2020-07-02

## 2020-06-29 RX ORDER — ONDANSETRON 2 MG/ML
4 INJECTION INTRAMUSCULAR; INTRAVENOUS AS NEEDED
Status: DISCONTINUED | OUTPATIENT
Start: 2020-06-29 | End: 2020-06-29 | Stop reason: HOSPADM

## 2020-06-29 RX ORDER — HYDROCODONE BITARTRATE AND ACETAMINOPHEN 5; 325 MG/1; MG/1
1 TABLET ORAL AS NEEDED
Status: DISCONTINUED | OUTPATIENT
Start: 2020-06-29 | End: 2020-06-29 | Stop reason: HOSPADM

## 2020-06-29 RX ORDER — DIPHENHYDRAMINE HCL 25 MG
25 CAPSULE ORAL EVERY 4 HOURS PRN
Status: DISCONTINUED | OUTPATIENT
Start: 2020-06-29 | End: 2020-07-02

## 2020-06-29 RX ORDER — DIPHENHYDRAMINE HYDROCHLORIDE 50 MG/ML
12.5 INJECTION INTRAMUSCULAR; INTRAVENOUS AS NEEDED
Status: DISCONTINUED | OUTPATIENT
Start: 2020-06-29 | End: 2020-06-29 | Stop reason: HOSPADM

## 2020-06-29 RX ORDER — HYDROMORPHONE HYDROCHLORIDE 1 MG/ML
0.4 INJECTION, SOLUTION INTRAMUSCULAR; INTRAVENOUS; SUBCUTANEOUS EVERY 5 MIN PRN
Status: DISCONTINUED | OUTPATIENT
Start: 2020-06-29 | End: 2020-06-29 | Stop reason: HOSPADM

## 2020-06-29 RX ORDER — MEPERIDINE HYDROCHLORIDE 25 MG/ML
12.5 INJECTION INTRAMUSCULAR; INTRAVENOUS; SUBCUTANEOUS AS NEEDED
Status: DISCONTINUED | OUTPATIENT
Start: 2020-06-29 | End: 2020-06-29 | Stop reason: HOSPADM

## 2020-06-29 RX ORDER — BACITRACIN 50000 [USP'U]/1
INJECTION, POWDER, LYOPHILIZED, FOR SOLUTION INTRAMUSCULAR AS NEEDED
Status: DISCONTINUED | OUTPATIENT
Start: 2020-06-29 | End: 2020-06-29

## 2020-06-29 RX ORDER — NALOXONE HYDROCHLORIDE 0.4 MG/ML
80 INJECTION, SOLUTION INTRAMUSCULAR; INTRAVENOUS; SUBCUTANEOUS AS NEEDED
Status: DISCONTINUED | OUTPATIENT
Start: 2020-06-29 | End: 2020-06-29 | Stop reason: HOSPADM

## 2020-06-29 RX ORDER — CEFAZOLIN SODIUM/WATER 2 G/20 ML
2 SYRINGE (ML) INTRAVENOUS EVERY 8 HOURS
Status: COMPLETED | OUTPATIENT
Start: 2020-06-29 | End: 2020-06-30

## 2020-06-29 RX ORDER — SODIUM PHOSPHATE, DIBASIC AND SODIUM PHOSPHATE, MONOBASIC 7; 19 G/133ML; G/133ML
1 ENEMA RECTAL ONCE AS NEEDED
Status: DISCONTINUED | OUTPATIENT
Start: 2020-06-29 | End: 2020-07-02

## 2020-06-29 RX ORDER — METOCLOPRAMIDE HYDROCHLORIDE 5 MG/ML
10 INJECTION INTRAMUSCULAR; INTRAVENOUS AS NEEDED
Status: DISCONTINUED | OUTPATIENT
Start: 2020-06-29 | End: 2020-06-29 | Stop reason: HOSPADM

## 2020-06-29 RX ADMIN — ROCURONIUM BROMIDE 50 MG: 10 INJECTION, SOLUTION INTRAVENOUS at 07:41:00

## 2020-06-29 RX ADMIN — LIDOCAINE HYDROCHLORIDE 50 MG: 10 INJECTION, SOLUTION EPIDURAL; INFILTRATION; INTRACAUDAL; PERINEURAL at 07:41:00

## 2020-06-29 RX ADMIN — MIDAZOLAM HYDROCHLORIDE 2 MG: 1 INJECTION INTRAMUSCULAR; INTRAVENOUS at 07:37:00

## 2020-06-29 RX ADMIN — CEFAZOLIN SODIUM/WATER 2 G: 2 G/20 ML SYRINGE (ML) INTRAVENOUS at 07:50:00

## 2020-06-29 RX ADMIN — ROCURONIUM BROMIDE 10 MG: 10 INJECTION, SOLUTION INTRAVENOUS at 09:10:00

## 2020-06-29 RX ADMIN — DEXAMETHASONE SODIUM PHOSPHATE 8 MG: 4 MG/ML VIAL (ML) INJECTION at 08:08:00

## 2020-06-29 RX ADMIN — SODIUM CHLORIDE, SODIUM LACTATE, POTASSIUM CHLORIDE, CALCIUM CHLORIDE: 600; 310; 30; 20 INJECTION, SOLUTION INTRAVENOUS at 09:42:00

## 2020-06-29 RX ADMIN — EPHEDRINE SULFATE 10 MG: 50 INJECTION, SOLUTION INTRAVENOUS at 07:53:00

## 2020-06-29 RX ADMIN — SODIUM CHLORIDE, SODIUM LACTATE, POTASSIUM CHLORIDE, CALCIUM CHLORIDE: 600; 310; 30; 20 INJECTION, SOLUTION INTRAVENOUS at 10:20:00

## 2020-06-29 RX ADMIN — EPHEDRINE SULFATE 5 MG: 50 INJECTION, SOLUTION INTRAVENOUS at 08:35:00

## 2020-06-29 NOTE — PLAN OF CARE
S/p C4-C5,C5-C6 Posterior Cervical Fusion  Admitted via bed. Oriented to room. Passed swallow eval.  Diet ordered.

## 2020-06-29 NOTE — OPERATIVE REPORT
Operative Note    Patient Name: Diogo De Los Santos    Preoperative Diagnosis: Cervical spondylosis with radiculopathy [M47.22]  Cervical myelopathy with cervical radiculopathy [M47.12]  Cervical disc disorder at C5-C6 level with radiculopathy [M50.122]  Pseudo on the right. Unfortunately at C6 she has little bit of abnormal anatomy in her lateral mass and unfortunately the screw would not hold. Another trajectory was attempted but unfortunately the screw would not hold again.   Is felt that we would not put a s

## 2020-06-29 NOTE — BRIEF OP NOTE
Pre-Operative Diagnosis: Cervical spondylosis with radiculopathy [M47.22]  Cervical myelopathy with cervical radiculopathy [M47.12]  Cervical disc disorder at C5-C6 level with radiculopathy [M50.122]  Pseudoarthrosis of cervical spine, sequela [S12. 9XXS]

## 2020-06-29 NOTE — ANESTHESIA PREPROCEDURE EVALUATION
PRE-OP EVALUATION    Patient Name: Cheryle Carolin    Pre-op Diagnosis: Cervical spondylosis with radiculopathy [M47.22]  Cervical myelopathy with cervical radiculopathy [M47.12]  Cervical disc disorder at C5-C6 level with radiculopathy [M50.122]  Pseudoart differently: Apply pea sized amount to external vaginal area at bedtime prn ), Disp: 42.5 g, Rfl: 11  Calcium-Vitamins C & D (CALCIUM/C/D OR), Take 1 tablet by mouth daily. , Disp: , Rfl:   Cholecalciferol (VITAMIN D3 OR), Take 2,000 Units by mouth daily. Rosa Madrid MD at [facility] MAIN OR   • WRIST CARPAL TUNNEL RELEASE Right 9/9/2019    Performed by Rosa Madrid MD at [facility] MAIN OR     Social History    Tobacco Use      Smoking status: Never Smoker      Smokeless tobacco: Never Used    Alcohol use

## 2020-06-29 NOTE — ANESTHESIA POSTPROCEDURE EVALUATION
[facility] Patient Status:  Inpatient   Age/Gender 62year old female MRN [id number]   [facility] SURGERY Attending Ana Engel MD   Hosp Day # 0 PCP Neno Baptiste MD       Anesthesia Post-op Note    Proced

## 2020-06-29 NOTE — H&P
HISTORY OF PRESENT Dariana Lancaster is a 62year old female for reevaluation and surgery discussion. Left thumb decreased control. Right thumb better. Bilateral arm weakness. Neck collar helps strength.  Left thumb, middle finger and lateral finger her feet and legs at night. She does have urinary urgency but no incontinence.   He is using a bone growth stimulator.     PAST MEDICAL HISTORY:       Past Medical History:   Diagnosis Date   • Osteoporosis     • PONV (postoperative nausea and vomiting)   59) in her paternal uncle; osteoporosis in her mother.     SOCIAL HISTORY:   reports that she has never smoked. She has never used smokeless tobacco. She reports current alcohol use.  She reports that she does not use drugs.     ALLERGIES:     Dander some motion at C5-6. She has spondylosis at C6-7. She has no change in the screw at C5.     MRI of the cervical spine shows surgical changes at C4-5 and C5-6. She has right C5-6 foraminal stenosis.   She has a diffuse disc bulge at C6-7 with some left fo

## 2020-06-29 NOTE — CONSULTS
[address] [facility] Patient Status:  Inpatient    1960 MRN [id number]   [facility] 3SW-A Attending Zenobia [place] MD   Hosp Day # 0 PCP Issac Ruiz MD     Reason for consult: med mgt    Request tobacco. She reports current alcohol use. She reports that she does not use drugs.     Family History:   Family History   Problem Relation Age of Onset   • Heart Disorder Father 37        CABG x 5, had stents later in life   • Diabetes Father    • Breast Ca needed. , Disp: , Rfl:   traZODone HCl 50 MG Oral Tab, Take 1 tablet by mouth nightly.  , Disp: , Rfl:   gabapentin 100 MG Oral Cap, Take 2 capsules (200 mg total) by mouth 3 (three) times daily.  (Patient taking differently: Take 200 mg by mouth 3 (three) Appropriate mood and affect. Diagnostic Data:      Labs:  No results for input(s): WBC, HGB, MCV, PLT, BAND, INR in the last 168 hours.     Invalid input(s): LYM#, MONO#, BASOS#, EOSIN#    No results for input(s): GLU, BUN, CREATSERUM, GFRAA, GFRNAA, C

## 2020-06-29 NOTE — ANESTHESIA PROCEDURE NOTES
Airway  Date/Time: 6/29/2020 7:43 AM  Urgency: elective    Airway not difficult    General Information and Staff    Patient location during procedure: OR  Anesthesiologist: Sarah Conklin MD  Resident/CRNA: Luan Tracy CRNA  Performed: CRNA     Ind

## 2020-06-30 LAB
DEPRECATED RDW RBC AUTO: 46 FL (ref 35.1–46.3)
ERYTHROCYTE [DISTWIDTH] IN BLOOD BY AUTOMATED COUNT: 13.2 % (ref 11–15)
HCT VFR BLD AUTO: 32.6 % (ref 35–48)
HGB BLD-MCNC: 10.4 G/DL (ref 12–16)
MCH RBC QN AUTO: 30.5 PG (ref 26–34)
MCHC RBC AUTO-ENTMCNC: 31.9 G/DL (ref 31–37)
MCV RBC AUTO: 95.6 FL (ref 80–100)
PLATELET # BLD AUTO: 174 10(3)UL (ref 150–450)
RBC # BLD AUTO: 3.41 X10(6)UL (ref 3.8–5.3)
WBC # BLD AUTO: 6.2 X10(3) UL (ref 4–11)

## 2020-06-30 PROCEDURE — 99232 SBSQ HOSP IP/OBS MODERATE 35: CPT | Performed by: HOSPITALIST

## 2020-06-30 NOTE — PLAN OF CARE
POD #1, A/Ox4. 2L O2 via NC at HS otherwise RA during day, , IS, BELKIS's, SCD's, VSS, slight weakness to right and left arm, coverlet to incision C/D/I, eleni MARCIAL collar when OOB, voiding freely, LBM 06/28/20, regular diet, denies numbness and tingling, juan

## 2020-06-30 NOTE — PROGRESS NOTES
[facility]  Neurosurgery Progress Note    Sonna Needle Patient Status:  Inpatient    1960 MRN [id number]   [facility] 3SW-A Attending Keegan Aquino MD   Hosp Day # 1 PCP Estefanía Nielson MD     Chief Complaint:  Preston Epstein

## 2020-06-30 NOTE — PLAN OF CARE
Pt AOx4. R.A. C/o moderate/severe incisional pain to posterior neck, relieved by PO pain meds. Coverlet dressing, CDI. Tomball J collar when OOB. VS remain stable, voiding freely. SCD/TEDs bilaterally, ankle pumps encouraged.  Pt walking halls min w/walke

## 2020-06-30 NOTE — PROGRESS NOTES
Pt c/o pain to right shin that wraps toward calf when ambulating. No swelling or redness noted. Not tender to touch, Spine aware.

## 2020-06-30 NOTE — PROGRESS NOTES
[facility]  Progress Note     Guysheila Castañeda Patient Status:  Inpatient    1960 MRN [id number]   [facility] 3SW-A Attending Carlin Singh MD   Hosp Day # 1 PCP Paul Hoffmann MD     Chief Complaint: neck pain    S: P mg Oral Nightly   • multivitamin with minerals  1 tablet Oral Daily   • traZODone HCl  50 mg Oral Nightly   • Senna  17.2 mg Oral Nightly   • docusate sodium  100 mg Oral BID       ASSESSMENT / PLAN:     1. Cervical radiculopathy s/p spinal fusion  1.  Cont

## 2020-06-30 NOTE — OCCUPATIONAL THERAPY NOTE
OCCUPATIONAL THERAPY EVALUATION - INPATIENT     Room Number: 384/384-A  Evaluation Date: 6/30/2020  Type of Evaluation: Initial  Presenting Problem: cervical spinal fusion    Physician Order: IP Consult to Occupational Therapy  Reason for Therapy: ADL/IADL Performed by Heri Alejandro MD at [facility] MAIN OR   • ULNAR NERVE DECOMPRESSION Right 9/9/2019    Performed by Heri Alejandro MD at [facility] MAIN OR   • [address] Right 9/9/2019    Performed by Heri Alejandro MD at [address] [place] [address] NEUROLOGICAL FINDINGS  Neurological Findings: Coordination - Rapid Alternating Movement; Coordination - Finger Opposition     Coordination - Rapid Alternating Movement: Symmetrical  Coordination - Finger Opposition: Symmetrical       ACTIVITY TOLERANCE session/findings;Bracing education provided; All patient questions and concerns addressed; Alarm set; Family present    ASSESSMENT     Patient is a 62year old female admitted on 6/29/2020 for CERVICAL 4-CERVICAL 5 AND CERVICAL 5-CERVICAL 6 POSTERIOR SPINAL F Goals: 2    ADL GOALS   Patient will perform Lower Body Dressing with supervision  Patient will perform Toileting with supervision    FUNCTIONAL TRANSFER GOALS   Patient will transfer to Toilet with supervision    ADDITIONAL GOALS  Patient will recall all

## 2020-07-01 LAB
DEPRECATED RDW RBC AUTO: 46.4 FL (ref 35.1–46.3)
ERYTHROCYTE [DISTWIDTH] IN BLOOD BY AUTOMATED COUNT: 13.1 % (ref 11–15)
HCT VFR BLD AUTO: 32.6 % (ref 35–48)
HGB BLD-MCNC: 10.4 G/DL (ref 12–16)
MCH RBC QN AUTO: 31.1 PG (ref 26–34)
MCHC RBC AUTO-ENTMCNC: 31.9 G/DL (ref 31–37)
MCV RBC AUTO: 97.6 FL (ref 80–100)
PLATELET # BLD AUTO: 166 10(3)UL (ref 150–450)
RBC # BLD AUTO: 3.34 X10(6)UL (ref 3.8–5.3)
WBC # BLD AUTO: 5.5 X10(3) UL (ref 4–11)

## 2020-07-01 PROCEDURE — 99231 SBSQ HOSP IP/OBS SF/LOW 25: CPT | Performed by: HOSPITALIST

## 2020-07-01 RX ORDER — CYCLOBENZAPRINE HCL 10 MG
10 TABLET ORAL EVERY 8 HOURS
Status: DISCONTINUED | OUTPATIENT
Start: 2020-07-01 | End: 2020-07-02

## 2020-07-01 NOTE — OCCUPATIONAL THERAPY NOTE
OCCUPATIONAL THERAPY TREATMENT NOTE - INPATIENT     Room Number: 384/384-A  Session: 1   Number of Visits to Meet Established Goals: 2    Presenting Problem: cervical spinal fusion    History related to current admission: Pt is 62year old female admitted Deborah Weaver MD at [facility] MAIN OR   • WRIST CARPAL TUNNEL RELEASE Right 9/9/2019    Performed by Deborah Weaver MD at [facility] 214  \"I think I'm going to need another day to get this pain under control. \"    Patient self-stated go wishing to continue. Pt completed bed mobility sit > supine SUPV. Pt returned to semi-supine position in bed with all needs met, call light within reach, RN aware of session.    Patient End of Session: In bed;Needs met;Call light within reach;RN aware

## 2020-07-01 NOTE — PROGRESS NOTES
[facility]  Progress Note     Harsha Charles Patient Status:  Inpatient    1960 MRN [id number]   [facility] 3SW-A Attending Milena Alejandro MD   Hosp Day # 2 PCP Clau Pittman MD     Chief Complaint: Cervical radiculo melatonin  5 mg Oral Nightly   • multivitamin with minerals  1 tablet Oral Daily   • traZODone HCl  50 mg Oral Nightly   • Senna  17.2 mg Oral Nightly   • docusate sodium  100 mg Oral BID       ASSESSMENT / PLAN:     1.  Cervical radiculopathy sp spinal fus

## 2020-07-01 NOTE — PROGRESS NOTES
[facility]  Neurosurgery Progress Note    Johanne Vaca Patient Status:  Inpatient    1960 MRN [id number]   [facility] 3SW-A Attending Darryle Gross, MD   Hosp Day # 2 PCP Farnaz Snowden MD     Chief Complaint:  Refedson Campos

## 2020-07-01 NOTE — PHYSICAL THERAPY NOTE
PHYSICAL THERAPY TREATMENT NOTE - INPATIENT    Room Number: 384/384-A     Session: 1   Number of Visits to Meet Established Goals: 3    Presenting Problem: s/p C4-6 posterior fusion 6/29/20  History related to current admission: Pt admitted 6/29/20 for C4  was present. Patient’s self-stated goal is to be pain free.     OBJECTIVE  Precautions: Cervical brace;Spine    WEIGHT BEARING RESTRICTION  Weight Bearing Restriction: None                PAIN ASSESSMENT   Ratin  Location: Bilateral upper tra Patient was independent with bed mobility from hospital bed with [facility] @ 45*No Patient Care Coordination Note on file. has adjustable bed @ home. Patient ambulated 300 ft with RW & 30 ft without AD with supervision for safety.  Patient was able to manage 4

## 2020-07-01 NOTE — PLAN OF CARE
D/c planning home today, explained pain meds- reinforced to use Norco vs Dilaudid IV since no IV meds at home, Flexeril ordered prn also, to be seen by OT, up in chair w/ Pablo J collar in place, will do dsg change when spouse is present.

## 2020-07-01 NOTE — PLAN OF CARE
Pt POD #2 C4-C6 fusion by Dr Guy Bolus. Pt A&Ox4. On room air. IS encouraged. Denies difficulty swallowing. Teds/Scds to BLE. Incision to posterior neck with coverlet samra C/D/KARLEY. Pablo MARCIAL brace when OOB. Tolerating general diet. Last BM 6/28/20.  Voiding with

## 2020-07-01 NOTE — PLAN OF CARE
Flexeril ordered ATC, given x 1, ice packs placed behind bilat shoulders, d/c planning tomorrow,  at bedside.

## 2020-07-02 VITALS
RESPIRATION RATE: 20 BRPM | DIASTOLIC BLOOD PRESSURE: 86 MMHG | BODY MASS INDEX: 24.62 KG/M2 | HEART RATE: 91 BPM | OXYGEN SATURATION: 94 % | WEIGHT: 144.19 LBS | SYSTOLIC BLOOD PRESSURE: 128 MMHG | TEMPERATURE: 99 F | HEIGHT: 64 IN

## 2020-07-02 LAB
DEPRECATED RDW RBC AUTO: 44.6 FL (ref 35.1–46.3)
ERYTHROCYTE [DISTWIDTH] IN BLOOD BY AUTOMATED COUNT: 12.7 % (ref 11–15)
HCT VFR BLD AUTO: 35.8 % (ref 35–48)
HGB BLD-MCNC: 11.6 G/DL (ref 12–16)
MCH RBC QN AUTO: 30.9 PG (ref 26–34)
MCHC RBC AUTO-ENTMCNC: 32.4 G/DL (ref 31–37)
MCV RBC AUTO: 95.5 FL (ref 80–100)
PLATELET # BLD AUTO: 185 10(3)UL (ref 150–450)
RBC # BLD AUTO: 3.75 X10(6)UL (ref 3.8–5.3)
WBC # BLD AUTO: 3.9 X10(3) UL (ref 4–11)

## 2020-07-02 RX ORDER — HYDROCODONE BITARTRATE AND ACETAMINOPHEN 10; 325 MG/1; MG/1
1-2 TABLET ORAL EVERY 4 HOURS PRN
Qty: 90 TABLET | Refills: 0 | Status: SHIPPED | OUTPATIENT
Start: 2020-07-02 | End: 2020-07-13

## 2020-07-02 RX ORDER — CYCLOBENZAPRINE HCL 10 MG
10 TABLET ORAL EVERY 8 HOURS
Qty: 45 TABLET | Refills: 0 | Status: SHIPPED | OUTPATIENT
Start: 2020-07-02 | End: 2020-07-13

## 2020-07-02 RX ORDER — NALOXONE HYDROCHLORIDE 4 MG/.1ML
4 SPRAY, METERED NASAL AS NEEDED
Qty: 1 KIT | Refills: 0 | Status: SHIPPED | OUTPATIENT
Start: 2020-07-02 | End: 2020-08-31

## 2020-07-02 RX ORDER — DIAZEPAM 5 MG/1
5 TABLET ORAL EVERY 6 HOURS PRN
Qty: 20 TABLET | Refills: 0 | Status: SHIPPED | OUTPATIENT
Start: 2020-07-02 | End: 2020-07-13

## 2020-07-02 NOTE — PROGRESS NOTES
[facility]  Neurosurgery Progress Note    Tamela Aldana Patient Status:  Inpatient    1960 MRN [id number]   [facility] 3SW-A Attending Rosa Madrid MD   Hosp Day # 3 PCP Shanon Waller MD     Chief Complaint:  Carmen Lomas

## 2020-07-02 NOTE — PLAN OF CARE
Pain controlled with PO prn meds. Coverlet dressing is CDI. Cervical collar on when OOB. VSS. On RA. Tolerating well ambulating in halls. Teds/scds on. Call light within reach. Will continue to monitor.

## 2020-07-02 NOTE — DISCHARGE SUMMARY
[facility]  Discharge Summary    Lilly Tobin Patient Status:  Inpatient    1960 MRN [id number]   [facility] 3SW-A Attending No att. providers found   Hosp Day # 3 PCP Chapo Abreu MD     Date of Admission: 2020    Da continues with weakness in her arms.  Numbness and difficulty using her left thumb more than her right.  She has urinary urgency. Russell Kawasaki continues with thoracic lumbar tightness.  Bilateral leg cramping.  She has been using a Multi-Podus collar she is been u was improved and she was discharged with scheduled follow up appts and pain medications    Complications: None    Discharge Condition: Stable    Disposition: Home or Self Care    Discharge Medications: Discharge Medication List as of 7/2/2020  1:49 PM    S Historical      STOP taking these medications    acetaminophen 500 MG Oral Tab    HYDROcodone-acetaminophen ([facility]) 5-325 MG Oral Tab          Your appointments     Date & Time Appointment Department ([place])    Jul 15, 2020  1:10 PM CDT Post Op Visit with

## 2020-07-02 NOTE — PLAN OF CARE
Dressing clean, dry,intact. Cervical collar in place. Has ambulated in hallway, gait steady. Denies numbness, tingling. States pain well controlled on oral pain medication. States wants to go home today.   Instructed on plan of care, pain management,

## 2020-07-02 NOTE — PLAN OF CARE
Medication delivered from out patient pharmacy (flexeril, valium, norco & naloxone). Viewed discharge instruction video. Verbal and written discharge instructions reviewed. Verbalized understanding. Spouse at bedside. Awaiting transport.

## 2020-07-02 NOTE — OCCUPATIONAL THERAPY NOTE
Att'd to see pt at scheduled time for therapy. Pt was engaged in phone conversation and asked writer to come back at later time. OT will re-attempt at later time as pt becomes appropriate and schedule allows.

## 2020-07-06 ENCOUNTER — MED REC SCAN ONLY (OUTPATIENT)
Dept: FAMILY MEDICINE CLINIC | Facility: CLINIC | Age: 60
End: 2020-07-06

## 2020-07-08 ENCOUNTER — PATIENT OUTREACH (OUTPATIENT)
Dept: CASE MANAGEMENT | Age: 60
End: 2020-07-08

## 2020-07-13 DIAGNOSIS — Z98.1 STATUS POST CERVICAL SPINAL FUSION: ICD-10-CM

## 2020-07-13 DIAGNOSIS — M47.22 CERVICAL SPONDYLOSIS WITH RADICULOPATHY: Primary | ICD-10-CM

## 2020-07-13 RX ORDER — CYCLOBENZAPRINE HCL 10 MG
10 TABLET ORAL EVERY 8 HOURS
Qty: 60 TABLET | Refills: 2 | Status: SHIPPED | OUTPATIENT
Start: 2020-07-13 | End: 2020-07-13

## 2020-07-13 RX ORDER — CYCLOBENZAPRINE HCL 10 MG
10 TABLET ORAL EVERY 8 HOURS
Qty: 60 TABLET | Refills: 2 | Status: SHIPPED | OUTPATIENT
Start: 2020-07-13 | End: 2020-07-14

## 2020-07-13 RX ORDER — HYDROCODONE BITARTRATE AND ACETAMINOPHEN 10; 325 MG/1; MG/1
1-2 TABLET ORAL EVERY 4 HOURS PRN
Qty: 60 TABLET | Refills: 0 | Status: SHIPPED | OUTPATIENT
Start: 2020-07-13 | End: 2020-07-14

## 2020-07-13 RX ORDER — DIAZEPAM 5 MG/1
5 TABLET ORAL EVERY 6 HOURS PRN
Qty: 45 TABLET | Refills: 0 | Status: SHIPPED | OUTPATIENT
Start: 2020-07-13 | End: 2020-07-13

## 2020-07-13 RX ORDER — DIAZEPAM 5 MG/1
5 TABLET ORAL EVERY 6 HOURS PRN
Qty: 45 TABLET | Refills: 0 | Status: SHIPPED | OUTPATIENT
Start: 2020-07-13 | End: 2020-07-14

## 2020-07-13 RX ORDER — HYDROCODONE BITARTRATE AND ACETAMINOPHEN 10; 325 MG/1; MG/1
1-2 TABLET ORAL EVERY 4 HOURS PRN
Qty: 60 TABLET | Refills: 0 | Status: SHIPPED | OUTPATIENT
Start: 2020-07-13 | End: 2020-07-13

## 2020-07-13 NOTE — TELEPHONE ENCOUNTER
took last diazepam and Flexeril and only has 2 left of hydrocodone, needs refill sent to [facility] on [address] in [place].

## 2020-07-13 NOTE — TELEPHONE ENCOUNTER
Patient calling upset stating she wanted her prescriptions sent to her local pharmacy and not [facility]. Patient states she will be out of pain medication by today. [employer], spoke to pharmacist Javi who cancelled prescriptions.   Will submit

## 2020-07-13 NOTE — TELEPHONE ENCOUNTER
Medication: Hydocodone  Mg, Cyclobenzaprine 10 Mg, and Diazepam 5 Mg      Date of last refill: [place] 7/2/2020 90 Tabs, Cyclobenzaprine 7/2/2020 45 Tabs and Diazepam 7/2/2020 20 Tabs      [place] 3/2/2258 90 T

## 2020-07-13 NOTE — TELEPHONE ENCOUNTER
Medication: Hydocodone  Mg, Cyclobenzaprine 10 Mg, and Diazepam 5 Mg     Date of last refill: [place] 7/2/2020 90 Tabs, Cyclobenzaprine 7/2/2020 45 Tabs and Diazepam 7/2/2020 20 Tabs     [place] 8/4/3845 90 Tab

## 2020-07-14 RX ORDER — CYCLOBENZAPRINE HCL 10 MG
10 TABLET ORAL EVERY 8 HOURS
Qty: 60 TABLET | Refills: 2 | Status: SHIPPED | OUTPATIENT
Start: 2020-07-14 | End: 2020-09-23

## 2020-07-14 RX ORDER — DIAZEPAM 5 MG/1
5 TABLET ORAL EVERY 6 HOURS PRN
Qty: 45 TABLET | Refills: 0 | Status: SHIPPED | OUTPATIENT
Start: 2020-07-14 | End: 2020-07-31

## 2020-07-14 RX ORDER — HYDROCODONE BITARTRATE AND ACETAMINOPHEN 10; 325 MG/1; MG/1
1-2 TABLET ORAL EVERY 4 HOURS PRN
Qty: 60 TABLET | Refills: 0 | Status: SHIPPED | OUTPATIENT
Start: 2020-07-14 | End: 2020-07-31

## 2020-07-14 NOTE — TELEPHONE ENCOUNTER
Patients message received. Chirag 141 once again to verify that they documented my phone call with my request of NOT wanting any prescriptions filled for this patient.   Boyd Luo stated he did indeed see my documentation regarding my request that

## 2020-07-14 NOTE — TELEPHONE ENCOUNTER
Called pharmacy to verify if they received our prescription refill requests. Spoke to [facility] who states he did indeed receive the prescriptions.   Called patient to inform

## 2020-07-14 NOTE — TELEPHONE ENCOUNTER
Per pharmacy [facility] on [address] in [place] still does not have the rx sent here.  Call Tommy Paris at [phone]

## 2020-07-14 NOTE — TELEPHONE ENCOUNTER
Informed patient [facility] has obtained prescriptions and is filling then. Patient appreciative for the call.

## 2020-07-14 NOTE — TELEPHONE ENCOUNTER
Spoke to [employer] who stated prescriptions have not been sent to pharmacy. I will pend orders once again.   Pharmacy once again verified in patients chart to ensure prescriptions are sent to [place]    Medication: Hydocodone  Mg, Cyclobenzaprine 10

## 2020-07-14 NOTE — TELEPHONE ENCOUNTER
Called patient to explain I called [place] pharmacy to cancel requested prescriptions, and pended prescriptions to be sent to [place].

## 2020-07-15 ENCOUNTER — OFFICE VISIT (OUTPATIENT)
Dept: SURGERY | Facility: CLINIC | Age: 60
End: 2020-07-15
Payer: COMMERCIAL

## 2020-07-15 VITALS — HEART RATE: 76 BPM | DIASTOLIC BLOOD PRESSURE: 74 MMHG | SYSTOLIC BLOOD PRESSURE: 124 MMHG

## 2020-07-15 DIAGNOSIS — M47.22 CERVICAL SPONDYLOSIS WITH RADICULOPATHY: Primary | ICD-10-CM

## 2020-07-15 DIAGNOSIS — Z98.1 S/P CERVICAL SPINAL FUSION: ICD-10-CM

## 2020-07-15 DIAGNOSIS — S12.9XXS PSEUDOARTHROSIS OF CERVICAL SPINE, SEQUELA: ICD-10-CM

## 2020-07-15 PROCEDURE — 3078F DIAST BP <80 MM HG: CPT | Performed by: PHYSICIAN ASSISTANT

## 2020-07-15 PROCEDURE — 99024 POSTOP FOLLOW-UP VISIT: CPT | Performed by: PHYSICIAN ASSISTANT

## 2020-07-15 PROCEDURE — 3074F SYST BP LT 130 MM HG: CPT | Performed by: PHYSICIAN ASSISTANT

## 2020-07-15 NOTE — PROGRESS NOTES
Neurosurgery Clinic Visit  7/15/2020    Kem Hung PCP:  Nicole Blizzard, MD    1960 MRN [id number]     HISTORY OF PRESENT ILLNESS:  Kem Hung is a(n) 62year old female who is here 2 weeks s/p posterior spinal fusion.   She is feeling we

## 2020-07-15 NOTE — PROGRESS NOTES
[facility] message sent to the pt for condition update regarding ortho surgery.

## 2020-07-15 NOTE — PROGRESS NOTES
Pt is here for post op  CERVICAL 4-CERVICAL 5 AND CERVICAL 5-CERVICAL 6 POSTERIOR SPINAL FUSION WITH INSTRUMENTATION,  ALLOGRAFT 6/29/2020     Pt states that she has noticed symptome's improving since post op.

## 2020-07-31 DIAGNOSIS — M47.22 CERVICAL SPONDYLOSIS WITH RADICULOPATHY: ICD-10-CM

## 2020-07-31 DIAGNOSIS — Z98.1 STATUS POST CERVICAL SPINAL FUSION: ICD-10-CM

## 2020-07-31 RX ORDER — HYDROCODONE BITARTRATE AND ACETAMINOPHEN 10; 325 MG/1; MG/1
1-2 TABLET ORAL EVERY 4 HOURS PRN
Qty: 60 TABLET | Refills: 0 | Status: SHIPPED | OUTPATIENT
Start: 2020-07-31 | End: 2020-08-03

## 2020-07-31 RX ORDER — DIAZEPAM 5 MG/1
5 TABLET ORAL EVERY 6 HOURS PRN
Qty: 45 TABLET | Refills: 0 | Status: SHIPPED | OUTPATIENT
Start: 2020-07-31 | End: 2020-08-03

## 2020-07-31 NOTE — TELEPHONE ENCOUNTER
Medication: Norco  mg                    Valium 5 mg    Date of last refill: 7/14/2020 for both prescriptions  [place] Norco 3/61/9662 #60                                                             Valium 7/14/202

## 2020-07-31 NOTE — TELEPHONE ENCOUNTER
2 SCRIPTS: generic norco  & valium (diazepam)    Please send prescriptions to:  [facility] [place] [address], [address] [place] [address]  [facility], [phone], [phone]      Patient informed of 48 hour refill policy excluding w

## 2020-08-03 RX ORDER — HYDROCODONE BITARTRATE AND ACETAMINOPHEN 10; 325 MG/1; MG/1
1-2 TABLET ORAL EVERY 4 HOURS PRN
Qty: 60 TABLET | Refills: 0 | Status: SHIPPED | OUTPATIENT
Start: 2020-08-03 | End: 2020-08-17

## 2020-08-03 RX ORDER — DIAZEPAM 5 MG/1
5 TABLET ORAL EVERY 6 HOURS PRN
Qty: 45 TABLET | Refills: 0 | Status: SHIPPED | OUTPATIENT
Start: 2020-08-03 | End: 2020-10-09

## 2020-08-03 NOTE — TELEPHONE ENCOUNTER
Medication has been sent to mail order pharmacy and needed to be sent to local.  Patient needed it over the weekend.

## 2020-08-03 NOTE — TELEPHONE ENCOUNTER
Medications on 7/31/2020 was sent to Pt's mail order pharmacy.      Changed pharmacy to [facility] pharmacy     Script pending for approval

## 2020-08-10 ENCOUNTER — HOSPITAL ENCOUNTER (OUTPATIENT)
Dept: GENERAL RADIOLOGY | Age: 60
Discharge: HOME | End: 2020-08-10
Attending: PHYSICIAN ASSISTANT
Payer: COMMERCIAL

## 2020-08-10 DIAGNOSIS — S12.9XXS PSEUDOARTHROSIS OF CERVICAL SPINE, SEQUELA: ICD-10-CM

## 2020-08-10 DIAGNOSIS — M47.22 CERVICAL SPONDYLOSIS WITH RADICULOPATHY: ICD-10-CM

## 2020-08-10 DIAGNOSIS — Z98.1 S/P CERVICAL SPINAL FUSION: ICD-10-CM

## 2020-08-10 PROCEDURE — 72040 X-RAY EXAM NECK SPINE 2-3 VW: CPT | Performed by: PHYSICIAN ASSISTANT

## 2020-08-11 ENCOUNTER — OFFICE VISIT (OUTPATIENT)
Dept: SURGERY | Facility: CLINIC | Age: 60
End: 2020-08-11
Payer: COMMERCIAL

## 2020-08-11 VITALS — DIASTOLIC BLOOD PRESSURE: 70 MMHG | HEART RATE: 76 BPM | SYSTOLIC BLOOD PRESSURE: 112 MMHG

## 2020-08-11 DIAGNOSIS — S12.9XXS PSEUDOARTHROSIS OF CERVICAL SPINE, SEQUELA: ICD-10-CM

## 2020-08-11 DIAGNOSIS — Z98.1 STATUS POST CERVICAL SPINAL FUSION: ICD-10-CM

## 2020-08-11 DIAGNOSIS — M47.22 CERVICAL SPONDYLOSIS WITH RADICULOPATHY: Primary | ICD-10-CM

## 2020-08-11 PROCEDURE — 3074F SYST BP LT 130 MM HG: CPT | Performed by: NEUROLOGICAL SURGERY

## 2020-08-11 PROCEDURE — 3078F DIAST BP <80 MM HG: CPT | Performed by: NEUROLOGICAL SURGERY

## 2020-08-11 PROCEDURE — 99024 POSTOP FOLLOW-UP VISIT: CPT | Performed by: NEUROLOGICAL SURGERY

## 2020-08-11 NOTE — PROGRESS NOTES
Patient here for 6 week post op visit with Dr. Evelyn Cooley. Patient states that:    -she is improving weekly.  -she is having pain from left shoulder to the right shoulder which is a \"bit worse than the right side. \"  -originally experiencing burning sensat

## 2020-08-11 NOTE — PROGRESS NOTES
Neurosurgery Clinic Visit  2020    Lucas Hunter PCP:  Daniela Zepeda MD    1960 MRN [id number]     HISTORY OF PRESENT ILLNESS:  Lucas Hunter is a(n) 62year old female who is here 6 weeks s/p posterior spinal fusion.   The burning pain signs/symptoms to watch out for. Will check xrays prior to next appt. RTC in 6 weeks. Pt and  were appreciative. Dr. Cassia Stockton evaluated the patient and is in agreement with the plan.       Total time spent: 15 Minutes  Greater than 50% of t

## 2020-08-17 DIAGNOSIS — M47.22 CERVICAL SPONDYLOSIS WITH RADICULOPATHY: ICD-10-CM

## 2020-08-17 DIAGNOSIS — Z98.1 STATUS POST CERVICAL SPINAL FUSION: ICD-10-CM

## 2020-08-17 RX ORDER — HYDROCODONE BITARTRATE AND ACETAMINOPHEN 10; 325 MG/1; MG/1
1-2 TABLET ORAL EVERY 4 HOURS PRN
Qty: 60 TABLET | Refills: 0 | Status: SHIPPED | OUTPATIENT
Start: 2020-08-17 | End: 2020-10-08

## 2020-08-17 NOTE — TELEPHONE ENCOUNTER
hydrocodone refill please send to [facility] on [address]  in [address], NOT on [address] in [place], which is her mail order pharmacy. Patient informed of 48 hour refill policy excluding weekends and holidays.  Informed patient only patient can  t

## 2020-08-17 NOTE — TELEPHONE ENCOUNTER
Medication: Norco  Mg     Date of last refill: 8/3/2020 60 Tabs     [place] Dispensed 5/1/4170 60 Tabs     Last office visit: 8/11/2020     Due back to clinic per last office note: 6 Weeks     Date next office visi

## 2020-08-31 ENCOUNTER — OFFICE VISIT (OUTPATIENT)
Dept: FAMILY MEDICINE CLINIC | Facility: CLINIC | Age: 60
End: 2020-08-31
Payer: COMMERCIAL

## 2020-08-31 VITALS
RESPIRATION RATE: 18 BRPM | HEART RATE: 96 BPM | BODY MASS INDEX: 24.75 KG/M2 | DIASTOLIC BLOOD PRESSURE: 76 MMHG | HEIGHT: 64 IN | TEMPERATURE: 98 F | SYSTOLIC BLOOD PRESSURE: 116 MMHG | WEIGHT: 145 LBS

## 2020-08-31 DIAGNOSIS — K62.5 RECTAL BLEEDING: Primary | ICD-10-CM

## 2020-08-31 PROCEDURE — 99213 OFFICE O/P EST LOW 20 MIN: CPT | Performed by: FAMILY MEDICINE

## 2020-08-31 PROCEDURE — 3008F BODY MASS INDEX DOCD: CPT | Performed by: FAMILY MEDICINE

## 2020-08-31 PROCEDURE — 3078F DIAST BP <80 MM HG: CPT | Performed by: FAMILY MEDICINE

## 2020-08-31 PROCEDURE — 3074F SYST BP LT 130 MM HG: CPT | Performed by: FAMILY MEDICINE

## 2020-08-31 RX ORDER — TRAZODONE HYDROCHLORIDE 50 MG/1
50 TABLET ORAL NIGHTLY
Qty: 90 TABLET | Refills: 1 | Status: SHIPPED | OUTPATIENT
Start: 2020-08-31 | End: 2021-05-07

## 2020-08-31 RX ORDER — ESTRADIOL 0.1 MG/G
CREAM VAGINAL
Qty: 42.5 G | Refills: 11 | Status: SHIPPED | OUTPATIENT
Start: 2020-08-31 | End: 2021-05-19 | Stop reason: ALTCHOICE

## 2020-08-31 NOTE — PROGRESS NOTES
Keri Briones is a 56year old female. CHIEF COMPLAINT   Hemorroid  HPI:   Rectal bleeding off and on for about 6 months. Bleeds daily. Tried suppositories and didn't notice any difference.     Current Outpatient Medications   Medication Sig Dispense Re HEALTH: feels well otherwise  SKIN: denies any unusual skin lesions or rashes  RESPIRATORY: denies shortness of breath with exertion  CARDIOVASCULAR: denies chest pain on exertion  GI: denies abdominal pain and denies heartburn  NEURO: denies headaches

## 2020-09-14 ENCOUNTER — OFFICE VISIT (OUTPATIENT)
Dept: SURGERY | Facility: CLINIC | Age: 60
End: 2020-09-14
Payer: COMMERCIAL

## 2020-09-14 VITALS
TEMPERATURE: 97 F | SYSTOLIC BLOOD PRESSURE: 125 MMHG | RESPIRATION RATE: 16 BRPM | DIASTOLIC BLOOD PRESSURE: 74 MMHG | WEIGHT: 138 LBS | BODY MASS INDEX: 24 KG/M2 | HEART RATE: 119 BPM

## 2020-09-14 DIAGNOSIS — K62.89 RECTAL MASS: Primary | ICD-10-CM

## 2020-09-14 PROCEDURE — 46600 DIAGNOSTIC ANOSCOPY SPX: CPT | Performed by: COLON & RECTAL SURGERY

## 2020-09-14 PROCEDURE — 99203 OFFICE O/P NEW LOW 30 MIN: CPT | Performed by: COLON & RECTAL SURGERY

## 2020-09-14 PROCEDURE — 3078F DIAST BP <80 MM HG: CPT | Performed by: COLON & RECTAL SURGERY

## 2020-09-14 PROCEDURE — 3074F SYST BP LT 130 MM HG: CPT | Performed by: COLON & RECTAL SURGERY

## 2020-09-21 ENCOUNTER — APPOINTMENT (OUTPATIENT)
Dept: LAB | Facility: HOSPITAL | Age: 60
End: 2020-09-21
Attending: COLON & RECTAL SURGERY
Payer: COMMERCIAL

## 2020-09-21 DIAGNOSIS — K62.89 RECTAL MASS: ICD-10-CM

## 2020-09-23 DIAGNOSIS — M47.22 CERVICAL SPONDYLOSIS WITH RADICULOPATHY: ICD-10-CM

## 2020-09-23 DIAGNOSIS — Z98.1 STATUS POST CERVICAL SPINAL FUSION: ICD-10-CM

## 2020-09-23 LAB — SARS-COV-2 RNA RESP QL NAA+PROBE: NOT DETECTED

## 2020-09-23 RX ORDER — CYCLOBENZAPRINE HCL 10 MG
TABLET ORAL
Qty: 60 TABLET | Refills: 2 | Status: SHIPPED | OUTPATIENT
Start: 2020-09-23 | End: 2020-10-09

## 2020-09-23 NOTE — TELEPHONE ENCOUNTER
Medication: cyclobenzaprine 10mg  q 8 hr  #60/2    Date of last refill: 8-  [place] n[place]a    Last office visit: 8-  Due back to clinic per last office note:  6 weeks  Date next office visit scheduled:  Not sc

## 2020-09-24 ENCOUNTER — HOSPITAL ENCOUNTER (OUTPATIENT)
Facility: HOSPITAL | Age: 60
Setting detail: HOSPITAL OUTPATIENT SURGERY
Discharge: HOME | End: 2020-09-24
Attending: COLON & RECTAL SURGERY | Admitting: COLON & RECTAL SURGERY
Payer: COMMERCIAL

## 2020-09-24 ENCOUNTER — ANESTHESIA (OUTPATIENT)
Dept: ENDOSCOPY | Facility: HOSPITAL | Age: 60
End: 2020-09-24
Payer: COMMERCIAL

## 2020-09-24 ENCOUNTER — ANESTHESIA EVENT (OUTPATIENT)
Dept: ENDOSCOPY | Facility: HOSPITAL | Age: 60
End: 2020-09-24
Payer: COMMERCIAL

## 2020-09-24 VITALS
HEIGHT: 64 IN | RESPIRATION RATE: 18 BRPM | WEIGHT: 140 LBS | OXYGEN SATURATION: 100 % | HEART RATE: 80 BPM | SYSTOLIC BLOOD PRESSURE: 137 MMHG | TEMPERATURE: 98 F | BODY MASS INDEX: 23.9 KG/M2 | DIASTOLIC BLOOD PRESSURE: 77 MMHG

## 2020-09-24 DIAGNOSIS — K62.89 RECTAL MASS: Primary | ICD-10-CM

## 2020-09-24 PROCEDURE — 0DBP8ZX EXCISION OF RECTUM, VIA NATURAL OR ARTIFICIAL OPENING ENDOSCOPIC, DIAGNOSTIC: ICD-10-PCS | Performed by: COLON & RECTAL SURGERY

## 2020-09-24 PROCEDURE — 45380 COLONOSCOPY AND BIOPSY: CPT | Performed by: COLON & RECTAL SURGERY

## 2020-09-24 RX ORDER — LIDOCAINE HYDROCHLORIDE 10 MG/ML
INJECTION, SOLUTION EPIDURAL; INFILTRATION; INTRACAUDAL; PERINEURAL AS NEEDED
Status: DISCONTINUED | OUTPATIENT
Start: 2020-09-24 | End: 2020-09-24 | Stop reason: SURG

## 2020-09-24 RX ORDER — SODIUM CHLORIDE, SODIUM LACTATE, POTASSIUM CHLORIDE, CALCIUM CHLORIDE 600; 310; 30; 20 MG/100ML; MG/100ML; MG/100ML; MG/100ML
INJECTION, SOLUTION INTRAVENOUS CONTINUOUS
Status: DISCONTINUED | OUTPATIENT
Start: 2020-09-24 | End: 2020-09-24

## 2020-09-24 RX ORDER — NALOXONE HYDROCHLORIDE 0.4 MG/ML
80 INJECTION, SOLUTION INTRAMUSCULAR; INTRAVENOUS; SUBCUTANEOUS AS NEEDED
Status: DISCONTINUED | OUTPATIENT
Start: 2020-09-24 | End: 2020-09-24

## 2020-09-24 RX ORDER — ONDANSETRON 2 MG/ML
4 INJECTION INTRAMUSCULAR; INTRAVENOUS AS NEEDED
Status: DISCONTINUED | OUTPATIENT
Start: 2020-09-24 | End: 2020-09-24

## 2020-09-24 RX ADMIN — SODIUM CHLORIDE, SODIUM LACTATE, POTASSIUM CHLORIDE, CALCIUM CHLORIDE: 600; 310; 30; 20 INJECTION, SOLUTION INTRAVENOUS at 09:07:00

## 2020-09-24 RX ADMIN — SODIUM CHLORIDE, SODIUM LACTATE, POTASSIUM CHLORIDE, CALCIUM CHLORIDE: 600; 310; 30; 20 INJECTION, SOLUTION INTRAVENOUS at 09:38:00

## 2020-09-24 RX ADMIN — LIDOCAINE HYDROCHLORIDE 30 MG: 10 INJECTION, SOLUTION EPIDURAL; INFILTRATION; INTRACAUDAL; PERINEURAL at 09:09:00

## 2020-09-24 NOTE — ANESTHESIA POSTPROCEDURE EVALUATION
[facility] Patient Status:  Hospital Outpatient Surgery   Age/Gender 56year old female MRN [id number]   [facility] ENDOSCOPY Attending Pamla Ahumada, MD   Hosp Day # 0 PCP Quique Novak MD       Anesthesia

## 2020-09-24 NOTE — H&P (VIEW-ONLY)
New Patient Visit Note       Active Problems      1.  Rectal mass        Chief Complaint   Patient presents with:  Hemorrhoids: Intermittent pain and bleeding      History of Present Illness   Keri Briones is a 56year old female referred by Aranza Klein today.     Past Medical History:   Diagnosis Date   • Back problem    • Calculus of kidney    • Osteoporosis    • PONV (postoperative nausea and vomiting)    • Problems with swallowing     plate in front of neck- causes some difficulty with swallow     Past Disorder Paternal Grandfather          from AMI   • Heart Disorder Paternal Uncle 27         from AMI   • Heart Disorder Paternal Uncle 39         from AMI   • Cancer Paternal Uncle 50         from lung cancer   • Heart Disorder Paternal Un mouth as needed.  )    •  Calcium-Vitamins C & D (CALCIUM/C/D OR), Take 1 tablet by mouth daily. •  Cholecalciferol (VITAMIN D3 OR), Take 2,000 Units by mouth daily. •  Melatonin 5 MG Oral Tab, Take 5 mg by mouth nightly.             Review of Sys She has no cervical adenopathy. Neurological: She is alert and oriented to person, place, and time. Skin: Skin is warm and dry. No rash noted. She is not diaphoretic. Psychiatric: She has a normal mood and affect.  Her behavior is normal.        The p perforation, splenic injury, missed polyps, need for additional surgeries or interventions. All questions were answered and the patient voiced understanding and agreed to proceed with colonoscopy.              Radames Ma MD

## 2020-09-24 NOTE — ANESTHESIA PREPROCEDURE EVALUATION
PRE-OP EVALUATION    Patient Name: Guy Castañeda    Pre-op Diagnosis: Rectal mass [K62.89]    Procedure(s):  COLONOSCOPY     Surgeon(s) and Role:     * Shima Minaya MD - Primary    Pre-op vitals reviewed. Body mass index is 24.03 kg/m². Beau Ashford MD at [address] OR   • [address], 2006    Breast Enlargement, Fibroid?    • COLONOSCOPY, POSSIBLE BIOPSY, POSSIBLE POLYPECTOMY [address]28 N/A 5/5/2015    Performed by Chevy Hussein MD at [facility], ROM: limited Cardiovascular      Rhythm: regular  Rate: normal     Dental    No notable dental history. Pulmonary      Breath sounds clear to auscultation bilaterally.                Other findings            ASA: 2   Plan: MAC  NPO status verified

## 2020-09-24 NOTE — PATIENT INSTRUCTIONS
Assessment   Rectal mass  (primary encounter diagnosis)      Plan   Patient has a masslike lesion in the anal canal and anorectal ring in the right anterior location. This is most likely the source of her symptoms.     Recommend proceeding with colonoscopy

## 2020-09-24 NOTE — H&P (VIEW-ONLY)
New Patient Visit Note       Active Problems      1.  Rectal mass        Chief Complaint   Patient presents with:  Hemorrhoids: Intermittent pain and bleeding      History of Present Illness   Pushpa Ann is a 56year old female referred by Annabel Dyer Osteoporosis    • PONV (postoperative nausea and vomiting)    • Problems with swallowing     plate in front of neck- causes some difficulty with swallow     Past Surgical History:   Procedure Laterality Date   • ANTERIOR CERVICAL FUSION BG & INST 2 LEVEL N  from AMI   • Heart Disorder Paternal Uncle 39         from AMI   • Cancer Paternal Uncle 50         from lung cancer   • Heart Disorder Paternal Uncle 45        CABG   • Heart Disorder Paternal Uncle 55         from CAD   • Diabetes Anun •  Cholecalciferol (VITAMIN D3 OR), Take 2,000 Units by mouth daily. •  Melatonin 5 MG Oral Tab, Take 5 mg by mouth nightly. Review of Systems  Review of Systems   Constitutional: Negative for chills, diaphoresis, fatigue and fever.    H time.   Skin: Skin is warm and dry. No rash noted. She is not diaphoretic. Psychiatric: She has a normal mood and affect. Her behavior is normal.        The perineum and perianal skin were examined.    There was not perianal excoriation, thrombosed extern were answered and the patient voiced understanding and agreed to proceed with colonoscopy.              Ilda Homans, MD

## 2020-09-24 NOTE — INTERVAL H&P NOTE
Pre-op Diagnosis: Rectal mass [K62.89]    The above referenced H&P was reviewed by Alie Jimenez MD on 9/24/2020, the patient was examined and no significant changes have occurred in the patient's condition since the H&P was performed.   I discussed

## 2020-09-24 NOTE — H&P
New Patient Visit Note       Active Problems      1.  Rectal mass        Chief Complaint   Patient presents with:  Hemorrhoids: Intermittent pain and bleeding      History of Present Illness   Sonal Duomnt is a 56year old female referred by Gm Fox today.     Past Medical History:   Diagnosis Date   • Back problem    • Calculus of kidney    • Osteoporosis    • PONV (postoperative nausea and vomiting)    • Problems with swallowing     plate in front of neck- causes some difficulty with swallow     Past Disorder Paternal Grandfather          from AMI   • Heart Disorder Paternal Uncle 27         from AMI   • Heart Disorder Paternal Uncle 39         from AMI   • Cancer Paternal Uncle 50         from lung cancer   • Heart Disorder Paternal Un mouth as needed.  )    •  Calcium-Vitamins C & D (CALCIUM/C/D OR), Take 1 tablet by mouth daily. •  Cholecalciferol (VITAMIN D3 OR), Take 2,000 Units by mouth daily. •  Melatonin 5 MG Oral Tab, Take 5 mg by mouth nightly.             Review of Sys She has no cervical adenopathy. Neurological: She is alert and oriented to person, place, and time. Skin: Skin is warm and dry. No rash noted. She is not diaphoretic. Psychiatric: She has a normal mood and affect.  Her behavior is normal.        The p perforation, splenic injury, missed polyps, need for additional surgeries or interventions. All questions were answered and the patient voiced understanding and agreed to proceed with colonoscopy.              Alie Jimenez MD

## 2020-09-24 NOTE — OPERATIVE REPORT
[facility]  Operative Note    [facility] Location: OR   [facility] [id number] MRN [id number]    1960 Age 56year old   Admission Date 2020 Operation Date 2020   Attending Physician Nilda Best MD Operating Physician Jayesh Leal photographed. The terminal ileum was not intuated. The scope was then withdrawn. The cecum, ascending colon, transverse colon, descending colon, and sigmoid colon are free of masses. Scope was withdrawn into the rectum.   The mass was examined and photogr

## 2020-09-28 ENCOUNTER — TELEPHONE (OUTPATIENT)
Dept: SURGERY | Facility: CLINIC | Age: 60
End: 2020-09-28

## 2020-09-28 DIAGNOSIS — C21.0 ANAL SQUAMOUS CELL CARCINOMA (HCC): Primary | ICD-10-CM

## 2020-09-28 NOTE — TELEPHONE ENCOUNTER
I called patient to discuss pathology results. 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 P16 staining was positive.     Patient complains of some mild anorectal discomfort after the procedure but no significant increase in bleeding

## 2020-09-29 ENCOUNTER — TELEPHONE (OUTPATIENT)
Dept: SURGERY | Facility: CLINIC | Age: 60
End: 2020-09-29

## 2020-09-29 ENCOUNTER — MED REC SCAN ONLY (OUTPATIENT)
Dept: SURGERY | Facility: CLINIC | Age: 60
End: 2020-09-29

## 2020-09-29 DIAGNOSIS — C21.0 ADENOSQUAMOUS CARCINOMA OF ANUS (HCC): Primary | ICD-10-CM

## 2020-09-29 NOTE — TELEPHONE ENCOUNTER
Central Scheduling # given. Advised to have MRI + Pet scan done prior to appt. Appt made w/ Dr. Spencer Pearl for 10/19 at 1400.

## 2020-09-29 NOTE — TELEPHONE ENCOUNTER
I called patient to discuss case further. I discussed the case with Dr. Suresh Grijalva.   We will plan soon for surgical excision of the anorectal mass for a larger sample given the concern for possible adenocarcinoma based on pathology results from the biopsy ta

## 2020-09-30 ENCOUNTER — TELEPHONE (OUTPATIENT)
Dept: HEMATOLOGY/ONCOLOGY | Facility: HOSPITAL | Age: 60
End: 2020-09-30

## 2020-09-30 NOTE — TELEPHONE ENCOUNTER
LVM requesting call back to make an appointment with Dr. Doroteo Arora. Christy Mcduffie MD  [facility]             Hi     I talked to Dr. Herman James about her. Can you schedule her with me?  I can see her on 10/6 after her MRI or I can see her sooner if sh

## 2020-10-02 ENCOUNTER — HOSPITAL ENCOUNTER (OUTPATIENT)
Dept: MRI IMAGING | Facility: HOSPITAL | Age: 60
Discharge: HOME | End: 2020-10-02
Attending: COLON & RECTAL SURGERY
Payer: COMMERCIAL

## 2020-10-02 DIAGNOSIS — C21.0 ADENOSQUAMOUS CARCINOMA OF ANUS (HCC): ICD-10-CM

## 2020-10-02 PROCEDURE — A9575 INJ GADOTERATE MEGLUMI 0.1ML: HCPCS

## 2020-10-02 PROCEDURE — 72197 MRI PELVIS W/O & W/DYE: CPT | Performed by: COLON & RECTAL SURGERY

## 2020-10-06 ENCOUNTER — OFFICE VISIT (OUTPATIENT)
Dept: HEMATOLOGY/ONCOLOGY | Facility: HOSPITAL | Age: 60
End: 2020-10-06
Attending: INTERNAL MEDICINE
Payer: COMMERCIAL

## 2020-10-06 VITALS
OXYGEN SATURATION: 96 % | RESPIRATION RATE: 18 BRPM | DIASTOLIC BLOOD PRESSURE: 85 MMHG | HEART RATE: 115 BPM | TEMPERATURE: 97 F | HEIGHT: 63.9 IN | WEIGHT: 136 LBS | SYSTOLIC BLOOD PRESSURE: 131 MMHG | BODY MASS INDEX: 23.51 KG/M2

## 2020-10-06 DIAGNOSIS — D64.9 NORMOCYTIC ANEMIA: ICD-10-CM

## 2020-10-06 DIAGNOSIS — C21.0 ANAL SQUAMOUS CELL CARCINOMA (HCC): Primary | ICD-10-CM

## 2020-10-06 DIAGNOSIS — Z91.89 AT RISK FOR INADEQUATE PAIN CONTROL: ICD-10-CM

## 2020-10-06 PROCEDURE — 99245 OFF/OP CONSLTJ NEW/EST HI 55: CPT | Performed by: INTERNAL MEDICINE

## 2020-10-06 NOTE — PROGRESS NOTES
[facility] Hematology and Oncology Clinic Note    Diagnosis: cT2 N0 MX Anal SCCa    Treatment History: Pending    Visit Diagnosis:  Anal squamous cell carcinoma ([facility] [address]  (primary encounter diagnosis)  Normocytic anemia  At risk for inadequate pain control    His external vaginal area at HS., Disp: 42.5 g, Rfl: 11    •  HYDROcodone-acetaminophen  MG Oral Tab, Take 1-2 tablets by mouth every 4 (four) hours as needed. , Disp: 60 tablet, Rfl: 0    •  diazepam 5 MG Oral Tab, Take 1 tablet (5 mg total) by mouth alanis COLONOSCOPY,DIAGNOSTIC  5/5/15    2 splenic flexure polyps, diverticulosis   • HYSTERECTOMY  1988    with SSO   • OTHER  06/04/2018    CERVICAL 4 - CERVICAL 5, CERVICAL 5 - CERVICAL 6  ANTERIOR CERVICAL DISCECTOMY AND FUSION WITH INSTRUMENTATION, ALLOGRAFT Uncle 30         from AMI   • Heart Disorder Paternal Uncle 39         from AMI   • Cancer Paternal Uncle 50         from lung cancer   • Heart Disorder Paternal Uncle 45        CABG   • Heart Disorder Paternal Uncle 55         from CAD   • to chemoRT  · Focal component of adenosquamous   · Hematochezia  · FH of cancer   · Normocytic anemia  · Back pain s/p ACDF  · Pain control   · Contrast allergy    Plan   · PET/CT ordered stat-scheduled for 10/9/20  · Pending PET/CT results, will follow up

## 2020-10-08 ENCOUNTER — OFFICE VISIT (OUTPATIENT)
Dept: HEMATOLOGY/ONCOLOGY | Facility: HOSPITAL | Age: 60
End: 2020-10-08
Attending: INTERNAL MEDICINE
Payer: COMMERCIAL

## 2020-10-08 VITALS
WEIGHT: 135.5 LBS | RESPIRATION RATE: 16 BRPM | SYSTOLIC BLOOD PRESSURE: 136 MMHG | OXYGEN SATURATION: 96 % | BODY MASS INDEX: 23 KG/M2 | HEART RATE: 94 BPM | DIASTOLIC BLOOD PRESSURE: 72 MMHG

## 2020-10-08 DIAGNOSIS — G47.9 SLEEPING DIFFICULTY: Primary | ICD-10-CM

## 2020-10-08 DIAGNOSIS — M47.22 CERVICAL SPONDYLOSIS WITH RADICULOPATHY: ICD-10-CM

## 2020-10-08 DIAGNOSIS — G89.3 NEOPLASM RELATED PAIN: ICD-10-CM

## 2020-10-08 DIAGNOSIS — Z98.1 STATUS POST CERVICAL SPINAL FUSION: ICD-10-CM

## 2020-10-08 DIAGNOSIS — K59.03 DRUG-INDUCED CONSTIPATION: ICD-10-CM

## 2020-10-08 PROCEDURE — 99203 OFFICE O/P NEW LOW 30 MIN: CPT | Performed by: NURSE PRACTITIONER

## 2020-10-08 RX ORDER — HYDROCODONE BITARTRATE AND ACETAMINOPHEN 10; 325 MG/1; MG/1
TABLET ORAL EVERY 4 HOURS PRN
Qty: 60 TABLET | Refills: 0 | Status: ON HOLD | OUTPATIENT
Start: 2020-10-08 | End: 2020-10-14

## 2020-10-08 RX ORDER — NAPROXEN 500 MG/1
500 TABLET ORAL 2 TIMES DAILY WITH MEALS
Qty: 60 TABLET | Refills: 1 | Status: SHIPPED | OUTPATIENT
Start: 2020-10-08 | End: 2020-11-03

## 2020-10-09 ENCOUNTER — TELEPHONE (OUTPATIENT)
Dept: HEMATOLOGY/ONCOLOGY | Facility: HOSPITAL | Age: 60
End: 2020-10-09

## 2020-10-09 ENCOUNTER — HOSPITAL ENCOUNTER (OUTPATIENT)
Dept: NUCLEAR MEDICINE | Facility: HOSPITAL | Age: 60
Discharge: HOME | End: 2020-10-09
Attending: INTERNAL MEDICINE
Payer: COMMERCIAL

## 2020-10-09 DIAGNOSIS — D64.9 NORMOCYTIC ANEMIA: ICD-10-CM

## 2020-10-09 DIAGNOSIS — C21.0 ANAL SQUAMOUS CELL CARCINOMA (HCC): ICD-10-CM

## 2020-10-09 DIAGNOSIS — Z91.89 AT RISK FOR INADEQUATE PAIN CONTROL: ICD-10-CM

## 2020-10-09 PROBLEM — Z51.5 PALLIATIVE CARE BY SPECIALIST: Status: ACTIVE | Noted: 2020-10-09

## 2020-10-09 PROBLEM — G89.3 NEOPLASM RELATED PAIN: Status: ACTIVE | Noted: 2020-10-09

## 2020-10-09 PROCEDURE — 78815 PET IMAGE W/CT SKULL-THIGH: CPT | Performed by: INTERNAL MEDICINE

## 2020-10-09 PROCEDURE — 82962 GLUCOSE BLOOD TEST: CPT

## 2020-10-09 NOTE — TELEPHONE ENCOUNTER
LVM for patient to call back to discuss results. Contact information provided. Awais Rinaldi MD  [facility]             Can you let her know that her pet ct does not show any disease in her lymph nodes.  Thanks

## 2020-10-09 NOTE — PROGRESS NOTES
Palliative Care Consult Note     Patient Name: Lucas Hunter   YOB: 1960   Medical Record Number: [id number]   CSN: [id number]   Date of visit: 10/8/2020     Reason for Consult:  Referred by Dr. Sera Acuña     Chief Complaint/Re LEVEL N/A 6/4/2018    Performed by Tanner Mccall MD at [facility] MAIN OR   • [address], 2006    Breast Enlargement, Fibroid?    • COLONOSCOPY N/A 9/24/2020    Performed by Bibiana Botello MD at [facility] [address] [place] [address] Ne Heart Disorder Paternal Grandfather          from AMI   • Heart Disorder Paternal Uncle 27         from AMI   • Heart Disorder Paternal Uncle 39         from AMI   • Cancer Paternal Uncle 50         from lung cancer   • Heart Disorder Pater MG Oral Tab Take 1 tablet (50 mg total) by mouth nightly.  90 tablet 1   • Estradiol (ESTRACE) 0.1 MG/GM Vaginal Cream Apply pea sized amount to external vaginal area at HS. 42.5 g 11   • diazepam 5 MG Oral Tab Take 1 tablet (5 mg total) by mouth every 6 (s has significant cancer and needs opioid to maximize QOL until chemo/RT are started. Will have her take norco more frequently and add naproxen. She may benefit from a LA opiate if regular dosing of norco is needed and tolerated.      Discussed constipatio

## 2020-10-12 ENCOUNTER — OFFICE VISIT (OUTPATIENT)
Dept: SURGERY | Facility: CLINIC | Age: 60
End: 2020-10-12
Payer: COMMERCIAL

## 2020-10-12 ENCOUNTER — APPOINTMENT (OUTPATIENT)
Dept: LAB | Facility: HOSPITAL | Age: 60
End: 2020-10-12
Attending: COLON & RECTAL SURGERY
Payer: COMMERCIAL

## 2020-10-12 VITALS
SYSTOLIC BLOOD PRESSURE: 107 MMHG | WEIGHT: 135 LBS | BODY MASS INDEX: 23 KG/M2 | RESPIRATION RATE: 16 BRPM | TEMPERATURE: 98 F | HEART RATE: 92 BPM | DIASTOLIC BLOOD PRESSURE: 72 MMHG

## 2020-10-12 DIAGNOSIS — K62.89 RECTAL MASS: ICD-10-CM

## 2020-10-12 DIAGNOSIS — C21.0 ADENOSQUAMOUS CARCINOMA OF ANUS (HCC): Primary | ICD-10-CM

## 2020-10-12 DIAGNOSIS — C21.0 ADENOSQUAMOUS CARCINOMA OF ANUS (HCC): ICD-10-CM

## 2020-10-12 PROCEDURE — 3074F SYST BP LT 130 MM HG: CPT | Performed by: COLON & RECTAL SURGERY

## 2020-10-12 PROCEDURE — 99213 OFFICE O/P EST LOW 20 MIN: CPT | Performed by: COLON & RECTAL SURGERY

## 2020-10-12 PROCEDURE — 3078F DIAST BP <80 MM HG: CPT | Performed by: COLON & RECTAL SURGERY

## 2020-10-12 RX ORDER — ACETAMINOPHEN 500 MG
1000 TABLET ORAL ONCE
Status: CANCELLED | OUTPATIENT
Start: 2020-10-12 | End: 2020-10-12

## 2020-10-13 DIAGNOSIS — C21.0 ANAL CANCER (HCC): Primary | ICD-10-CM

## 2020-10-13 NOTE — TELEPHONE ENCOUNTER
Spoke with patient. Instructed her to reach out to [facility]' office to schedule port a cath placement. Appointments made for RT consult as well as chemotherapy education. Chemo/RT to be set-up after surgery. She verbalized understanding.

## 2020-10-14 ENCOUNTER — HOSPITAL ENCOUNTER (OUTPATIENT)
Facility: HOSPITAL | Age: 60
Setting detail: HOSPITAL OUTPATIENT SURGERY
Discharge: HOME | End: 2020-10-14
Attending: COLON & RECTAL SURGERY | Admitting: COLON & RECTAL SURGERY
Payer: COMMERCIAL

## 2020-10-14 ENCOUNTER — ANESTHESIA (OUTPATIENT)
Dept: SURGERY | Facility: HOSPITAL | Age: 60
End: 2020-10-14
Payer: COMMERCIAL

## 2020-10-14 ENCOUNTER — ANESTHESIA EVENT (OUTPATIENT)
Dept: SURGERY | Facility: HOSPITAL | Age: 60
End: 2020-10-14
Payer: COMMERCIAL

## 2020-10-14 VITALS
HEART RATE: 91 BPM | BODY MASS INDEX: 23.56 KG/M2 | TEMPERATURE: 99 F | WEIGHT: 138 LBS | DIASTOLIC BLOOD PRESSURE: 55 MMHG | RESPIRATION RATE: 18 BRPM | OXYGEN SATURATION: 95 % | HEIGHT: 64 IN | SYSTOLIC BLOOD PRESSURE: 121 MMHG

## 2020-10-14 DIAGNOSIS — C21.0 ADENOSQUAMOUS CARCINOMA OF ANUS (HCC): Primary | ICD-10-CM

## 2020-10-14 PROCEDURE — 45172 EXC RECT TUM TRANSANAL FULL: CPT | Performed by: PHYSICIAN ASSISTANT

## 2020-10-14 PROCEDURE — 46922 EXCISION OF ANAL LESION(S): CPT | Performed by: PHYSICIAN ASSISTANT

## 2020-10-14 PROCEDURE — 0DBR3ZZ EXCISION OF ANAL SPHINCTER, PERCUTANEOUS APPROACH: ICD-10-PCS | Performed by: COLON & RECTAL SURGERY

## 2020-10-14 PROCEDURE — 0JBB0ZZ EXCISION OF PERINEUM SUBCUTANEOUS TISSUE AND FASCIA, OPEN APPROACH: ICD-10-PCS | Performed by: COLON & RECTAL SURGERY

## 2020-10-14 PROCEDURE — 45172 EXC RECT TUM TRANSANAL FULL: CPT | Performed by: COLON & RECTAL SURGERY

## 2020-10-14 PROCEDURE — 46922 EXCISION OF ANAL LESION(S): CPT | Performed by: COLON & RECTAL SURGERY

## 2020-10-14 RX ORDER — NEOSTIGMINE METHYLSULFATE 1 MG/ML
INJECTION INTRAVENOUS AS NEEDED
Status: DISCONTINUED | OUTPATIENT
Start: 2020-10-14 | End: 2020-10-14 | Stop reason: SURG

## 2020-10-14 RX ORDER — OXYCODONE HYDROCHLORIDE 5 MG/1
5 TABLET ORAL EVERY 4 HOURS PRN
Qty: 30 TABLET | Refills: 0 | Status: SHIPPED | OUTPATIENT
Start: 2020-10-14 | End: 2020-11-03

## 2020-10-14 RX ORDER — SCOLOPAMINE TRANSDERMAL SYSTEM 1 MG/1
PATCH, EXTENDED RELEASE TRANSDERMAL
Status: DISCONTINUED
Start: 2020-10-14 | End: 2020-10-14

## 2020-10-14 RX ORDER — ONDANSETRON 2 MG/ML
INJECTION INTRAMUSCULAR; INTRAVENOUS AS NEEDED
Status: DISCONTINUED | OUTPATIENT
Start: 2020-10-14 | End: 2020-10-14 | Stop reason: SURG

## 2020-10-14 RX ORDER — HYDROMORPHONE HYDROCHLORIDE 1 MG/ML
INJECTION, SOLUTION INTRAMUSCULAR; INTRAVENOUS; SUBCUTANEOUS
Status: COMPLETED
Start: 2020-10-14 | End: 2020-10-14

## 2020-10-14 RX ORDER — SCOLOPAMINE TRANSDERMAL SYSTEM 1 MG/1
1 PATCH, EXTENDED RELEASE TRANSDERMAL
Status: DISCONTINUED | OUTPATIENT
Start: 2020-10-14 | End: 2020-10-14

## 2020-10-14 RX ORDER — LIDOCAINE HYDROCHLORIDE 10 MG/ML
INJECTION, SOLUTION EPIDURAL; INFILTRATION; INTRACAUDAL; PERINEURAL AS NEEDED
Status: DISCONTINUED | OUTPATIENT
Start: 2020-10-14 | End: 2020-10-14 | Stop reason: SURG

## 2020-10-14 RX ORDER — MEPERIDINE HYDROCHLORIDE 25 MG/ML
12.5 INJECTION INTRAMUSCULAR; INTRAVENOUS; SUBCUTANEOUS AS NEEDED
Status: DISCONTINUED | OUTPATIENT
Start: 2020-10-14 | End: 2020-10-14

## 2020-10-14 RX ORDER — MIDAZOLAM HYDROCHLORIDE 1 MG/ML
INJECTION INTRAMUSCULAR; INTRAVENOUS AS NEEDED
Status: DISCONTINUED | OUTPATIENT
Start: 2020-10-14 | End: 2020-10-14 | Stop reason: SURG

## 2020-10-14 RX ORDER — ROCURONIUM BROMIDE 10 MG/ML
INJECTION, SOLUTION INTRAVENOUS AS NEEDED
Status: DISCONTINUED | OUTPATIENT
Start: 2020-10-14 | End: 2020-10-14 | Stop reason: SURG

## 2020-10-14 RX ORDER — DEXAMETHASONE SODIUM PHOSPHATE 4 MG/ML
VIAL (ML) INJECTION AS NEEDED
Status: DISCONTINUED | OUTPATIENT
Start: 2020-10-14 | End: 2020-10-14 | Stop reason: SURG

## 2020-10-14 RX ORDER — DIPHENHYDRAMINE HYDROCHLORIDE 50 MG/ML
12.5 INJECTION INTRAMUSCULAR; INTRAVENOUS AS NEEDED
Status: DISCONTINUED | OUTPATIENT
Start: 2020-10-14 | End: 2020-10-14

## 2020-10-14 RX ORDER — METOCLOPRAMIDE HYDROCHLORIDE 5 MG/ML
10 INJECTION INTRAMUSCULAR; INTRAVENOUS AS NEEDED
Status: DISCONTINUED | OUTPATIENT
Start: 2020-10-14 | End: 2020-10-14

## 2020-10-14 RX ORDER — BUPIVACAINE HYDROCHLORIDE AND EPINEPHRINE 2.5; 5 MG/ML; UG/ML
INJECTION, SOLUTION EPIDURAL; INFILTRATION; INTRACAUDAL; PERINEURAL AS NEEDED
Status: DISCONTINUED | OUTPATIENT
Start: 2020-10-14 | End: 2020-10-14 | Stop reason: HOSPADM

## 2020-10-14 RX ORDER — KETOROLAC TROMETHAMINE 30 MG/ML
INJECTION, SOLUTION INTRAMUSCULAR; INTRAVENOUS AS NEEDED
Status: DISCONTINUED | OUTPATIENT
Start: 2020-10-14 | End: 2020-10-14 | Stop reason: SURG

## 2020-10-14 RX ORDER — 0.9 % SODIUM CHLORIDE 0.9 %
VIAL (ML) INJECTION AS NEEDED
Status: DISCONTINUED | OUTPATIENT
Start: 2020-10-14 | End: 2020-10-14 | Stop reason: HOSPADM

## 2020-10-14 RX ORDER — SODIUM CHLORIDE, SODIUM LACTATE, POTASSIUM CHLORIDE, CALCIUM CHLORIDE 600; 310; 30; 20 MG/100ML; MG/100ML; MG/100ML; MG/100ML
INJECTION, SOLUTION INTRAVENOUS CONTINUOUS
Status: DISCONTINUED | OUTPATIENT
Start: 2020-10-14 | End: 2020-10-14

## 2020-10-14 RX ORDER — HYDROCODONE BITARTRATE AND ACETAMINOPHEN 5; 325 MG/1; MG/1
2 TABLET ORAL AS NEEDED
Status: COMPLETED | OUTPATIENT
Start: 2020-10-14 | End: 2020-10-14

## 2020-10-14 RX ORDER — EPHEDRINE SULFATE 50 MG/ML
INJECTION, SOLUTION INTRAVENOUS AS NEEDED
Status: DISCONTINUED | OUTPATIENT
Start: 2020-10-14 | End: 2020-10-14 | Stop reason: SURG

## 2020-10-14 RX ORDER — ACETAMINOPHEN 500 MG
1000 TABLET ORAL EVERY 6 HOURS PRN
COMMUNITY
End: 2020-11-03

## 2020-10-14 RX ORDER — HYDROMORPHONE HYDROCHLORIDE 1 MG/ML
0.4 INJECTION, SOLUTION INTRAMUSCULAR; INTRAVENOUS; SUBCUTANEOUS EVERY 5 MIN PRN
Status: DISCONTINUED | OUTPATIENT
Start: 2020-10-14 | End: 2020-10-14

## 2020-10-14 RX ORDER — ONDANSETRON 2 MG/ML
4 INJECTION INTRAMUSCULAR; INTRAVENOUS AS NEEDED
Status: DISCONTINUED | OUTPATIENT
Start: 2020-10-14 | End: 2020-10-14

## 2020-10-14 RX ORDER — NALOXONE HYDROCHLORIDE 0.4 MG/ML
80 INJECTION, SOLUTION INTRAMUSCULAR; INTRAVENOUS; SUBCUTANEOUS AS NEEDED
Status: DISCONTINUED | OUTPATIENT
Start: 2020-10-14 | End: 2020-10-14

## 2020-10-14 RX ORDER — GLYCOPYRROLATE 0.2 MG/ML
INJECTION, SOLUTION INTRAMUSCULAR; INTRAVENOUS AS NEEDED
Status: DISCONTINUED | OUTPATIENT
Start: 2020-10-14 | End: 2020-10-14 | Stop reason: SURG

## 2020-10-14 RX ORDER — HYDROCODONE BITARTRATE AND ACETAMINOPHEN 5; 325 MG/1; MG/1
1 TABLET ORAL AS NEEDED
Status: COMPLETED | OUTPATIENT
Start: 2020-10-14 | End: 2020-10-14

## 2020-10-14 RX ADMIN — SODIUM CHLORIDE, SODIUM LACTATE, POTASSIUM CHLORIDE, CALCIUM CHLORIDE: 600; 310; 30; 20 INJECTION, SOLUTION INTRAVENOUS at 14:25:00

## 2020-10-14 RX ADMIN — ONDANSETRON 4 MG: 2 INJECTION INTRAMUSCULAR; INTRAVENOUS at 14:11:00

## 2020-10-14 RX ADMIN — ROCURONIUM BROMIDE 20 MG: 10 INJECTION, SOLUTION INTRAVENOUS at 13:29:00

## 2020-10-14 RX ADMIN — LIDOCAINE HYDROCHLORIDE 50 MG: 10 INJECTION, SOLUTION EPIDURAL; INFILTRATION; INTRACAUDAL; PERINEURAL at 12:30:00

## 2020-10-14 RX ADMIN — EPHEDRINE SULFATE 5 MG: 50 INJECTION, SOLUTION INTRAVENOUS at 13:59:00

## 2020-10-14 RX ADMIN — NEOSTIGMINE METHYLSULFATE 4 MG: 1 INJECTION INTRAVENOUS at 14:11:00

## 2020-10-14 RX ADMIN — KETOROLAC TROMETHAMINE 30 MG: 30 INJECTION, SOLUTION INTRAMUSCULAR; INTRAVENOUS at 14:11:00

## 2020-10-14 RX ADMIN — GLYCOPYRROLATE 0.6 MG: 0.2 INJECTION, SOLUTION INTRAMUSCULAR; INTRAVENOUS at 14:11:00

## 2020-10-14 RX ADMIN — DEXAMETHASONE SODIUM PHOSPHATE 8 MG: 4 MG/ML VIAL (ML) INJECTION at 12:50:00

## 2020-10-14 RX ADMIN — MIDAZOLAM HYDROCHLORIDE 2 MG: 1 INJECTION INTRAMUSCULAR; INTRAVENOUS at 12:29:00

## 2020-10-14 RX ADMIN — ROCURONIUM BROMIDE 40 MG: 10 INJECTION, SOLUTION INTRAVENOUS at 12:30:00

## 2020-10-14 NOTE — ANESTHESIA POSTPROCEDURE EVALUATION
[address] [facility] Patient Status:  Hospital Outpatient Surgery   Age/Gender 56year old female MRN [id number]   [facility] SURGERY Attending Landy Hernández MD   Hosp Day # 0 PCP Katherine Richard MD       Anesthesia Po

## 2020-10-14 NOTE — OPERATIVE REPORT
[facility]  Operative Report    Keri Briones Location: OR   CSN [id number] MRN [id number]   Admission Date 10/14/2020 Operation Date 10/14/2020   Attending Physician Cole Byrne MD Operating Physician Mary Carmen Lechuga MD       Patient flexed in the jackknife position, the buttocks were taped apart, and the perineum was prepped with Betadine paint. Sterile drapes were placed with wide exposure of the perineum. Pre-operative antibiotics were given. A time-out was performed.     The anus wa 4x4's and paper tape. The patient was flipped back onto the hospital bed and anesthesia was terminated. The patient was transported to the recovery area in good condition without apparent intraoperative complication.  All sponge, needle, and instrument coun

## 2020-10-14 NOTE — ANESTHESIA PREPROCEDURE EVALUATION
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 Neuro/Psych    Negative neuro/psych ROS.                                 Past Surgical History:   Procedure Laterality Date   • ANTERIOR CERVICAL FUSION BG & INST 2 LEVEL 10/06/2020    MCV 89.3 10/06/2020    MCH 29.8 10/06/2020    MCHC 33.3 10/06/2020    RDW 13.2 10/06/2020    .0 10/06/2020     Lab Results   Component Value Date     10/06/2020    K 4.0 10/06/2020     10/06/2020    CO2 27.0 10/06/2020    B

## 2020-10-14 NOTE — ANESTHESIA PROCEDURE NOTES
Airway  Urgency: elective    Airway not difficult    General Information and Staff    Patient location during procedure: OR  Anesthesiologist: Syed Frederick MD  Resident/CRNA: Ailyn Swain CRNA  Performed: CRNA     Indications and Patient Condition  Ind

## 2020-10-14 NOTE — INTERVAL H&P NOTE
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

## 2020-10-14 NOTE — BRIEF OP NOTE
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

## 2020-10-16 ENCOUNTER — DIETICIAN VISIT (OUTPATIENT)
Dept: HEMATOLOGY/ONCOLOGY | Facility: HOSPITAL | Age: 60
End: 2020-10-16

## 2020-10-16 NOTE — PROGRESS NOTES
Nutrition screen complete as triggered by [facility] dx of [facility]. Chart reviewed. Noted tx pending. RD will plan to meet w/ pt once tx begins.

## 2020-10-23 ENCOUNTER — TELEPHONE (OUTPATIENT)
Dept: SURGERY | Facility: CLINIC | Age: 60
End: 2020-10-23

## 2020-10-23 DIAGNOSIS — C21.0 ADENOSQUAMOUS CARCINOMA OF ANUS (HCC): Primary | ICD-10-CM

## 2020-10-23 PROCEDURE — 99024 POSTOP FOLLOW-UP VISIT: CPT | Performed by: COLON & RECTAL SURGERY

## 2020-10-23 NOTE — TELEPHONE ENCOUNTER
I called the patient to discuss pathology results. The anal canal mass as well as the submucosal cystic lesion both were poorly differentiated carcinoma with squamous differentiation. Patient states this week her pain is much better than last week.

## 2020-10-26 ENCOUNTER — TELEPHONE (OUTPATIENT)
Dept: HEMATOLOGY/ONCOLOGY | Facility: HOSPITAL | Age: 60
End: 2020-10-26

## 2020-10-26 NOTE — TELEPHONE ENCOUNTER
Ninoska Collins called, would like to know if Dr. Cherelle Vail has been able to look at her new pathology reports. She would also like to speak with  [facility].

## 2020-10-26 NOTE — PROGRESS NOTES
IV Chemotherapy Education    Patient:Hina Bradshaw     Date: 10/29/2020   Diagnosis:  Anal cancer    Caregivers present: Spouse    Drug names:  5FU, Mitomycin    Myelosuppression  Decrease in wbc  Decrease in hgb  Decrease in platelets  Risk of infection

## 2020-10-29 ENCOUNTER — OFFICE VISIT (OUTPATIENT)
Dept: HEMATOLOGY/ONCOLOGY | Facility: HOSPITAL | Age: 60
End: 2020-10-29
Attending: CLINICAL NURSE SPECIALIST
Payer: COMMERCIAL

## 2020-10-29 DIAGNOSIS — C21.0 ADENOSQUAMOUS CARCINOMA OF ANUS (HCC): Primary | ICD-10-CM

## 2020-10-29 DIAGNOSIS — Z71.89 OTHER SPECIFIED COUNSELING: ICD-10-CM

## 2020-10-29 PROCEDURE — 99215 OFFICE O/P EST HI 40 MIN: CPT | Performed by: CLINICAL NURSE SPECIALIST

## 2020-10-29 RX ORDER — ONDANSETRON HYDROCHLORIDE 8 MG/1
8 TABLET, FILM COATED ORAL EVERY 8 HOURS PRN
Qty: 30 TABLET | Refills: 3 | Status: SHIPPED | OUTPATIENT
Start: 2020-10-29 | End: 2020-11-03

## 2020-10-29 RX ORDER — PROCHLORPERAZINE MALEATE 10 MG
10 TABLET ORAL EVERY 6 HOURS PRN
Qty: 30 TABLET | Refills: 3 | Status: SHIPPED | OUTPATIENT
Start: 2020-10-29 | End: 2021-05-19 | Stop reason: ALTCHOICE

## 2020-11-02 NOTE — PROGRESS NOTES
Office Visit Note       Active Problems      1. Adenosquamous carcinoma of anus ([address]    2. Rectal mass          Chief Complaint   Patient presents with:   Mass        History of Present Illness  Michael Wheeler is a 56year old female who presents today for transposition, carpal tunnel release right arm   • OTHER SURGICAL HISTORY  06/2018    C4-6 fusion   • POSTERIOR CERVICAL FUSION BG & INST 2 LEVEL N/A 6/29/2020    Performed by Zenobia Reed MD at [address]  06/2 Drinks per session: 1 or 2      Binge frequency: Never    Drug use: No       •  naproxen 500 MG Oral Tab, Take 1 tablet (500 mg total) by mouth 2 (two) times daily with meals.     •  traZODone HCl 50 MG Oral Tab, Take 1 tablet (50 mg total) by mouth nightly to person, place, and time. She appears well-developed and well-nourished. No distress. HENT:   Head: Normocephalic and atraumatic. Nose: Nose normal.   Eyes: Pupils are equal, round, and reactive to light. EOM are normal. No scleral icterus.    Neck: N

## 2020-11-03 ENCOUNTER — OFFICE VISIT (OUTPATIENT)
Dept: SURGERY | Facility: CLINIC | Age: 60
End: 2020-11-03

## 2020-11-03 VITALS
HEIGHT: 64 IN | DIASTOLIC BLOOD PRESSURE: 76 MMHG | HEART RATE: 82 BPM | SYSTOLIC BLOOD PRESSURE: 119 MMHG | WEIGHT: 136 LBS | BODY MASS INDEX: 23.22 KG/M2 | TEMPERATURE: 98 F

## 2020-11-03 DIAGNOSIS — C21.0 ANAL CANCER (HCC): Primary | ICD-10-CM

## 2020-11-03 PROCEDURE — 3008F BODY MASS INDEX DOCD: CPT | Performed by: COLON & RECTAL SURGERY

## 2020-11-03 PROCEDURE — 3078F DIAST BP <80 MM HG: CPT | Performed by: COLON & RECTAL SURGERY

## 2020-11-03 PROCEDURE — 3074F SYST BP LT 130 MM HG: CPT | Performed by: COLON & RECTAL SURGERY

## 2020-11-03 PROCEDURE — 99024 POSTOP FOLLOW-UP VISIT: CPT | Performed by: COLON & RECTAL SURGERY

## 2020-11-03 RX ORDER — ONDANSETRON 4 MG/1
4 TABLET, FILM COATED ORAL EVERY 8 HOURS PRN
COMMUNITY
End: 2021-05-19 | Stop reason: ALTCHOICE

## 2020-11-03 RX ORDER — ESCITALOPRAM OXALATE 10 MG/1
10 TABLET ORAL DAILY
COMMUNITY
Start: 2020-10-29 | End: 2021-05-19 | Stop reason: ALTCHOICE

## 2020-11-03 NOTE — PROGRESS NOTES
Office Visit Note       Active Problems      1.  Anal cancer [facility])          Chief Complaint   Patient presents with:  Anal Cancer        History of Present Illness  Grisel Cannon is a 56year old female who presents today for postoperative evaluation and t N/A 9/24/2020    Performed by Mae Bauer MD at [facility] ENDOSCOPY   • COLONOSCOPY, POSSIBLE BIOPSY, POSSIBLE POLYPECTOMY [address] N/A 5/5/2015    Performed by Fatoumata Medina MD at [address] [place] [address]  5/5/15    2 spl Paternal Uncle 45        CABG   • Heart Disorder Paternal Uncle 55         from CAD   • Diabetes Paternal Uncle    • Heart Disorder Paternal Uncle 61         from CAD   • Diabetes Paternal Uncle    • Heart Disorder Maternal Aunt    • Breast Cancer tightness and shortness of breath. Cardiovascular: Negative for chest pain and palpitations. Gastrointestinal: Negative for abdominal distention, blood in stool, constipation and diarrhea. Genitourinary: Negative for dysuria, frequency and urgency.

## 2020-11-04 NOTE — PROGRESS NOTES
Nursing Consultation Note  Patient: Ani Bingham  YOB: 1960  Age: 56year old  Radiation Oncologist: Dr. Johnathan Veloz  Referring Physician: Dr. Amie Ortega, Dr. Anselmo Barnes (PCP)  Diagnosis: ANORECTAL CANCER  Consult Date: 11/5 healing well. Referred for RT consult. Pt with mild fatigue, states still recuperating from cervical surgery  In June. BM regular, moves daily and stools are soft. No noted rectal bleeding except for 1 episode of spotting, no c/o rectal pain.  Denies dysuri Estradiol (ESTRACE) 0.1 MG/GM Vaginal Cream Apply pea sized amount to external vaginal area at HS.  (Patient not taking: Reported on 11/5/2020 ) 42.5 g 11       Preferred Pharmacy:    [facility] [address] [place] [address], [address] [place]   • SPINE SURGERY PROCEDURE UNLISTED  06/2020    cervical hardware (rods)   • ULNAR NERVE DECOMPRESSION Right 9/9/2019    Performed by Donal Krause MD at [facility] MAIN OR   • [address] Right 9/9/2019    Performed by Miller Strong Occupational Exposure: Not Asked        Hobby Hazards: Not Asked        Sleep Concern: Not Asked        Stress Concern: Not Asked        Weight Concern: Not Asked        Special Diet: Not Asked        Back Care: Not Asked        Exercise: Yes          occ

## 2020-11-05 ENCOUNTER — HOSPITAL ENCOUNTER (OUTPATIENT)
Dept: RADIATION ONCOLOGY | Facility: HOSPITAL | Age: 60
Discharge: HOME | End: 2020-11-05
Attending: INTERNAL MEDICINE
Payer: COMMERCIAL

## 2020-11-05 VITALS
SYSTOLIC BLOOD PRESSURE: 114 MMHG | TEMPERATURE: 98 F | HEART RATE: 76 BPM | OXYGEN SATURATION: 97 % | DIASTOLIC BLOOD PRESSURE: 75 MMHG | HEIGHT: 63.98 IN | BODY MASS INDEX: 23.36 KG/M2 | WEIGHT: 136.81 LBS | RESPIRATION RATE: 16 BRPM

## 2020-11-05 DIAGNOSIS — C21.1 MALIGNANT NEOPLASM OF ANAL CANAL (HCC): Primary | ICD-10-CM

## 2020-11-05 DIAGNOSIS — C21.1 PRIMARY CANCER OF ANAL CANAL (HCC): Primary | ICD-10-CM

## 2020-11-05 PROCEDURE — 99214 OFFICE O/P EST MOD 30 MIN: CPT

## 2020-11-05 NOTE — PATIENT INSTRUCTIONS
- CT SIMULATION SCHEDULED FOR November 12 AT 8:15 AM  , PLEASE FOLLOW FULL BLADDER INFORMATION FOR THE CT MAPPING SESSION.     - IF YOU HAVE ANY QUESTIONS OR CONCERNS REGARDING RADIATION THERAPY, PLEASE CALL [phone]

## 2020-11-05 NOTE — CONSULTS
[facility] RADIATION ONCOLOGY CONSULTATION     PATIENT:   Doris Hernandez MD:  Kareen Mcduffie MD      DIAGNOSIS:   T2 N0 M0 SCCA anal canal        CC:    Anal cancer    HPI   39-year-old woman here with her .   Presents with lowe post biopsy and subsequent excision by Dr. Guy Stock  History of abnormal Pap smears and endometriosis status post hysterectomy over 30 years ago  History of kidney stones, osteoporosis, some dysphagia related to cervical spine surgery and hardware, history of

## 2020-11-09 ENCOUNTER — OFFICE VISIT (OUTPATIENT)
Dept: FAMILY MEDICINE CLINIC | Facility: CLINIC | Age: 60
End: 2020-11-09
Payer: COMMERCIAL

## 2020-11-09 VITALS
HEIGHT: 64 IN | SYSTOLIC BLOOD PRESSURE: 132 MMHG | WEIGHT: 136 LBS | RESPIRATION RATE: 16 BRPM | DIASTOLIC BLOOD PRESSURE: 59 MMHG | HEART RATE: 88 BPM | TEMPERATURE: 99 F | BODY MASS INDEX: 23.22 KG/M2

## 2020-11-09 DIAGNOSIS — Z23 NEED FOR VACCINATION: ICD-10-CM

## 2020-11-09 DIAGNOSIS — Z12.72 SCREENING FOR VAGINAL CANCER: ICD-10-CM

## 2020-11-09 DIAGNOSIS — J01.90 ACUTE NON-RECURRENT SINUSITIS, UNSPECIFIED LOCATION: ICD-10-CM

## 2020-11-09 DIAGNOSIS — Z13.820 SCREENING FOR OSTEOPOROSIS: ICD-10-CM

## 2020-11-09 DIAGNOSIS — Z00.00 ROUTINE GENERAL MEDICAL EXAMINATION AT A HEALTH CARE FACILITY: Primary | ICD-10-CM

## 2020-11-09 PROCEDURE — 87624 HPV HI-RISK TYP POOLED RSLT: CPT | Performed by: FAMILY MEDICINE

## 2020-11-09 PROCEDURE — 3075F SYST BP GE 130 - 139MM HG: CPT | Performed by: FAMILY MEDICINE

## 2020-11-09 PROCEDURE — 99396 PREV VISIT EST AGE 40-64: CPT | Performed by: FAMILY MEDICINE

## 2020-11-09 PROCEDURE — 90471 IMMUNIZATION ADMIN: CPT | Performed by: FAMILY MEDICINE

## 2020-11-09 PROCEDURE — 3008F BODY MASS INDEX DOCD: CPT | Performed by: FAMILY MEDICINE

## 2020-11-09 PROCEDURE — 99072 ADDL SUPL MATRL&STAF TM PHE: CPT | Performed by: FAMILY MEDICINE

## 2020-11-09 PROCEDURE — 90686 IIV4 VACC NO PRSV 0.5 ML IM: CPT | Performed by: FAMILY MEDICINE

## 2020-11-09 PROCEDURE — 3078F DIAST BP <80 MM HG: CPT | Performed by: FAMILY MEDICINE

## 2020-11-09 RX ORDER — FLUTICASONE PROPIONATE 50 MCG
2 SPRAY, SUSPENSION (ML) NASAL DAILY
Qty: 1 BOTTLE | Refills: 0 | Status: SHIPPED | OUTPATIENT
Start: 2020-11-09 | End: 2020-11-23

## 2020-11-09 RX ORDER — AMOXICILLIN 875 MG/1
875 TABLET, COATED ORAL 2 TIMES DAILY
Qty: 20 TABLET | Refills: 0 | Status: SHIPPED | OUTPATIENT
Start: 2020-11-09 | End: 2020-11-23 | Stop reason: ALTCHOICE

## 2020-11-09 NOTE — PROGRESS NOTES
CHIEF COMPLAINT       Annual Physical Exam  HPI:   Cindy Smiley is a 56year old female who presents for a complete physical exam.   Radiation and chemo starting after Thanksgiving. Dr. Travon Phillips - onc. Paps normal.  Hx of hyst for bleeding.   One ovary r 30 tablet 3   • traZODone HCl 50 MG Oral Tab Take 1 tablet (50 mg total) by mouth nightly.  90 tablet 1   • Estradiol (ESTRACE) 0.1 MG/GM Vaginal Cream Apply pea sized amount to external vaginal area at HS. 42.5 g 11   • Multiple Vitamins-Minerals (MULTI-VI AND FUSION WITH INSTRUMENTATION, ALLOGRAFT AND  ALL INDICATED PROCEDURES   • OTHER  09/09/2019    RIGHT CARPAL TUNNEL RELEASE    • OTHER SURGICAL HISTORY      breast implants   • OTHER SURGICAL HISTORY  09/2019    Ulnar transposition, carpal tunnel release History:   Social History    Tobacco Use      Smoking status: Never Smoker      Smokeless tobacco: Never Used    Alcohol use: Not Currently      Frequency: Monthly or less      Drinks per session: 1 or 2      Binge frequency: Never    Drug use:  No explained. Pt' s weight is Body mass index is 23.34 kg/m². Edith Sagastume Recommended regular exercise. The patient indicates understanding of these issues and agrees to the plan.   Screening for osteoporosis  Screening for vaginal cancer  Need for vaccination  Routine

## 2020-11-09 NOTE — PATIENT INSTRUCTIONS
Flu shot today. Check with Dr. Tomás Santos to see if he wants you to do pneumonia vaccines and if so, the timing and the order of the vaccines. Does Dr. Tomás Santos want you to do a bone density scan now? Ask about timing of this.

## 2020-11-11 RX ORDER — ACETAMINOPHEN 500 MG
1000 TABLET ORAL ONCE
Status: CANCELLED | OUTPATIENT
Start: 2020-11-11 | End: 2020-11-11

## 2020-11-12 ENCOUNTER — HOSPITAL ENCOUNTER (OUTPATIENT)
Dept: RADIATION ONCOLOGY | Facility: HOSPITAL | Age: 60
Discharge: HOME | End: 2020-11-12
Attending: RADIOLOGY
Payer: COMMERCIAL

## 2020-11-12 PROCEDURE — 77399 UNLISTED PX MED RADJ PHYSICS: CPT | Performed by: RADIOLOGY

## 2020-11-12 PROCEDURE — 77334 RADIATION TREATMENT AID(S): CPT | Performed by: RADIOLOGY

## 2020-11-12 PROCEDURE — 77470 SPECIAL RADIATION TREATMENT: CPT | Performed by: RADIOLOGY

## 2020-11-15 ENCOUNTER — APPOINTMENT (OUTPATIENT)
Dept: LAB | Facility: HOSPITAL | Age: 60
End: 2020-11-15
Attending: COLON & RECTAL SURGERY
Payer: COMMERCIAL

## 2020-11-15 DIAGNOSIS — C21.0 ADENOSQUAMOUS CARCINOMA OF ANUS (HCC): ICD-10-CM

## 2020-11-15 NOTE — PATIENT INSTRUCTIONS
Assessment   Anal cancer ([facility]  (primary encounter diagnosis)    Plan   The patient is doing well post-operatively. The excision site was intact and healing. Will plan for port-a-cath placement 11/18/20. Follow up 11/23.     Tentatively hope to start

## 2020-11-17 DIAGNOSIS — C21.0 ANAL CANCER (HCC): Primary | ICD-10-CM

## 2020-11-18 ENCOUNTER — APPOINTMENT (OUTPATIENT)
Dept: GENERAL RADIOLOGY | Facility: HOSPITAL | Age: 60
End: 2020-11-18
Attending: COLON & RECTAL SURGERY
Payer: COMMERCIAL

## 2020-11-18 ENCOUNTER — ANESTHESIA EVENT (OUTPATIENT)
Dept: SURGERY | Facility: HOSPITAL | Age: 60
End: 2020-11-18
Payer: COMMERCIAL

## 2020-11-18 ENCOUNTER — ANESTHESIA (OUTPATIENT)
Dept: SURGERY | Facility: HOSPITAL | Age: 60
End: 2020-11-18
Payer: COMMERCIAL

## 2020-11-18 ENCOUNTER — HOSPITAL ENCOUNTER (OUTPATIENT)
Facility: HOSPITAL | Age: 60
Setting detail: HOSPITAL OUTPATIENT SURGERY
Discharge: HOME | End: 2020-11-18
Attending: COLON & RECTAL SURGERY | Admitting: COLON & RECTAL SURGERY
Payer: COMMERCIAL

## 2020-11-18 VITALS
BODY MASS INDEX: 23.18 KG/M2 | RESPIRATION RATE: 16 BRPM | HEIGHT: 64 IN | HEART RATE: 63 BPM | TEMPERATURE: 98 F | OXYGEN SATURATION: 97 % | WEIGHT: 135.81 LBS | DIASTOLIC BLOOD PRESSURE: 61 MMHG | SYSTOLIC BLOOD PRESSURE: 108 MMHG

## 2020-11-18 DIAGNOSIS — C21.0 ANAL CANCER (HCC): ICD-10-CM

## 2020-11-18 DIAGNOSIS — C21.0 ADENOSQUAMOUS CARCINOMA OF ANUS (HCC): Primary | ICD-10-CM

## 2020-11-18 PROCEDURE — 71045 X-RAY EXAM CHEST 1 VIEW: CPT | Performed by: COLON & RECTAL SURGERY

## 2020-11-18 PROCEDURE — 36561 INSERT TUNNELED CV CATH: CPT | Performed by: COLON & RECTAL SURGERY

## 2020-11-18 PROCEDURE — 0JH60WZ INSERTION OF TOTALLY IMPLANTABLE VASCULAR ACCESS DEVICE INTO CHEST SUBCUTANEOUS TISSUE AND FASCIA, OPEN APPROACH: ICD-10-PCS | Performed by: COLON & RECTAL SURGERY

## 2020-11-18 PROCEDURE — 77001 FLUOROGUIDE FOR VEIN DEVICE: CPT | Performed by: COLON & RECTAL SURGERY

## 2020-11-18 DEVICE — POWERPORT CLEARVUE ISP WITH SMOOTH SEPTUM, 8F POLYURETHANE CATHETER, INTERMEDIATE KIT (STEALTH)
Type: IMPLANTABLE DEVICE | Site: CHEST | Status: NON-FUNCTIONAL
Brand: POWERPORT CLEARVUE

## 2020-11-18 RX ORDER — ACETAMINOPHEN 500 MG
1000 TABLET ORAL ONCE AS NEEDED
Status: DISCONTINUED | OUTPATIENT
Start: 2020-11-18 | End: 2020-11-18

## 2020-11-18 RX ORDER — HYDROCODONE BITARTRATE AND ACETAMINOPHEN 5; 325 MG/1; MG/1
1 TABLET ORAL AS NEEDED
Status: DISCONTINUED | OUTPATIENT
Start: 2020-11-18 | End: 2020-11-18

## 2020-11-18 RX ORDER — LIDOCAINE HYDROCHLORIDE 10 MG/ML
INJECTION, SOLUTION INFILTRATION; PERINEURAL AS NEEDED
Status: DISCONTINUED | OUTPATIENT
Start: 2020-11-18 | End: 2020-11-18 | Stop reason: HOSPADM

## 2020-11-18 RX ORDER — CEFAZOLIN SODIUM/WATER 2 G/20 ML
2 SYRINGE (ML) INTRAVENOUS ONCE
Status: COMPLETED | OUTPATIENT
Start: 2020-11-18 | End: 2020-11-18

## 2020-11-18 RX ORDER — BUPIVACAINE HYDROCHLORIDE AND EPINEPHRINE 5; 5 MG/ML; UG/ML
INJECTION, SOLUTION EPIDURAL; INTRACAUDAL; PERINEURAL AS NEEDED
Status: DISCONTINUED | OUTPATIENT
Start: 2020-11-18 | End: 2020-11-18 | Stop reason: HOSPADM

## 2020-11-18 RX ORDER — HYDROCODONE BITARTRATE AND ACETAMINOPHEN 5; 325 MG/1; MG/1
2 TABLET ORAL AS NEEDED
Status: DISCONTINUED | OUTPATIENT
Start: 2020-11-18 | End: 2020-11-18

## 2020-11-18 RX ORDER — HYDROMORPHONE HYDROCHLORIDE 1 MG/ML
0.4 INJECTION, SOLUTION INTRAMUSCULAR; INTRAVENOUS; SUBCUTANEOUS EVERY 5 MIN PRN
Status: DISCONTINUED | OUTPATIENT
Start: 2020-11-18 | End: 2020-11-18

## 2020-11-18 RX ORDER — SODIUM CHLORIDE, SODIUM LACTATE, POTASSIUM CHLORIDE, CALCIUM CHLORIDE 600; 310; 30; 20 MG/100ML; MG/100ML; MG/100ML; MG/100ML
INJECTION, SOLUTION INTRAVENOUS CONTINUOUS
Status: DISCONTINUED | OUTPATIENT
Start: 2020-11-18 | End: 2020-11-18

## 2020-11-18 RX ORDER — NALOXONE HYDROCHLORIDE 0.4 MG/ML
80 INJECTION, SOLUTION INTRAMUSCULAR; INTRAVENOUS; SUBCUTANEOUS AS NEEDED
Status: DISCONTINUED | OUTPATIENT
Start: 2020-11-18 | End: 2020-11-18

## 2020-11-18 RX ORDER — CEFAZOLIN SODIUM/WATER 2 G/20 ML
SYRINGE (ML) INTRAVENOUS
Status: DISCONTINUED
Start: 2020-11-18 | End: 2020-11-18

## 2020-11-18 RX ORDER — MEPERIDINE HYDROCHLORIDE 25 MG/ML
25 INJECTION INTRAMUSCULAR; INTRAVENOUS; SUBCUTANEOUS
Status: DISCONTINUED | OUTPATIENT
Start: 2020-11-18 | End: 2020-11-18

## 2020-11-18 RX ORDER — ACETAMINOPHEN 500 MG
1000 TABLET ORAL ONCE
COMMUNITY
End: 2020-11-23

## 2020-11-18 RX ORDER — KETOROLAC TROMETHAMINE 30 MG/ML
15 INJECTION, SOLUTION INTRAMUSCULAR; INTRAVENOUS EVERY 6 HOURS PRN
Status: DISCONTINUED | OUTPATIENT
Start: 2020-11-18 | End: 2020-11-18

## 2020-11-18 RX ORDER — KETOROLAC TROMETHAMINE 30 MG/ML
30 INJECTION, SOLUTION INTRAMUSCULAR; INTRAVENOUS EVERY 6 HOURS PRN
Status: DISCONTINUED | OUTPATIENT
Start: 2020-11-18 | End: 2020-11-18

## 2020-11-18 RX ADMIN — CEFAZOLIN SODIUM/WATER 2 G: 2 G/20 ML SYRINGE (ML) INTRAVENOUS at 13:15:00

## 2020-11-18 NOTE — ANESTHESIA PREPROCEDURE EVALUATION
PRE-OP EVALUATION    Patient Name: Ronnie Ureña    Pre-op Diagnosis: Anal cancer ([address] [C21.0]    Procedure(s):  Port-A-Cath Placement with fluroscopy, left chest    Surgeon(s) and Role:     * Nilda Best MD - Primary    Pre-op vitals reviewed Iodinated Dyes, and Skin Adhesives      Anesthesia Evaluation    Patient summary reviewed.     Anesthetic Complications  (+) history of anesthetic complications  History of: PONV       GI/Hepatic/Renal                    (+) colon cancer             [facility] MAIN OR   • WRIST CARPAL TUNNEL RELEASE Right 9/9/2019    Performed by Lisa Anderson MD at [facility] MAIN OR     Social History    Tobacco Use      Smoking status: Never Smoker      Smokeless tobacco: Never Used    Alcohol use: Not Currently      Frequency

## 2020-11-18 NOTE — H&P
Office Visit Note        See note below. Patient presents for port a cath placement. All questions answered. Proceed with surgery. Joce Flores MD  [facility] Surgery  11/18/2020  12:53 PM          Active Problems      1.  Adenosquamous carcinoma of Laterality Date   • ANTERIOR CERVICAL FUSION BG & INST 2 LEVEL N/A 6/4/2018    Performed by Pam Lynn MD at [facility] MAIN OR   • [address], 2006    Breast Enlargement, Fibroid?    • COLONOSCOPY N/A 9/24/2020    Performed b Grandfather          from AMI   • Heart Disorder Paternal Uncle 27         from AMI   • Heart Disorder Paternal Uncle 39         from AMI   • Cancer Paternal Uncle 50         from lung cancer   • Heart Disorder Paternal Uncle 45        CABG zoledronic acid (RECLAST) 5 MG/100ML Intravenous Solution, infuse 5 mg IVPB over 20 minutes once yearly         Review of Systems  The Review of Systems has been reviewed by me during today.   Review of Systems   Constitutional: Negative for chills and fev The patient is doing well post-operatively. The excision site was intact and healing. Will plan for port-a-cath placement 11/18/20. Follow up 11/23. Tentatively hope to start chemoradiation first week of December, about 6 weeks post-op.

## 2020-11-18 NOTE — OPERATIVE REPORT
[facility]  Operative Note    Johanne Vaca Location: OR   CSN [id number] MRN [id number]    1960 Age 56year old   Admission Date 2020 Operation Date 2020   Attending Physician Monica Chamberlain MD Operating Physician Kalyan Abarca technique, the left subclavian vein was cannulated on first attempt with a needle and syringe. There was immediate return of dark venous blood that was nonpulsatile.  A guidewire was placed into the subclavian vein via the needle, and fluoroscopy was used t suture. The skin was covered with dermabond. The patient was awakened from sedation and brought to the recovery room in stable condition, having tolerated the procedure without apparent complication.  Needle, sponge, and instrument counts were correct at th

## 2020-11-18 NOTE — ANESTHESIA POSTPROCEDURE EVALUATION
[address] [facility] Patient Status:  Hospital Outpatient Surgery   Age/Gender 56year old female MRN [id number]   Location [address] [place] [address] Attending Mae Bauer MD   Hosp Day # 0 PCP Gregorio Quinn MD

## 2020-11-18 NOTE — ADDENDUM NOTE
Addendum  created 11/18/20 1436 by Tono Stafford MD    Intraprocedure Meds edited, Review and Sign - Ready for Procedure, Review and Sign - Signed

## 2020-11-20 PROCEDURE — 77338 DESIGN MLC DEVICE FOR IMRT: CPT | Performed by: RADIOLOGY

## 2020-11-20 PROCEDURE — 77301 RADIOTHERAPY DOSE PLAN IMRT: CPT | Performed by: RADIOLOGY

## 2020-11-20 PROCEDURE — 77300 RADIATION THERAPY DOSE PLAN: CPT | Performed by: RADIOLOGY

## 2020-11-23 ENCOUNTER — OFFICE VISIT (OUTPATIENT)
Dept: SURGERY | Facility: CLINIC | Age: 60
End: 2020-11-23

## 2020-11-23 VITALS
DIASTOLIC BLOOD PRESSURE: 68 MMHG | BODY MASS INDEX: 23.67 KG/M2 | TEMPERATURE: 97 F | HEART RATE: 108 BPM | SYSTOLIC BLOOD PRESSURE: 100 MMHG | WEIGHT: 138.63 LBS | HEIGHT: 64 IN

## 2020-11-23 DIAGNOSIS — Z09 FOLLOW-UP EXAMINATION: Primary | ICD-10-CM

## 2020-11-23 DIAGNOSIS — C21.0 ANAL CANCER (HCC): ICD-10-CM

## 2020-11-23 PROCEDURE — 3008F BODY MASS INDEX DOCD: CPT | Performed by: COLON & RECTAL SURGERY

## 2020-11-23 PROCEDURE — 3074F SYST BP LT 130 MM HG: CPT | Performed by: COLON & RECTAL SURGERY

## 2020-11-23 PROCEDURE — 3078F DIAST BP <80 MM HG: CPT | Performed by: COLON & RECTAL SURGERY

## 2020-11-23 PROCEDURE — 99024 POSTOP FOLLOW-UP VISIT: CPT | Performed by: COLON & RECTAL SURGERY

## 2020-11-24 ENCOUNTER — TELEPHONE (OUTPATIENT)
Dept: HEMATOLOGY/ONCOLOGY | Facility: HOSPITAL | Age: 60
End: 2020-11-24

## 2020-11-24 NOTE — PATIENT INSTRUCTIONS
Assessment   Follow-up examination  (primary encounter diagnosis)  Anal cancer [facility])    Plan   Patient is doing well status post excision of anal canal distal rectal submucosal lesion.   This was consistent mostly with anal squamous cell carcinoma, though t

## 2020-11-24 NOTE — PROGRESS NOTES
Office Visit Note       Active Problems      1. Follow-up examination    2.  Anal cancer [facility])          Chief Complaint   Patient presents with:  Post-Op        History of Present Illness  Johanne Vaca is a 56year old female who presents today for postop Performed by Boni Hanson MD at [address]    has 1 ovary left   • OTHER  06/04/2018    CERVICAL 4 - CERVICAL 5, CERVICAL 5 - CERVICAL 6  ANTERIOR CERVICAL DISCECTOMY AND FUSION WITH INSTRUMENTATION, ALLOGRAFT AND  ALL MIA Cancer Maternal Aunt         post menopausal   • Heart Disorder Maternal Aunt    • Breast Cancer Maternal Aunt         post menopausal   • Heart Disorder Maternal Aunt    • Breast Cancer Maternal Aunt         pre menopausal   • Ovarian Cancer Paternal Cous pain, anal bleeding, blood in stool, constipation, diarrhea, nausea, rectal pain and vomiting. Endocrine: Negative for polydipsia and polyuria. Genitourinary: Negative for dysuria and hematuria. Musculoskeletal: Negative for arthralgias and myalgias. lesions. Anoscopy was performed. The right anterior location at the site of the previously excised lesion is intact without inflammation or granulation.        Assessment   Follow-up examination  (primary encounter diagnosis)  Anal cancer [facility])    Plan

## 2020-11-27 ENCOUNTER — APPOINTMENT (OUTPATIENT)
Dept: LAB | Facility: HOSPITAL | Age: 60
End: 2020-11-27
Attending: RADIOLOGY
Payer: COMMERCIAL

## 2020-11-27 DIAGNOSIS — C21.1 MALIGNANT NEOPLASM OF ANAL CANAL (HCC): ICD-10-CM

## 2020-11-30 ENCOUNTER — HOSPITAL ENCOUNTER (OUTPATIENT)
Dept: RADIATION ONCOLOGY | Facility: HOSPITAL | Age: 60
Discharge: HOME | End: 2020-11-30
Attending: INTERNAL MEDICINE
Payer: COMMERCIAL

## 2020-11-30 ENCOUNTER — SOCIAL WORK SERVICES (OUTPATIENT)
Dept: HEMATOLOGY/ONCOLOGY | Facility: HOSPITAL | Age: 60
End: 2020-11-30

## 2020-11-30 ENCOUNTER — OFFICE VISIT (OUTPATIENT)
Dept: HEMATOLOGY/ONCOLOGY | Facility: HOSPITAL | Age: 60
End: 2020-11-30
Attending: CLINICAL NURSE SPECIALIST
Payer: COMMERCIAL

## 2020-11-30 ENCOUNTER — HOSPITAL ENCOUNTER (OUTPATIENT)
Dept: RADIATION ONCOLOGY | Facility: HOSPITAL | Age: 60
Discharge: HOME | End: 2020-11-30
Attending: RADIOLOGY
Payer: COMMERCIAL

## 2020-11-30 VITALS
HEART RATE: 73 BPM | RESPIRATION RATE: 18 BRPM | OXYGEN SATURATION: 98 % | TEMPERATURE: 98 F | BODY MASS INDEX: 23 KG/M2 | DIASTOLIC BLOOD PRESSURE: 73 MMHG | SYSTOLIC BLOOD PRESSURE: 129 MMHG | WEIGHT: 136 LBS

## 2020-11-30 VITALS
HEART RATE: 72 BPM | HEIGHT: 63.86 IN | TEMPERATURE: 98 F | DIASTOLIC BLOOD PRESSURE: 83 MMHG | SYSTOLIC BLOOD PRESSURE: 138 MMHG | OXYGEN SATURATION: 97 % | WEIGHT: 136 LBS | RESPIRATION RATE: 16 BRPM | BODY MASS INDEX: 23.51 KG/M2

## 2020-11-30 DIAGNOSIS — C21.0 ADENOSQUAMOUS CARCINOMA OF ANUS (HCC): Primary | ICD-10-CM

## 2020-11-30 DIAGNOSIS — Z51.11 ENCOUNTER FOR CHEMOTHERAPY MANAGEMENT: ICD-10-CM

## 2020-11-30 DIAGNOSIS — C21.1 PRIMARY CANCER OF ANAL CANAL (HCC): Primary | ICD-10-CM

## 2020-11-30 PROCEDURE — 85025 COMPLETE CBC W/AUTO DIFF WBC: CPT

## 2020-11-30 PROCEDURE — 96409 CHEMO IV PUSH SNGL DRUG: CPT

## 2020-11-30 PROCEDURE — 99213 OFFICE O/P EST LOW 20 MIN: CPT | Performed by: CLINICAL NURSE SPECIALIST

## 2020-11-30 PROCEDURE — 96374 THER/PROPH/DIAG INJ IV PUSH: CPT

## 2020-11-30 PROCEDURE — 77386 HC IMRT COMPLEX: CPT | Performed by: RADIOLOGY

## 2020-11-30 PROCEDURE — 80053 COMPREHEN METABOLIC PANEL: CPT

## 2020-11-30 PROCEDURE — 96375 TX/PRO/DX INJ NEW DRUG ADDON: CPT

## 2020-11-30 RX ORDER — FLUOROURACIL 50 MG/ML
4000 INJECTION, SOLUTION INTRAVENOUS CONTINUOUS
Status: DISCONTINUED | OUTPATIENT
Start: 2020-11-30 | End: 2020-11-30

## 2020-11-30 RX ORDER — FLUOROURACIL 50 MG/ML
4000 INJECTION, SOLUTION INTRAVENOUS CONTINUOUS
Status: CANCELLED | OUTPATIENT
Start: 2020-12-28

## 2020-11-30 RX ORDER — FLUOROURACIL 50 MG/ML
4000 INJECTION, SOLUTION INTRAVENOUS CONTINUOUS
Status: CANCELLED | OUTPATIENT
Start: 2020-11-30

## 2020-11-30 RX ORDER — NAPROXEN 500 MG/1
TABLET ORAL
COMMUNITY
Start: 2020-11-04 | End: 2021-05-19 | Stop reason: ALTCHOICE

## 2020-11-30 RX ADMIN — FLUOROURACIL 6650 MG: 50 INJECTION, SOLUTION INTRAVENOUS at 13:18:00

## 2020-11-30 NOTE — PROGRESS NOTES
Patient presents with: Follow - Up: APN assessment prior to treatment    Pt is here for treatment; C1 D1 5FU/mitomycin is expected. At baseline patient denies N,V,D,C or pain. She has no problems eating or drinking.     Education Record    Learner:  Darell

## 2020-11-30 NOTE — PROGRESS NOTES
Cancer Center Progress Note    Patient Name: Kojo Cabral   YOB: 1960   Medical Record Number: [id number]   CSN: [id number]   Date of visit: 11/30/2020  Provider: CASANDRA Roberson  Referring Physician: No ref.  provider found    Proble Comment:Fish iodine  Radiology Contrast *    ANAPHYLAXIS  Skin Adhesives          RASH    Comment:Skin glue, dermabond    Vital Signs:  Height: 162.2 cm (5' 3.86\") (11/30 1030)  Weight: 61.7 kg (136 lb) (11/30 1030)  BSA (Calculated - sq m): 1.66 sq meter normoactive bowel sounds, soft,nontender.   Extremities:  No edema, no tenderness  Neuro:  CN 2-12 intact    Labs:  Recent Results (from the past 24 hour(s))   COMP METABOLIC PANEL (14)    Collection Time: 11/30/20 10:35 AM   Result Value Ref Range    Gluco 5FU/Mitomycin. She expresses no concerns or questions pertaining to the chemotherapy drugs and symptom mgmt. She has PRN Compazine. Weekly lab. Follow up with Dr. Javier Desir for 1 wk follow up.       Ronni Aldana, [address] [place] [address] Place

## 2020-11-30 NOTE — PROGRESS NOTES
[facility] Radiation Treatment Management Note 1-5    Patient:  Coby Mcbride  Age:  56year old  Visit Diagnosis:  T2 N0 anal CA  Primary Rad/Onc:  Dr. Doreatha Meckel    Site Delivered Dose (cGy) Prescribed Dose (cGy) Fraction #   Pelvis

## 2020-11-30 NOTE — PROGRESS NOTES
Pt here for C1D1.   Arrives Ambulating independently, accompanied by Spouse           Patient reports possible pregnancy since last therapy cycle: No    Modifications in dose or schedule: No     [facility] of blood return and [facility]

## 2020-11-30 NOTE — PROGRESS NOTES
met with Patient and  Arash Hill in Treatment Room for introduction and role explanation. Patient reports that she is doing “fine” and is ready to start treatment.  She reports that she will be doing daily Radiation, along with Chemo Treatment

## 2020-12-01 ENCOUNTER — TELEPHONE (OUTPATIENT)
Dept: HEMATOLOGY/ONCOLOGY | Facility: HOSPITAL | Age: 60
End: 2020-12-01

## 2020-12-01 ENCOUNTER — HOSPITAL ENCOUNTER (OUTPATIENT)
Dept: RADIATION ONCOLOGY | Facility: HOSPITAL | Age: 60
Discharge: HOME | End: 2020-12-01
Attending: RADIOLOGY
Payer: COMMERCIAL

## 2020-12-01 PROCEDURE — 77386 HC IMRT COMPLEX: CPT | Performed by: RADIOLOGY

## 2020-12-01 NOTE — TELEPHONE ENCOUNTER
Toxicities: C1 D1 Fluorouracil/Mitomycin with RT on 11/30/2020    [address] said she is feeling good. She does feel some itchiness on the skin under the tegaderm dressing by her portacath. No redness. She asked if she may take a zyrtec? I told her yes.  I asked

## 2020-12-02 PROCEDURE — 77386 HC IMRT COMPLEX: CPT | Performed by: RADIOLOGY

## 2020-12-03 ENCOUNTER — OFFICE VISIT (OUTPATIENT)
Dept: HEMATOLOGY/ONCOLOGY | Facility: HOSPITAL | Age: 60
End: 2020-12-03
Attending: CLINICAL NURSE SPECIALIST
Payer: COMMERCIAL

## 2020-12-03 PROCEDURE — 77386 HC IMRT COMPLEX: CPT | Performed by: RADIOLOGY

## 2020-12-04 ENCOUNTER — NURSE ONLY (OUTPATIENT)
Dept: HEMATOLOGY/ONCOLOGY | Facility: HOSPITAL | Age: 60
End: 2020-12-04
Attending: CLINICAL NURSE SPECIALIST
Payer: COMMERCIAL

## 2020-12-04 PROCEDURE — 96523 IRRIG DRUG DELIVERY DEVICE: CPT

## 2020-12-04 PROCEDURE — 77386 HC IMRT COMPLEX: CPT | Performed by: RADIOLOGY

## 2020-12-04 PROCEDURE — 77336 RADIATION PHYSICS CONSULT: CPT | Performed by: RADIOLOGY

## 2020-12-04 NOTE — PROGRESS NOTES
Education Record    Learner:  Patient    Disease / Diagnosis: Anal Cancer    Barriers / Limitations:  None   Comments:    Method:  Brief focused   Comments:    General Topics:  Plan of care reviewed   Comments:    Outcome:  Shows understanding   Comments:

## 2020-12-04 NOTE — PROGRESS NOTES
Nutrition Consultation    Patient Name: Alis Barraza  YOB: 1960  Medical Record Number: [id number]   Account Number: [de-identified]  Dietitian: Nils Suazo RD, TORIBIO      Date of visit: 12/3/2020    Diet Rx: high protein/calorie, low residu lbs  10/08/20: 135 lbs 8 oz    Estimated Nutrition Needs: 30-35 kcals/kg = 9748-7648 KCALS/d; 1.5 gms protein/kg = 93 gms/d    Services Provided: Verbal and written ix provided addressing - high protein/calorie, low residue prn; samples/coupons for Ensure

## 2020-12-07 ENCOUNTER — HOSPITAL ENCOUNTER (OUTPATIENT)
Dept: RADIATION ONCOLOGY | Facility: HOSPITAL | Age: 60
Discharge: HOME | End: 2020-12-07
Attending: RADIOLOGY
Payer: COMMERCIAL

## 2020-12-07 VITALS
BODY MASS INDEX: 23 KG/M2 | RESPIRATION RATE: 16 BRPM | DIASTOLIC BLOOD PRESSURE: 73 MMHG | OXYGEN SATURATION: 100 % | HEART RATE: 81 BPM | WEIGHT: 136 LBS | SYSTOLIC BLOOD PRESSURE: 116 MMHG | TEMPERATURE: 98 F

## 2020-12-07 DIAGNOSIS — C21.1 PRIMARY CANCER OF ANAL CANAL (HCC): Primary | ICD-10-CM

## 2020-12-07 PROCEDURE — 77386 HC IMRT COMPLEX: CPT | Performed by: RADIOLOGY

## 2020-12-07 NOTE — PROGRESS NOTES
[facility] Radiation Treatment Management Note 6-10    Patient:  Pushpa Ann  Age:  56year old  Visit Diagnosis:  T2 N0 anal CA  Primary Rad/Onc:  Dr. Nathan Edmondson    Site Delivered Dose (cGy) Prescribed Dose (cGy) Fraction #   Pelvi

## 2020-12-08 ENCOUNTER — NURSE ONLY (OUTPATIENT)
Dept: HEMATOLOGY/ONCOLOGY | Facility: HOSPITAL | Age: 60
End: 2020-12-08
Attending: CLINICAL NURSE SPECIALIST
Payer: COMMERCIAL

## 2020-12-08 VITALS
HEART RATE: 83 BPM | WEIGHT: 136.63 LBS | RESPIRATION RATE: 16 BRPM | OXYGEN SATURATION: 97 % | TEMPERATURE: 98 F | DIASTOLIC BLOOD PRESSURE: 79 MMHG | HEIGHT: 63.86 IN | SYSTOLIC BLOOD PRESSURE: 133 MMHG | BODY MASS INDEX: 23.61 KG/M2

## 2020-12-08 DIAGNOSIS — C21.0 ADENOSQUAMOUS CARCINOMA OF ANUS (HCC): Primary | ICD-10-CM

## 2020-12-08 PROCEDURE — 99213 OFFICE O/P EST LOW 20 MIN: CPT | Performed by: INTERNAL MEDICINE

## 2020-12-08 PROCEDURE — 77386 HC IMRT COMPLEX: CPT | Performed by: RADIOLOGY

## 2020-12-08 PROCEDURE — 36591 DRAW BLOOD OFF VENOUS DEVICE: CPT

## 2020-12-08 RX ORDER — SODIUM CHLORIDE 0.9 % (FLUSH) 0.9 %
10 SYRINGE (ML) INJECTION ONCE
Status: CANCELLED | OUTPATIENT
Start: 2020-12-08

## 2020-12-08 RX ORDER — PANTOPRAZOLE SODIUM 40 MG/1
40 TABLET, DELAYED RELEASE ORAL DAILY
Qty: 30 TABLET | Refills: 0 | Status: SHIPPED | OUTPATIENT
Start: 2020-12-08 | End: 2021-05-19 | Stop reason: ALTCHOICE

## 2020-12-08 RX ORDER — SODIUM CHLORIDE 0.9 % (FLUSH) 0.9 %
10 SYRINGE (ML) INJECTION ONCE
OUTPATIENT
Start: 2020-12-08

## 2020-12-08 RX ORDER — SODIUM CHLORIDE 0.9 % (FLUSH) 0.9 %
10 SYRINGE (ML) INJECTION ONCE
Status: COMPLETED | OUTPATIENT
Start: 2020-12-08 | End: 2020-12-08

## 2020-12-08 RX ADMIN — SODIUM CHLORIDE 0.9 % (FLUSH) 10 ML: 0.9 % SYRINGE (ML) INJECTION at 08:54:00

## 2020-12-08 NOTE — PROGRESS NOTES
[facility] Hematology and Oncology Clinic Note    Diagnosis: cT2 N0 MX Anal SCC    Treatment History:  1.  ChemoRT with 5-FU 4000 mg/m2 D1-4 and D29-32 + MMC 10 mg/m2 on D1 and D29    Visit Diagnosis:  Adenosquamous carcinoma of anus (HCC)  (primary encounter d C1D8. She did notice some fatigue during he first week. She was mildly nauseated. Stools are soft without blood. She may have developed some thrush during her treatment. She developed a few mouth sores that were tender. Appetite is lower but she is eating. complication    • Back problem     cervical surgery   • Calculus of kidney    • [facility] 10/2020    anus   • Osteoporosis    • PONV (postoperative nausea and vomiting)     nausea   • Problems with swallowing     plate in front of neck- causes some diff Right 9/9/2019    Performed by Carlin Singh MD at [facility] MAIN OR     Social History    Socioeconomic History      Marital status:       Spouse name: Not on file      Number of children: 1      Years of education: Not on file      Highest educati Exam  Height: 162.2 cm (5' 3.86\") (12/08 0855)  Weight: 62 kg (136 lb 9.6 oz) (12/08 0855)  BSA (Calculated - sq m): 1.66 sq meters (12/08 0855)  Pulse: 83 (12/08 0855)  BP: 133/79 (12/08 0855)  Temp: 98.2 °F (36.8 °C) (12/08 0855)  Do Not Use - Resp Rate swish/spit  · Will need genetics referral   · Due for a mammogram     Checklist:   - ECO  - Access:Port  - Surveillance: pending  - Genetics:will need referral based on FH  - Bone health: s/p back surgery  - Cardiac function:normal  - Renal Function:nor

## 2020-12-08 NOTE — PROGRESS NOTES
Outpatient Oncology Care Plan   Problem list:   knowledge deficit   Problems related to:   chemotherapy   Interventions:   provided general teaching   Expected outcomes:   understands plan of care   Progress towards outcome: making progress     Education R

## 2020-12-09 PROCEDURE — 77386 HC IMRT COMPLEX: CPT | Performed by: RADIOLOGY

## 2020-12-10 PROCEDURE — 77386 HC IMRT COMPLEX: CPT | Performed by: RADIOLOGY

## 2020-12-11 PROCEDURE — 77386 HC IMRT COMPLEX: CPT | Performed by: RADIOLOGY

## 2020-12-11 PROCEDURE — 77336 RADIATION PHYSICS CONSULT: CPT | Performed by: RADIOLOGY

## 2020-12-14 ENCOUNTER — HOSPITAL ENCOUNTER (OUTPATIENT)
Dept: RADIATION ONCOLOGY | Facility: HOSPITAL | Age: 60
Discharge: HOME | End: 2020-12-14
Attending: RADIOLOGY
Payer: COMMERCIAL

## 2020-12-14 ENCOUNTER — NURSE ONLY (OUTPATIENT)
Dept: HEMATOLOGY/ONCOLOGY | Facility: HOSPITAL | Age: 60
End: 2020-12-14
Attending: INTERNAL MEDICINE
Payer: COMMERCIAL

## 2020-12-14 ENCOUNTER — TELEPHONE (OUTPATIENT)
Dept: HEMATOLOGY/ONCOLOGY | Facility: HOSPITAL | Age: 60
End: 2020-12-14

## 2020-12-14 VITALS
TEMPERATURE: 98 F | SYSTOLIC BLOOD PRESSURE: 124 MMHG | HEART RATE: 74 BPM | DIASTOLIC BLOOD PRESSURE: 66 MMHG | OXYGEN SATURATION: 99 % | BODY MASS INDEX: 23 KG/M2 | RESPIRATION RATE: 18 BRPM | WEIGHT: 136 LBS

## 2020-12-14 DIAGNOSIS — C21.0 ADENOSQUAMOUS CARCINOMA OF ANUS (HCC): Primary | ICD-10-CM

## 2020-12-14 DIAGNOSIS — C21.1 PRIMARY CANCER OF ANAL CANAL (HCC): Primary | ICD-10-CM

## 2020-12-14 DIAGNOSIS — C21.0 ADENOSQUAMOUS CARCINOMA OF ANUS (HCC): ICD-10-CM

## 2020-12-14 PROCEDURE — 85025 COMPLETE CBC W/AUTO DIFF WBC: CPT

## 2020-12-14 PROCEDURE — 80053 COMPREHEN METABOLIC PANEL: CPT

## 2020-12-14 PROCEDURE — 77386 HC IMRT COMPLEX: CPT | Performed by: RADIOLOGY

## 2020-12-14 PROCEDURE — 36591 DRAW BLOOD OFF VENOUS DEVICE: CPT

## 2020-12-14 NOTE — PROGRESS NOTES
[facility] Radiation Treatment Management Note 11-15    Patient:  Michelle Bauman  Age:  56year old  Visit Diagnosis:  T2 N0 anal CA  Primary Rad/Onc:  Dr. Sarah Sarkar    Site Delivered Dose (cGy) Prescribed Dose (cGy) Fraction #   Pelv

## 2020-12-14 NOTE — TELEPHONE ENCOUNTER
Spoke with patient. Appointment scheduled with patient. Pedro Ayala MD  P Edw Bclibrado Lindquist Rns             Results reviewed. WBC and Platelets are down as expected with treatment. I ordered G-CSF. Can she come in tomorrow after radiation?  Thanks

## 2020-12-15 ENCOUNTER — OFFICE VISIT (OUTPATIENT)
Dept: HEMATOLOGY/ONCOLOGY | Facility: HOSPITAL | Age: 60
End: 2020-12-15
Attending: CLINICAL NURSE SPECIALIST
Payer: COMMERCIAL

## 2020-12-15 VITALS
BODY MASS INDEX: 23.99 KG/M2 | WEIGHT: 138.81 LBS | TEMPERATURE: 97 F | DIASTOLIC BLOOD PRESSURE: 74 MMHG | SYSTOLIC BLOOD PRESSURE: 121 MMHG | RESPIRATION RATE: 18 BRPM | HEIGHT: 63.86 IN | OXYGEN SATURATION: 98 % | HEART RATE: 72 BPM

## 2020-12-15 DIAGNOSIS — C21.0 ADENOSQUAMOUS CARCINOMA OF ANUS (HCC): Primary | ICD-10-CM

## 2020-12-15 PROCEDURE — 96372 THER/PROPH/DIAG INJ SC/IM: CPT

## 2020-12-15 PROCEDURE — 77386 HC IMRT COMPLEX: CPT | Performed by: RADIOLOGY

## 2020-12-15 NOTE — PROGRESS NOTES
Education Record    Learner:  Patient    Disease / Diagnosis: Adenosquamous carcinoma of anus    Barriers / Limitations:  None   Comments:    Method:  Discussion   Comments:    General Topics:  Medication, Pain, Procedure, Side effects and symptom manageme

## 2020-12-15 NOTE — PROGRESS NOTES
Nutrition F/U Consultation     Patient Name: Hyacinth Covarrubias  YOB: 1960  Medical Record Number: [id number]      Account Number: [de-identified]  Dietitian: Trinity Lennon RD, LDN        Date of visit: 12/15/2020     Diet Rx: high protein/calorie, lo IBW: 120 +/- 10%     WT HX:   12/15/20: 138 lbs 12.8 oz   11/30/20: 136 lbs  10/08/20: 135 lbs 8 oz     Estimated Nutrition Needs: 30-35 kcals/kg = 5412-5337 KCALS/d; 1.5 gms protein/kg = 93 gms/d     Services Provided: Verbal ix and samples re: use of Ban

## 2020-12-16 PROCEDURE — 77386 HC IMRT COMPLEX: CPT | Performed by: RADIOLOGY

## 2020-12-17 PROCEDURE — 77386 HC IMRT COMPLEX: CPT | Performed by: RADIOLOGY

## 2020-12-17 PROCEDURE — 77300 RADIATION THERAPY DOSE PLAN: CPT | Performed by: RADIOLOGY

## 2020-12-17 PROCEDURE — 77338 DESIGN MLC DEVICE FOR IMRT: CPT | Performed by: RADIOLOGY

## 2020-12-18 PROCEDURE — 77336 RADIATION PHYSICS CONSULT: CPT | Performed by: RADIOLOGY

## 2020-12-18 PROCEDURE — 77386 HC IMRT COMPLEX: CPT | Performed by: RADIOLOGY

## 2020-12-21 ENCOUNTER — HOSPITAL ENCOUNTER (OUTPATIENT)
Dept: RADIATION ONCOLOGY | Facility: HOSPITAL | Age: 60
Discharge: HOME | End: 2020-12-21
Attending: RADIOLOGY
Payer: COMMERCIAL

## 2020-12-21 ENCOUNTER — NURSE ONLY (OUTPATIENT)
Dept: HEMATOLOGY/ONCOLOGY | Facility: HOSPITAL | Age: 60
End: 2020-12-21
Attending: INTERNAL MEDICINE
Payer: COMMERCIAL

## 2020-12-21 VITALS
BODY MASS INDEX: 23 KG/M2 | HEART RATE: 78 BPM | SYSTOLIC BLOOD PRESSURE: 118 MMHG | OXYGEN SATURATION: 98 % | RESPIRATION RATE: 18 BRPM | WEIGHT: 135.63 LBS | TEMPERATURE: 98 F | DIASTOLIC BLOOD PRESSURE: 74 MMHG

## 2020-12-21 DIAGNOSIS — C21.0 ADENOSQUAMOUS CARCINOMA OF ANUS (HCC): Primary | ICD-10-CM

## 2020-12-21 DIAGNOSIS — C21.1 PRIMARY CANCER OF ANAL CANAL (HCC): Primary | ICD-10-CM

## 2020-12-21 PROCEDURE — 80053 COMPREHEN METABOLIC PANEL: CPT

## 2020-12-21 PROCEDURE — 36591 DRAW BLOOD OFF VENOUS DEVICE: CPT

## 2020-12-21 PROCEDURE — 85027 COMPLETE CBC AUTOMATED: CPT

## 2020-12-21 PROCEDURE — 85025 COMPLETE CBC W/AUTO DIFF WBC: CPT

## 2020-12-21 PROCEDURE — 82378 CARCINOEMBRYONIC ANTIGEN: CPT

## 2020-12-21 PROCEDURE — 85007 BL SMEAR W/DIFF WBC COUNT: CPT

## 2020-12-21 PROCEDURE — 77386 HC IMRT COMPLEX: CPT | Performed by: RADIOLOGY

## 2020-12-21 NOTE — PROGRESS NOTES
[facility] Radiation Treatment Management Note 16-20    Patient:  Louis Vila  Age:  56year old  Visit Diagnosis:  T2 N0 anal CA  Primary Rad/Onc:  Dr. Kathy Carr    Site Delivered Dose (cGy) Prescribed Dose (cGy) Fraction #   Pelv

## 2020-12-22 ENCOUNTER — TELEPHONE (OUTPATIENT)
Dept: HEMATOLOGY/ONCOLOGY | Facility: HOSPITAL | Age: 60
End: 2020-12-22

## 2020-12-22 DIAGNOSIS — C21.0 ADENOSQUAMOUS CARCINOMA OF ANUS (HCC): Primary | ICD-10-CM

## 2020-12-22 PROCEDURE — 77386 HC IMRT COMPLEX: CPT | Performed by: RADIOLOGY

## 2020-12-22 RX ORDER — FLUCONAZOLE 100 MG/1
TABLET ORAL
Qty: 8 TABLET | Refills: 0 | Status: SHIPPED | OUTPATIENT
Start: 2020-12-22 | End: 2021-05-19 | Stop reason: ALTCHOICE

## 2020-12-22 NOTE — TELEPHONE ENCOUNTER
Spoke with patient. Dietician updated MD that thrush has not resolved with nystatin. MD sent order for diflucan. Instructions provided to patient. She verbalized understanding.

## 2020-12-23 PROCEDURE — 77386 HC IMRT COMPLEX: CPT | Performed by: RADIOLOGY

## 2020-12-24 PROCEDURE — 77386 HC IMRT COMPLEX: CPT | Performed by: RADIOLOGY

## 2020-12-24 PROCEDURE — 77336 RADIATION PHYSICS CONSULT: CPT | Performed by: RADIOLOGY

## 2020-12-28 ENCOUNTER — HOSPITAL ENCOUNTER (OUTPATIENT)
Dept: RADIATION ONCOLOGY | Facility: HOSPITAL | Age: 60
Discharge: HOME | End: 2020-12-28
Attending: RADIOLOGY
Payer: COMMERCIAL

## 2020-12-28 ENCOUNTER — OFFICE VISIT (OUTPATIENT)
Dept: HEMATOLOGY/ONCOLOGY | Facility: HOSPITAL | Age: 60
End: 2020-12-28
Attending: CLINICAL NURSE SPECIALIST
Payer: COMMERCIAL

## 2020-12-28 VITALS
BODY MASS INDEX: 23.22 KG/M2 | SYSTOLIC BLOOD PRESSURE: 117 MMHG | OXYGEN SATURATION: 96 % | TEMPERATURE: 97 F | WEIGHT: 134.38 LBS | RESPIRATION RATE: 18 BRPM | DIASTOLIC BLOOD PRESSURE: 75 MMHG | HEIGHT: 63.86 IN | HEART RATE: 77 BPM

## 2020-12-28 VITALS
OXYGEN SATURATION: 98 % | DIASTOLIC BLOOD PRESSURE: 72 MMHG | HEART RATE: 88 BPM | BODY MASS INDEX: 23 KG/M2 | RESPIRATION RATE: 16 BRPM | TEMPERATURE: 98 F | WEIGHT: 133.81 LBS | SYSTOLIC BLOOD PRESSURE: 107 MMHG

## 2020-12-28 DIAGNOSIS — C21.1 PRIMARY CANCER OF ANAL CANAL (HCC): Primary | ICD-10-CM

## 2020-12-28 DIAGNOSIS — C21.0 ADENOSQUAMOUS CARCINOMA OF ANUS (HCC): Primary | ICD-10-CM

## 2020-12-28 PROCEDURE — 85025 COMPLETE CBC W/AUTO DIFF WBC: CPT

## 2020-12-28 PROCEDURE — 96409 CHEMO IV PUSH SNGL DRUG: CPT

## 2020-12-28 PROCEDURE — 99214 OFFICE O/P EST MOD 30 MIN: CPT | Performed by: NURSE PRACTITIONER

## 2020-12-28 PROCEDURE — 77300 RADIATION THERAPY DOSE PLAN: CPT | Performed by: RADIOLOGY

## 2020-12-28 PROCEDURE — 77338 DESIGN MLC DEVICE FOR IMRT: CPT | Performed by: RADIOLOGY

## 2020-12-28 PROCEDURE — 80053 COMPREHEN METABOLIC PANEL: CPT

## 2020-12-28 PROCEDURE — 96375 TX/PRO/DX INJ NEW DRUG ADDON: CPT

## 2020-12-28 PROCEDURE — 77386 HC IMRT COMPLEX: CPT | Performed by: RADIOLOGY

## 2020-12-28 RX ORDER — FLUOROURACIL 50 MG/ML
4000 INJECTION, SOLUTION INTRAVENOUS CONTINUOUS
Status: DISCONTINUED | OUTPATIENT
Start: 2020-12-28 | End: 2020-12-28

## 2020-12-28 RX ADMIN — FLUOROURACIL 6650 MG: 50 INJECTION, SOLUTION INTRAVENOUS at 11:18:00

## 2020-12-28 NOTE — PROGRESS NOTES
Pt here for C1D29.   Arrives Ambulating independently, accompanied by Self           Patient reports possible pregnancy since last therapy cycle: No    Modifications in dose or schedule: No     Frequency of blood return and site check throughout administrat

## 2020-12-28 NOTE — PROGRESS NOTES
[facility] Radiation Treatment Management Note 16-20    Patient:  Sonal Dumont  Age:  56year old  Visit Diagnosis:  T2 N0 anal CA  Primary Rad/Onc:  Dr. Bonnie Catalan    Site Delivered Dose (cGy) Prescribed Dose (cGy) Fraction #   Pelv reviewed: Yes    Assessment/Plan:  -Tolerating treatment, continue CRT per plan. -Chemo today, labs per medical oncology.     1) Oral thrush - resolved, finishes diflucan today    2) Dermatitis  -continue Aloevesta to skin  -avoid baby wipes if possible,

## 2020-12-28 NOTE — PROGRESS NOTES
Patient presents with: Follow - Up: APN assessment prior to treatment    Pt is here for treatment; C1 D29 5FU/mitomycin with concurrent RT. Pt states she is \"not too bad\"; feels achy at times and feels very drained.  Eating and drinking without issue; de

## 2020-12-29 ENCOUNTER — HOSPITAL ENCOUNTER (OUTPATIENT)
Dept: RADIATION ONCOLOGY | Facility: HOSPITAL | Age: 60
Discharge: HOME | End: 2020-12-29
Attending: RADIOLOGY
Payer: COMMERCIAL

## 2020-12-29 PROCEDURE — 77386 HC IMRT COMPLEX: CPT | Performed by: RADIOLOGY

## 2020-12-30 PROCEDURE — 77386 HC IMRT COMPLEX: CPT | Performed by: RADIOLOGY

## 2020-12-31 PROCEDURE — 77386 HC IMRT COMPLEX: CPT | Performed by: RADIOLOGY

## 2020-12-31 PROCEDURE — 77336 RADIATION PHYSICS CONSULT: CPT | Performed by: RADIOLOGY

## 2021-01-01 ENCOUNTER — HOSPITAL ENCOUNTER (OUTPATIENT)
Dept: RADIATION ONCOLOGY | Facility: HOSPITAL | Age: 61
Discharge: HOME | End: 2021-01-01
Attending: RADIOLOGY
Payer: COMMERCIAL

## 2021-01-01 ENCOUNTER — NURSE ONLY (OUTPATIENT)
Dept: HEMATOLOGY/ONCOLOGY | Facility: HOSPITAL | Age: 61
End: 2021-01-01
Attending: CLINICAL NURSE SPECIALIST
Payer: COMMERCIAL

## 2021-01-01 PROCEDURE — 96523 IRRIG DRUG DELIVERY DEVICE: CPT

## 2021-01-04 ENCOUNTER — HOSPITAL ENCOUNTER (OUTPATIENT)
Dept: RADIATION ONCOLOGY | Facility: HOSPITAL | Age: 61
Discharge: HOME | End: 2021-01-04
Attending: RADIOLOGY
Payer: COMMERCIAL

## 2021-01-04 VITALS
TEMPERATURE: 98 F | WEIGHT: 132.38 LBS | HEART RATE: 95 BPM | SYSTOLIC BLOOD PRESSURE: 108 MMHG | RESPIRATION RATE: 16 BRPM | OXYGEN SATURATION: 98 % | DIASTOLIC BLOOD PRESSURE: 70 MMHG | BODY MASS INDEX: 23 KG/M2

## 2021-01-04 DIAGNOSIS — C21.1 PRIMARY CANCER OF ANAL CANAL (HCC): Primary | ICD-10-CM

## 2021-01-04 DIAGNOSIS — C21.0 ADENOSQUAMOUS CARCINOMA OF ANUS (HCC): ICD-10-CM

## 2021-01-04 PROCEDURE — 77386 HC IMRT COMPLEX: CPT | Performed by: RADIOLOGY

## 2021-01-04 RX ORDER — HYDROCODONE BITARTRATE AND ACETAMINOPHEN 5; 325 MG/1; MG/1
1-2 TABLET ORAL EVERY 4 HOURS PRN
Qty: 50 TABLET | Refills: 0 | Status: SHIPPED | OUTPATIENT
Start: 2021-01-04 | End: 2021-05-19 | Stop reason: ALTCHOICE

## 2021-01-04 NOTE — PROGRESS NOTES
THE [facility] Hematology and Oncology Clinic Note    Diagnosis: cT2 N0 MX Anal SCC    Treatment History:  1.  ChemoRT with 5-FU 4000 mg/m2 D1-4 and D29-32 + MMC 10 mg/m2 on D1 and D29    Visit Diagnosis:  Adenosquamous carcinoma of anus (HCC)  (primary encounter d fear of being addicted. Interval History:   1/5/21: CC follow up for cT2 N0 Mx Anal SCCa. She had C1D29 of 5-FU + [address] on 12/28/20. CBC from 12/28/20 showed a Hb of 11, WBC 4.5 and Plt 205. Her CEA normalized to 0.5. She will finish RT on 1/8/20.  Sh , Rfl:     •  zoledronic acid (RECLAST) 5 MG/100ML Intravenous Solution, infuse 5 mg IVPB over 20 minutes once yearly, Disp: 100 mL, Rfl: 0    •  Melatonin 5 MG Oral Tab, Take 5 mg by mouth nightly.  , Disp: , Rfl:     •  [] nystatin 601557 UNIT/ML 9/24/2020    Performed by Yuri Montemayor MD at [address] [place] [address], POSSIBLE BIOPSY, POSSIBLE POLYPECTOMY 10924 N/A 5/5/2015    Performed by Merlin Jordan MD at [address] [place] [address]  5/5/15    2 splenic control/protection: Hysterectomy     Family History   Problem Relation Age of Onset   • Heart Disorder Father 37        CABG x 11, had stents later in life   • Diabetes Father    • Breast Cancer Mother 50   • Other (osteoporosis) Mother    • Cancer Paternal HCT 28.6 (L) 01/05/2021    MCV 92.3 01/05/2021    .0 (L) 01/05/2021    EOSABS 0.00 12/21/2020     Lab Results   Component Value Date     01/05/2021    K 3.5 01/05/2021    CO2 23.0 01/05/2021     01/05/2021    BUN 10 01/05/2021    ALB

## 2021-01-04 NOTE — PROGRESS NOTES
[facility] Radiation Treatment Management Note 21-25    Patient:  Lynette Ruiz  Age:  56year old  Visit Diagnosis:  T2 N0 anal CA  Primary Rad/Onc:  Dr. Madelaine Zamora    Site Delivered Dose (cGy) Prescribed Dose (cGy) Fraction #   Pelv have been reviewed: Yes    Assessment/Plan:  -Tolerating treatment, continue CRT per plan. -Chemo today, labs per medical oncology.     1) Dermatitis  -continue Aloevesta to skin  -avoid baby wipes if possible, rinse with water  -peribottle to dilute urin

## 2021-01-05 ENCOUNTER — OFFICE VISIT (OUTPATIENT)
Dept: HEMATOLOGY/ONCOLOGY | Facility: HOSPITAL | Age: 61
End: 2021-01-05
Attending: CLINICAL NURSE SPECIALIST
Payer: COMMERCIAL

## 2021-01-05 VITALS
TEMPERATURE: 97 F | HEIGHT: 63.86 IN | BODY MASS INDEX: 22.81 KG/M2 | DIASTOLIC BLOOD PRESSURE: 70 MMHG | RESPIRATION RATE: 16 BRPM | SYSTOLIC BLOOD PRESSURE: 112 MMHG | OXYGEN SATURATION: 96 % | WEIGHT: 132 LBS | HEART RATE: 106 BPM

## 2021-01-05 DIAGNOSIS — C21.0 ADENOSQUAMOUS CARCINOMA OF ANUS (HCC): Primary | ICD-10-CM

## 2021-01-05 DIAGNOSIS — D61.818 PANCYTOPENIA (HCC): ICD-10-CM

## 2021-01-05 LAB
ALBUMIN SERPL-MCNC: 3 G/DL (ref 3.4–5)
ALBUMIN/GLOB SERPL: 0.9 {RATIO} (ref 1–2)
ALP LIVER SERPL-CCNC: 78 U/L
ALT SERPL-CCNC: 28 U/L
ANION GAP SERPL CALC-SCNC: 10 MMOL/L (ref 0–18)
AST SERPL-CCNC: 21 U/L (ref 15–37)
BASOPHILS # BLD AUTO: 0.02 X10(3) UL (ref 0–0.2)
BASOPHILS NFR BLD AUTO: 1.2 %
BILIRUB SERPL-MCNC: 0.5 MG/DL (ref 0.1–2)
BUN BLD-MCNC: 10 MG/DL (ref 7–18)
BUN/CREAT SERPL: 15.4 (ref 10–20)
CALCIUM BLD-MCNC: 8.4 MG/DL (ref 8.5–10.1)
CHLORIDE SERPL-SCNC: 103 MMOL/L (ref 98–112)
CO2 SERPL-SCNC: 23 MMOL/L (ref 21–32)
CREAT BLD-MCNC: 0.65 MG/DL
DEPRECATED RDW RBC AUTO: 49.1 FL (ref 35.1–46.3)
EOSINOPHIL # BLD AUTO: 0.03 X10(3) UL (ref 0–0.7)
EOSINOPHIL NFR BLD AUTO: 1.8 %
ERYTHROCYTE [DISTWIDTH] IN BLOOD BY AUTOMATED COUNT: 14.7 % (ref 11–15)
GLOBULIN PLAS-MCNC: 3.2 G/DL (ref 2.8–4.4)
GLUCOSE BLD-MCNC: 91 MG/DL (ref 70–99)
HCT VFR BLD AUTO: 28.6 %
HGB BLD-MCNC: 9.6 G/DL
IMM GRANULOCYTES # BLD AUTO: 0.02 X10(3) UL (ref 0–1)
IMM GRANULOCYTES NFR BLD: 1.2 %
LYMPHOCYTES # BLD AUTO: 0.13 X10(3) UL (ref 1–4)
LYMPHOCYTES NFR BLD AUTO: 7.7 %
M PROTEIN MFR SERPL ELPH: 6.2 G/DL (ref 6.4–8.2)
MCH RBC QN AUTO: 31 PG (ref 26–34)
MCHC RBC AUTO-ENTMCNC: 33.6 G/DL (ref 31–37)
MCV RBC AUTO: 92.3 FL
MONOCYTES # BLD AUTO: 0.04 X10(3) UL (ref 0.1–1)
MONOCYTES NFR BLD AUTO: 2.4 %
NEUTROPHILS # BLD AUTO: 1.45 X10 (3) UL (ref 1.5–7.7)
NEUTROPHILS # BLD AUTO: 1.45 X10(3) UL (ref 1.5–7.7)
NEUTROPHILS NFR BLD AUTO: 85.7 %
OSMOLALITY SERPL CALC.SUM OF ELEC: 281 MOSM/KG (ref 275–295)
PLATELET # BLD AUTO: 110 10(3)UL (ref 150–450)
POTASSIUM SERPL-SCNC: 3.5 MMOL/L (ref 3.5–5.1)
RBC # BLD AUTO: 3.1 X10(6)UL
SODIUM SERPL-SCNC: 136 MMOL/L (ref 136–145)
WBC # BLD AUTO: 1.7 X10(3) UL (ref 4–11)

## 2021-01-05 PROCEDURE — 99214 OFFICE O/P EST MOD 30 MIN: CPT | Performed by: INTERNAL MEDICINE

## 2021-01-05 PROCEDURE — 36591 DRAW BLOOD OFF VENOUS DEVICE: CPT

## 2021-01-05 PROCEDURE — 77386 HC IMRT COMPLEX: CPT | Performed by: RADIOLOGY

## 2021-01-05 RX ORDER — SODIUM CHLORIDE 0.9 % (FLUSH) 0.9 %
10 SYRINGE (ML) INJECTION ONCE
Status: COMPLETED | OUTPATIENT
Start: 2021-01-05 | End: 2021-01-05

## 2021-01-05 RX ORDER — SODIUM CHLORIDE 0.9 % (FLUSH) 0.9 %
10 SYRINGE (ML) INJECTION ONCE
OUTPATIENT
Start: 2021-01-05

## 2021-01-05 RX ADMIN — SODIUM CHLORIDE 0.9 % (FLUSH) 10 ML: 0.9 % SYRINGE (ML) INJECTION at 08:54:00

## 2021-01-06 ENCOUNTER — HOSPITAL ENCOUNTER (OUTPATIENT)
Dept: RADIATION ONCOLOGY | Facility: HOSPITAL | Age: 61
Discharge: HOME | End: 2021-01-06
Attending: RADIOLOGY
Payer: COMMERCIAL

## 2021-01-06 PROCEDURE — 77386 HC IMRT COMPLEX: CPT | Performed by: RADIOLOGY

## 2021-01-07 PROCEDURE — 77386 HC IMRT COMPLEX: CPT | Performed by: RADIOLOGY

## 2021-01-08 ENCOUNTER — DOCUMENTATION ONLY (OUTPATIENT)
Dept: RADIATION ONCOLOGY | Facility: HOSPITAL | Age: 61
End: 2021-01-08

## 2021-01-08 PROCEDURE — 77386 HC IMRT COMPLEX: CPT | Performed by: RADIOLOGY

## 2021-01-08 PROCEDURE — 77336 RADIATION PHYSICS CONSULT: CPT | Performed by: RADIOLOGY

## 2021-01-08 NOTE — PATIENT INSTRUCTIONS
POST-RADIATION INSTRUCTIONS:   - CALL [phone] FOR A FOLLOW-UP WITH DR. HARPER 1 MONTH AFTER RADIATION COMPLETION  - FOLLOW-UP WITH DR. Pj William 8-12 WEEKS AFTER TREATMENT COMPLETION  - FOLLOW-UP WITH DR. Annie Burns AS DIRECTED  - SIDE EFFECTS OF RADIATION MANOHAR

## 2021-01-11 NOTE — PROGRESS NOTES
[facility] RADIATION ONCOLOGY  TREATMENT SUMMARY     PATIENT:  Mitzi Manuel MD: Nancy Anthony MD  DIAGNOSIS:  T2 N0 squamous cell carcinoma of the anal canal    HISTORY   13-year-old woman presented with lower GI symptoms with bleedin

## 2021-01-20 ENCOUNTER — TELEPHONE (OUTPATIENT)
Dept: RADIATION ONCOLOGY | Facility: HOSPITAL | Age: 61
End: 2021-01-20

## 2021-01-20 NOTE — TELEPHONE ENCOUNTER
Patient called and said that she is experiencing constipation and was looking for some guidance as to what she should do.

## 2021-01-20 NOTE — TELEPHONE ENCOUNTER
Pt called, c/o constipation for 1 week. States no relief from Miralax, Dulcolax and Colace. States \"very uncomfortable\" due to unable to move bowels.  Recommended Mag Citrate, instructed on use and to call back in AM. Pt verbalized understanding, was than

## 2021-04-28 ENCOUNTER — TELEPHONE (OUTPATIENT)
Dept: HEMATOLOGY/ONCOLOGY | Facility: HOSPITAL | Age: 61
End: 2021-04-28

## 2021-04-28 DIAGNOSIS — C21.0 ADENOSQUAMOUS CARCINOMA OF ANUS (HCC): Primary | ICD-10-CM

## 2021-04-28 RX ORDER — PREDNISONE 50 MG/1
50 TABLET ORAL AS DIRECTED
Qty: 3 TABLET | Refills: 0 | Status: SHIPPED | OUTPATIENT
Start: 2021-04-28 | End: 2021-05-19 | Stop reason: ALTCHOICE

## 2021-04-28 NOTE — TELEPHONE ENCOUNTER
Left detailed message for patient explaining that the insurance would not cover PET, therefore we will need to proceed with CT and MRI per Dr Janae Tobar. Orders entered.  She does have an allergy to contrast, therefore prescription for prednisone sent to [facility]

## 2021-05-04 ENCOUNTER — TELEPHONE (OUTPATIENT)
Dept: HEMATOLOGY/ONCOLOGY | Facility: HOSPITAL | Age: 61
End: 2021-05-04

## 2021-05-04 NOTE — TELEPHONE ENCOUNTER
Yoandy Merino from [address] from [facility] called and needs clarification regarding patient's CT scan scheduled for 5/6/21. She needs to speak  with Valentin Richard.  Walker Gonzalez from [facility] Is: [phone]

## 2021-05-06 ENCOUNTER — HOSPITAL ENCOUNTER (OUTPATIENT)
Dept: CT IMAGING | Facility: HOSPITAL | Age: 61
Discharge: HOME | End: 2021-05-06
Attending: NURSE PRACTITIONER
Payer: COMMERCIAL

## 2021-05-06 DIAGNOSIS — C21.0 ADENOSQUAMOUS CARCINOMA OF ANUS (HCC): ICD-10-CM

## 2021-05-06 PROCEDURE — 82565 ASSAY OF CREATININE: CPT

## 2021-05-06 PROCEDURE — 71260 CT THORAX DX C+: CPT | Performed by: NURSE PRACTITIONER

## 2021-05-06 PROCEDURE — 74160 CT ABDOMEN W/CONTRAST: CPT | Performed by: NURSE PRACTITIONER

## 2021-05-07 ENCOUNTER — HOSPITAL ENCOUNTER (OUTPATIENT)
Dept: MRI IMAGING | Facility: HOSPITAL | Age: 61
Discharge: HOME | End: 2021-05-07
Attending: NURSE PRACTITIONER
Payer: COMMERCIAL

## 2021-05-07 DIAGNOSIS — C21.0 ADENOSQUAMOUS CARCINOMA OF ANUS (HCC): ICD-10-CM

## 2021-05-07 PROCEDURE — A9575 INJ GADOTERATE MEGLUMI 0.1ML: HCPCS | Performed by: NURSE PRACTITIONER

## 2021-05-07 PROCEDURE — 72197 MRI PELVIS W/O & W/DYE: CPT | Performed by: NURSE PRACTITIONER

## 2021-05-07 RX ORDER — TRAZODONE HYDROCHLORIDE 50 MG/1
50 TABLET ORAL NIGHTLY
Qty: 90 TABLET | Refills: 1 | Status: SHIPPED | OUTPATIENT
Start: 2021-05-07 | End: 2021-10-29

## 2021-05-10 ENCOUNTER — TELEPHONE (OUTPATIENT)
Dept: HEMATOLOGY/ONCOLOGY | Facility: HOSPITAL | Age: 61
End: 2021-05-10

## 2021-05-10 NOTE — TELEPHONE ENCOUNTER
BEAU for patient requesting call back. Contact information provided. Joy Navas MD  P Edw Quan Lindquist Rns  Results reviewed. MRI Looks good. She has post-surgical and post radiation changes. I will ask Dr. Wing Julien and Dr. Jah Dunbar to take a look also.  I n

## 2021-05-18 NOTE — PROGRESS NOTES
[facility] Note    Diagnosis: cT2 N0 MX Anal SCC    Treatment History:  1.  ChemoRT with 5-FU 4000 mg/m2 D1-4 and D29-32 + MMC 10 mg/m2 on D1 and D29: 11/30/20 to 01/08/21    Visit Diagnosis:  Adenosquamous carcinoma of anus ([facility] [address] she needs to follow up with Dr. Amie Sanchez. Colonoscopy in 2015 with polyps. Pap was normal in 2018. Paternal grandfather with colon cancer. Mother with breast cancer. No smoking or etoh abuse. She is having issues with both neck and anal pain.  She has not Procedure Laterality Date   • BREAST SURGERY PROCEDURE UNLISTED  1998, 2006    Breast Enlargement, Fibroid?    • COLONOSCOPY N/A 9/24/2020    Procedure: COLONOSCOPY;  Surgeon: Coleen Tejeda MD;  Location: [facility] ENDOSCOPY   • COLONOSCOPY,BIOPSY N/A 5 Disorder Father 37        CABG x 5, had stents later in life   • Diabetes Father    • Breast Cancer Mother 50   • Other (osteoporosis) Mother    • Cancer Paternal Grandmother         breast cancer, colon cancer   • Diabetes Paternal Grandmother    • Breast BUN 10 01/05/2021    ALB 3.0 (L) 01/05/2021       Radiology: I personally reviewed the imaging    Pathology: reviewed     Assessment and Plan:    Assessment:   · Anal Squamous Cell Ca: cT2 N0 Mx, p16 positive  · Focal component of adenosquamous   · Hematoc

## 2021-05-19 ENCOUNTER — TELEPHONE (OUTPATIENT)
Dept: HEMATOLOGY/ONCOLOGY | Facility: HOSPITAL | Age: 61
End: 2021-05-19

## 2021-05-19 ENCOUNTER — OFFICE VISIT (OUTPATIENT)
Dept: HEMATOLOGY/ONCOLOGY | Facility: HOSPITAL | Age: 61
End: 2021-05-19
Attending: INTERNAL MEDICINE
Payer: COMMERCIAL

## 2021-05-19 VITALS
HEART RATE: 90 BPM | RESPIRATION RATE: 16 BRPM | WEIGHT: 132 LBS | TEMPERATURE: 97 F | BODY MASS INDEX: 22.81 KG/M2 | OXYGEN SATURATION: 100 % | DIASTOLIC BLOOD PRESSURE: 76 MMHG | HEIGHT: 63.86 IN | SYSTOLIC BLOOD PRESSURE: 120 MMHG

## 2021-05-19 DIAGNOSIS — C21.0 ADENOSQUAMOUS CARCINOMA OF ANUS (HCC): Primary | ICD-10-CM

## 2021-05-19 PROCEDURE — 99214 OFFICE O/P EST MOD 30 MIN: CPT | Performed by: INTERNAL MEDICINE

## 2021-05-19 RX ORDER — ERGOCALCIFEROL 1.25 MG/1
50000 CAPSULE ORAL WEEKLY
Qty: 4 CAPSULE | Refills: 1 | Status: SHIPPED | OUTPATIENT
Start: 2021-05-19 | End: 2021-11-09

## 2021-05-19 NOTE — TELEPHONE ENCOUNTER
LVM with information. Contact number provided if patient has additional questions. Sukh Herzog MD  P Edw Bcn Lexa Rns  Results reviewed. Vit D levels are low.  Can you prescribe weekly Vit D. CEA is normal. Thanks

## 2021-05-19 NOTE — PROGRESS NOTES
Outpatient Oncology Care Plan   Problem list:   knowledge deficit   Problems related to:   side effect of treatment   Interventions:   provided general teaching   Expected outcomes:   understands plan of care   Progress towards outcome: making progress

## 2021-06-01 ENCOUNTER — OFFICE VISIT (OUTPATIENT)
Dept: SURGERY | Facility: CLINIC | Age: 61
End: 2021-06-01
Payer: COMMERCIAL

## 2021-06-01 VITALS
WEIGHT: 132.38 LBS | SYSTOLIC BLOOD PRESSURE: 118 MMHG | TEMPERATURE: 97 F | DIASTOLIC BLOOD PRESSURE: 88 MMHG | BODY MASS INDEX: 23 KG/M2 | HEART RATE: 90 BPM

## 2021-06-01 DIAGNOSIS — C21.0 ADENOSQUAMOUS CARCINOMA OF ANUS (HCC): ICD-10-CM

## 2021-06-01 DIAGNOSIS — C21.0 ANAL CANCER (HCC): Primary | ICD-10-CM

## 2021-06-01 PROCEDURE — 3079F DIAST BP 80-89 MM HG: CPT | Performed by: COLON & RECTAL SURGERY

## 2021-06-01 PROCEDURE — 46600 DIAGNOSTIC ANOSCOPY SPX: CPT | Performed by: COLON & RECTAL SURGERY

## 2021-06-01 PROCEDURE — 3074F SYST BP LT 130 MM HG: CPT | Performed by: COLON & RECTAL SURGERY

## 2021-06-01 PROCEDURE — 99213 OFFICE O/P EST LOW 20 MIN: CPT | Performed by: COLON & RECTAL SURGERY

## 2021-06-01 NOTE — PROGRESS NOTES
Office Visit Note       Active Problems  1. Anal cancer ([facility] [address]    2.  Adenosquamous carcinoma of anus [facility])         Chief Complaint   Patient presents with:  Anal Cancer         History of Present Illness   Usama Singh is a 56year old female who was foun COLONOSCOPY, POSSIBLE BIOPSY, POSSIBLE POLYPECTOMY 53721;  Surgeon: Hollie Wang MD;  Location: [address] [place] [address]  5/5/15    2 splenic flexure polyps, diverticulosis   • COLONOSCOPY,REMV CONCETTA,SNARE N/A 5/5/2015    P Maternal Aunt    • Breast Cancer Maternal Aunt         post menopausal   • Heart Disorder Maternal Aunt    • Breast Cancer Maternal Aunt         post menopausal   • Heart Disorder Maternal Aunt    • Breast Cancer Maternal Aunt         pre menopausal   • Ov of slight inflammation. No bleeding. There is no palpable inguinal lymphadenopathy.              Assessment   Anal cancer ([facility] [address]  (primary encounter diagnosis)  Adenosquamous carcinoma of anus [facility])      Plan   Patient is doing well status post chemoradi

## 2021-09-08 ENCOUNTER — OFFICE VISIT (OUTPATIENT)
Dept: SURGERY | Facility: CLINIC | Age: 61
End: 2021-09-08
Payer: COMMERCIAL

## 2021-09-08 VITALS
TEMPERATURE: 97 F | SYSTOLIC BLOOD PRESSURE: 127 MMHG | HEIGHT: 64 IN | BODY MASS INDEX: 22.2 KG/M2 | HEART RATE: 80 BPM | WEIGHT: 130 LBS | DIASTOLIC BLOOD PRESSURE: 77 MMHG

## 2021-09-08 DIAGNOSIS — C21.0 ANAL CANCER (HCC): Primary | ICD-10-CM

## 2021-09-08 DIAGNOSIS — Z95.828 PORT-A-CATH IN PLACE: ICD-10-CM

## 2021-09-08 DIAGNOSIS — C21.0 ADENOSQUAMOUS CARCINOMA OF ANUS (HCC): ICD-10-CM

## 2021-09-08 PROCEDURE — 3074F SYST BP LT 130 MM HG: CPT | Performed by: COLON & RECTAL SURGERY

## 2021-09-08 PROCEDURE — 3008F BODY MASS INDEX DOCD: CPT | Performed by: COLON & RECTAL SURGERY

## 2021-09-08 PROCEDURE — 99213 OFFICE O/P EST LOW 20 MIN: CPT | Performed by: COLON & RECTAL SURGERY

## 2021-09-08 PROCEDURE — 3078F DIAST BP <80 MM HG: CPT | Performed by: COLON & RECTAL SURGERY

## 2021-09-08 RX ORDER — POLYETHYLENE GLYCOL 3350, SODIUM CHLORIDE, SODIUM BICARBONATE, POTASSIUM CHLORIDE 420; 11.2; 5.72; 1.48 G/4L; G/4L; G/4L; G/4L
POWDER, FOR SOLUTION ORAL
Qty: 1 EACH | Refills: 0 | Status: SHIPPED | OUTPATIENT
Start: 2021-09-08

## 2021-09-08 NOTE — PROGRESS NOTES
Office Visit Note       Active Problems  1. Anal cancer ([facility] [address]    2.  Adenosquamous carcinoma of anus [facility])         Chief Complaint   Patient presents with:  Anal Problem         History of Present Illness   Yelena Gan is a 59year old female who was fou MD KOLTON;  [facility]: [facility]    has 1 ovary left   • OTHER  06/04/2018    CERVICAL 4 - CERVICAL 5, CERVICAL 5 - CERVICAL 6  ANTERIOR CERVICAL DISCECTOMY AND FUSION WITH INSTRUMENTATION, ALLOGRAFT AND  ALL INDICATED PROC premenopausal     Social History    Tobacco Use      Smoking status: Never Smoker      Smokeless tobacco: Never Used    Vaping Use      Vaping Use: Never used    Alcohol use: Not Currently    Drug use: No     ergocalciferol 1.25 MG (62478 UT) Oral C cancer and polyp removal prior to progression to cancer were discussed.  The details of the procedure which include navigation of the colonoscope through the anus, rectum, and colon to evaluate the mucosa and removal of any polyps encountered were discussed

## 2021-09-26 ENCOUNTER — LAB ENCOUNTER (OUTPATIENT)
Dept: LAB | Facility: HOSPITAL | Age: 61
End: 2021-09-26
Attending: COLON & RECTAL SURGERY
Payer: COMMERCIAL

## 2021-09-26 DIAGNOSIS — C21.0 ANAL CANCER (HCC): ICD-10-CM

## 2021-09-26 DIAGNOSIS — Z01.818 PRE-OP TESTING: ICD-10-CM

## 2021-09-26 LAB
ALBUMIN SERPL-MCNC: 3.6 G/DL (ref 3.4–5)
ALBUMIN/GLOB SERPL: 1.1 {RATIO} (ref 1–2)
ALP LIVER SERPL-CCNC: 62 U/L
ALT SERPL-CCNC: 23 U/L
ANION GAP SERPL CALC-SCNC: 6 MMOL/L (ref 0–18)
AST SERPL-CCNC: 20 U/L (ref 15–37)
BASOPHILS # BLD AUTO: 0.02 X10(3) UL (ref 0–0.2)
BASOPHILS NFR BLD AUTO: 0.7 %
BILIRUB SERPL-MCNC: 0.4 MG/DL (ref 0.1–2)
BUN BLD-MCNC: 17 MG/DL (ref 7–18)
CALCIUM BLD-MCNC: 9 MG/DL (ref 8.5–10.1)
CHLORIDE SERPL-SCNC: 107 MMOL/L (ref 98–112)
CO2 SERPL-SCNC: 26 MMOL/L (ref 21–32)
CREAT BLD-MCNC: 0.72 MG/DL
EOSINOPHIL # BLD AUTO: 0.13 X10(3) UL (ref 0–0.7)
EOSINOPHIL NFR BLD AUTO: 4.7 %
ERYTHROCYTE [DISTWIDTH] IN BLOOD BY AUTOMATED COUNT: 13 %
GLOBULIN PLAS-MCNC: 3.3 G/DL (ref 2.8–4.4)
GLUCOSE BLD-MCNC: 87 MG/DL (ref 70–99)
HCT VFR BLD AUTO: 38.7 %
HGB BLD-MCNC: 12.9 G/DL
IMM GRANULOCYTES # BLD AUTO: 0 X10(3) UL (ref 0–1)
IMM GRANULOCYTES NFR BLD: 0 %
LYMPHOCYTES # BLD AUTO: 0.76 X10(3) UL (ref 1–4)
LYMPHOCYTES NFR BLD AUTO: 27.2 %
MCH RBC QN AUTO: 31.5 PG (ref 26–34)
MCHC RBC AUTO-ENTMCNC: 33.3 G/DL (ref 31–37)
MCV RBC AUTO: 94.6 FL
MONOCYTES # BLD AUTO: 0.15 X10(3) UL (ref 0.1–1)
MONOCYTES NFR BLD AUTO: 5.4 %
NEUTROPHILS # BLD AUTO: 1.73 X10 (3) UL (ref 1.5–7.7)
NEUTROPHILS # BLD AUTO: 1.73 X10(3) UL (ref 1.5–7.7)
NEUTROPHILS NFR BLD AUTO: 62 %
OSMOLALITY SERPL CALC.SUM OF ELEC: 289 MOSM/KG (ref 275–295)
PLATELET # BLD AUTO: 185 10(3)UL (ref 150–450)
POTASSIUM SERPL-SCNC: 3.9 MMOL/L (ref 3.5–5.1)
PROT SERPL-MCNC: 6.9 G/DL (ref 6.4–8.2)
RBC # BLD AUTO: 4.09 X10(6)UL
SODIUM SERPL-SCNC: 139 MMOL/L (ref 136–145)
WBC # BLD AUTO: 2.8 X10(3) UL (ref 4–11)

## 2021-09-26 PROCEDURE — 80053 COMPREHEN METABOLIC PANEL: CPT

## 2021-09-26 PROCEDURE — 85025 COMPLETE CBC W/AUTO DIFF WBC: CPT

## 2021-09-26 PROCEDURE — 36415 COLL VENOUS BLD VENIPUNCTURE: CPT

## 2021-09-27 LAB — SARS-COV-2 RNA RESP QL NAA+PROBE: NOT DETECTED

## 2021-09-29 ENCOUNTER — ANESTHESIA EVENT (OUTPATIENT)
Dept: SURGERY | Facility: HOSPITAL | Age: 61
End: 2021-09-29
Payer: COMMERCIAL

## 2021-09-29 ENCOUNTER — TELEPHONE (OUTPATIENT)
Dept: HEMATOLOGY/ONCOLOGY | Facility: HOSPITAL | Age: 61
End: 2021-09-29

## 2021-09-29 ENCOUNTER — ANESTHESIA (OUTPATIENT)
Dept: SURGERY | Facility: HOSPITAL | Age: 61
End: 2021-09-29
Payer: COMMERCIAL

## 2021-09-29 ENCOUNTER — HOSPITAL ENCOUNTER (OUTPATIENT)
Facility: HOSPITAL | Age: 61
Setting detail: HOSPITAL OUTPATIENT SURGERY
Discharge: HOME | End: 2021-09-29
Attending: COLON & RECTAL SURGERY | Admitting: COLON & RECTAL SURGERY
Payer: COMMERCIAL

## 2021-09-29 VITALS
HEART RATE: 61 BPM | BODY MASS INDEX: 22.82 KG/M2 | HEIGHT: 64 IN | WEIGHT: 133.69 LBS | RESPIRATION RATE: 16 BRPM | DIASTOLIC BLOOD PRESSURE: 53 MMHG | OXYGEN SATURATION: 99 % | SYSTOLIC BLOOD PRESSURE: 106 MMHG | TEMPERATURE: 97 F

## 2021-09-29 DIAGNOSIS — Z01.818 PRE-OP TESTING: Primary | ICD-10-CM

## 2021-09-29 DIAGNOSIS — C21.0 ANAL CANCER (HCC): ICD-10-CM

## 2021-09-29 DIAGNOSIS — C21.0 ADENOSQUAMOUS CARCINOMA OF ANUS (HCC): Primary | ICD-10-CM

## 2021-09-29 DIAGNOSIS — Z95.828 PORT-A-CATH IN PLACE: ICD-10-CM

## 2021-09-29 PROCEDURE — 36590 REMOVAL TUNNELED CV CATH: CPT | Performed by: COLON & RECTAL SURGERY

## 2021-09-29 PROCEDURE — 0JPT0WZ REMOVAL OF TOTALLY IMPLANTABLE VASCULAR ACCESS DEVICE FROM TRUNK SUBCUTANEOUS TISSUE AND FASCIA, OPEN APPROACH: ICD-10-PCS | Performed by: COLON & RECTAL SURGERY

## 2021-09-29 RX ORDER — ACETAMINOPHEN 500 MG
1000 TABLET ORAL ONCE AS NEEDED
Status: DISCONTINUED | OUTPATIENT
Start: 2021-09-29 | End: 2021-09-29

## 2021-09-29 RX ORDER — SCOLOPAMINE TRANSDERMAL SYSTEM 1 MG/1
1 PATCH, EXTENDED RELEASE TRANSDERMAL ONCE
Status: DISCONTINUED | OUTPATIENT
Start: 2021-09-29 | End: 2021-09-29

## 2021-09-29 RX ORDER — NALOXONE HYDROCHLORIDE 0.4 MG/ML
80 INJECTION, SOLUTION INTRAMUSCULAR; INTRAVENOUS; SUBCUTANEOUS AS NEEDED
Status: DISCONTINUED | OUTPATIENT
Start: 2021-09-29 | End: 2021-09-29

## 2021-09-29 RX ORDER — HYDROCODONE BITARTRATE AND ACETAMINOPHEN 5; 325 MG/1; MG/1
2 TABLET ORAL AS NEEDED
Status: COMPLETED | OUTPATIENT
Start: 2021-09-29 | End: 2021-09-29

## 2021-09-29 RX ORDER — OXYCODONE HYDROCHLORIDE 5 MG/1
5 TABLET ORAL EVERY 6 HOURS PRN
Qty: 5 TABLET | Refills: 0 | Status: SHIPPED | OUTPATIENT
Start: 2021-09-29 | End: 2021-11-01 | Stop reason: ALTCHOICE

## 2021-09-29 RX ORDER — HYDROMORPHONE HYDROCHLORIDE 1 MG/ML
0.4 INJECTION, SOLUTION INTRAMUSCULAR; INTRAVENOUS; SUBCUTANEOUS EVERY 5 MIN PRN
Status: DISCONTINUED | OUTPATIENT
Start: 2021-09-29 | End: 2021-09-29

## 2021-09-29 RX ORDER — ONDANSETRON 2 MG/ML
4 INJECTION INTRAMUSCULAR; INTRAVENOUS AS NEEDED
Status: DISCONTINUED | OUTPATIENT
Start: 2021-09-29 | End: 2021-09-29

## 2021-09-29 RX ORDER — ACETAMINOPHEN 500 MG
1000 TABLET ORAL ONCE
Status: DISCONTINUED | OUTPATIENT
Start: 2021-09-29 | End: 2021-09-29 | Stop reason: HOSPADM

## 2021-09-29 RX ORDER — METOCLOPRAMIDE HYDROCHLORIDE 5 MG/ML
10 INJECTION INTRAMUSCULAR; INTRAVENOUS AS NEEDED
Status: DISCONTINUED | OUTPATIENT
Start: 2021-09-29 | End: 2021-09-29

## 2021-09-29 RX ORDER — DEXAMETHASONE SODIUM PHOSPHATE 4 MG/ML
VIAL (ML) INJECTION AS NEEDED
Status: DISCONTINUED | OUTPATIENT
Start: 2021-09-29 | End: 2021-09-29 | Stop reason: SURG

## 2021-09-29 RX ORDER — SODIUM CHLORIDE, SODIUM LACTATE, POTASSIUM CHLORIDE, CALCIUM CHLORIDE 600; 310; 30; 20 MG/100ML; MG/100ML; MG/100ML; MG/100ML
INJECTION, SOLUTION INTRAVENOUS CONTINUOUS
Status: DISCONTINUED | OUTPATIENT
Start: 2021-09-29 | End: 2021-09-29

## 2021-09-29 RX ORDER — CEFAZOLIN SODIUM/WATER 2 G/20 ML
2 SYRINGE (ML) INTRAVENOUS ONCE
Status: COMPLETED | OUTPATIENT
Start: 2021-09-29 | End: 2021-09-29

## 2021-09-29 RX ORDER — BUPIVACAINE HYDROCHLORIDE AND EPINEPHRINE 2.5; 5 MG/ML; UG/ML
INJECTION, SOLUTION EPIDURAL; INFILTRATION; INTRACAUDAL; PERINEURAL AS NEEDED
Status: DISCONTINUED | OUTPATIENT
Start: 2021-09-29 | End: 2021-09-29 | Stop reason: HOSPADM

## 2021-09-29 RX ORDER — HYDROCODONE BITARTRATE AND ACETAMINOPHEN 5; 325 MG/1; MG/1
1 TABLET ORAL AS NEEDED
Status: COMPLETED | OUTPATIENT
Start: 2021-09-29 | End: 2021-09-29

## 2021-09-29 RX ORDER — LIDOCAINE HYDROCHLORIDE 10 MG/ML
INJECTION, SOLUTION EPIDURAL; INFILTRATION; INTRACAUDAL; PERINEURAL AS NEEDED
Status: DISCONTINUED | OUTPATIENT
Start: 2021-09-29 | End: 2021-09-29 | Stop reason: SURG

## 2021-09-29 RX ORDER — LABETALOL HYDROCHLORIDE 5 MG/ML
5 INJECTION, SOLUTION INTRAVENOUS EVERY 5 MIN PRN
Status: DISCONTINUED | OUTPATIENT
Start: 2021-09-29 | End: 2021-09-29

## 2021-09-29 RX ORDER — MIDAZOLAM HYDROCHLORIDE 1 MG/ML
INJECTION INTRAMUSCULAR; INTRAVENOUS AS NEEDED
Status: DISCONTINUED | OUTPATIENT
Start: 2021-09-29 | End: 2021-09-29 | Stop reason: SURG

## 2021-09-29 RX ORDER — DIPHENHYDRAMINE HYDROCHLORIDE 50 MG/ML
12.5 INJECTION INTRAMUSCULAR; INTRAVENOUS AS NEEDED
Status: DISCONTINUED | OUTPATIENT
Start: 2021-09-29 | End: 2021-09-29

## 2021-09-29 RX ORDER — ONDANSETRON 2 MG/ML
INJECTION INTRAMUSCULAR; INTRAVENOUS AS NEEDED
Status: DISCONTINUED | OUTPATIENT
Start: 2021-09-29 | End: 2021-09-29 | Stop reason: SURG

## 2021-09-29 RX ORDER — MEPERIDINE HYDROCHLORIDE 25 MG/ML
12.5 INJECTION INTRAMUSCULAR; INTRAVENOUS; SUBCUTANEOUS AS NEEDED
Status: DISCONTINUED | OUTPATIENT
Start: 2021-09-29 | End: 2021-09-29

## 2021-09-29 RX ADMIN — CEFAZOLIN SODIUM/WATER 2 G: 2 G/20 ML SYRINGE (ML) INTRAVENOUS at 12:49:00

## 2021-09-29 RX ADMIN — MIDAZOLAM HYDROCHLORIDE 2 MG: 1 INJECTION INTRAMUSCULAR; INTRAVENOUS at 12:38:00

## 2021-09-29 RX ADMIN — ONDANSETRON 4 MG: 2 INJECTION INTRAMUSCULAR; INTRAVENOUS at 12:48:00

## 2021-09-29 RX ADMIN — LIDOCAINE HYDROCHLORIDE 50 MG: 10 INJECTION, SOLUTION EPIDURAL; INFILTRATION; INTRACAUDAL; PERINEURAL at 12:38:00

## 2021-09-29 RX ADMIN — SODIUM CHLORIDE, SODIUM LACTATE, POTASSIUM CHLORIDE, CALCIUM CHLORIDE: 600; 310; 30; 20 INJECTION, SOLUTION INTRAVENOUS at 12:36:00

## 2021-09-29 RX ADMIN — DEXAMETHASONE SODIUM PHOSPHATE 8 MG: 4 MG/ML VIAL (ML) INJECTION at 12:48:00

## 2021-09-29 NOTE — TELEPHONE ENCOUNTER
LVM for patient. Lab orders placed. Patient only needs CEA due to recently having CBC and CMP drawn this past week. Contact information provided if patient has additional questions or concerns.

## 2021-09-29 NOTE — H&P
See note below. The patient presents for port removal.    All questions answered. Proceed with surgery. Trinity Rosario MD  [facility]  9/29/2021  12:27 PM        Active Problems  1. Pre-op testing    2.  Anal cancer ([facility] [address]    3. Port-A-Cat Procedure: COLONOSCOPY, POSSIBLE BIOPSY, POSSIBLE POLYPECTOMY 06681;  Surgeon: Lexis Santo MD;  Location: [address] [place] [address]  5/5/15    2 splenic flexure polyps, diverticulosis   • COLONOSCOPY,REMV LESN,SNARE N/A 5/ Maternal Aunt         post menopausal   • Heart Disorder Maternal Aunt    • Breast Cancer Maternal Aunt         post menopausal   • Heart Disorder Maternal Aunt    • Breast Cancer Maternal Aunt         pre menopausal   • Ovarian Cancer Paternal Cousin Fema There was not anal fissure and anal fistula. There is a faint white scar involving the right aspect of the anal canal and the anal rectal junction. In the right posterior location is a very small area of slight inflammation. No bleeding.     There is

## 2021-09-29 NOTE — OPERATIVE REPORT
[facility]  Operative Note    Keyonna Bynum Location: OR   CSN [id number] MRN [id number]    1960 Age 59year old   Admission Date 2021 Operation Date 2021   Attending Physician Jeanette Moreira MD Operating Physician Tracy Dominguez did have a prominent capsule, and this was excised. The subcutaneous cavity was irrigated with saline. Hemostasis was achieved with electrocautery. The deep space was closed down with 3-0 Vicryl suture.   The dermis was approximated with 3-0 Vicryl sutur

## 2021-09-29 NOTE — ANESTHESIA POSTPROCEDURE EVALUATION
[facility] Patient Status:  Hospital Outpatient Surgery   Age/Gender 59year old female MRN [id number]   Location [address] [place] [address] Attending Lorie Johnson MD   Hosp Day # 0 PCP Stephanie Vallejo MD

## 2021-09-29 NOTE — ANESTHESIA PREPROCEDURE EVALUATION
PRE-OP EVALUATION    Patient Name: Pushpa Ann    Admit Diagnosis: Anal cancer ([facility] [C21.0]  Port-A-Cath in place [Z95.828]    Pre-op Diagnosis: Anal cancer ([facility] [C21.0]  Port-A-Cath in place [Z95.828]    PORT A CATH REMOVAL    Anesthesia Procedure: P cancer             Cardiovascular    Negative cardiovascular ROS. Exercise tolerance: good     MET: >4                             (-) angina     (-) STANLEY  (-) orthopnea  (-) PND     Endo/Other    Negative endo/other ROS.                               Pul 09/26/2021    MCH 31.5 09/26/2021    MCHC 33.3 09/26/2021    RDW 13.0 09/26/2021    .0 09/26/2021     Lab Results   Component Value Date     09/26/2021    K 3.9 09/26/2021     09/26/2021    CO2 26.0 09/26/2021    BUN 17 09/26/2021    CRE

## 2021-09-29 NOTE — TELEPHONE ENCOUNTER
Lesia Klein called. She would like to request lab orders as she is overdue for blood work. She would also like to speak with the nurse when she is available. Please call.

## 2021-10-22 NOTE — PHYSICAL THERAPY NOTE
Consult    Patient: Beverly Sheehan MRN: [id number]  SSN: [id number]    YOB: 1994  Age: 32 y.o. Sex: male      Subjective:      Beverly Sheehan is a 32 y.o. male who is being seen for medical evaluation for inpatient psychiatric admission denies any chest pain no shortness of breath no cough no chills patient was brought in for drinking excessive alcohol. Patient claims to have had some excess of alcohol which is unlike him    History reviewed. No pertinent past medical history. History reviewed. No pertinent surgical history. History reviewed. No pertinent family history. Social History     Tobacco Use    Smoking status: Current Every Day Smoker     Packs/day: 0.50    Smokeless tobacco: Never Used   Substance Use Topics    Alcohol use: Yes      Current Facility-Administered Medications   Medication Dose Route Frequency Provider Last Rate Last Admin    thiamine mononitrate (B-1) tablet 100 mg  100 mg Oral DAILY Fredy Fung MD        haloperidol lactate (HALDOL) injection 5 mg  5 mg IntraMUSCular Q6H PRN Fredy Fung MD        diphenhydrAMINE (BENADRYL) injection 50 mg  50 mg IntraMUSCular BID PRN Fredy Fung MD        hydrOXYzine HCL (ATARAX) tablet 50 mg  50 mg Oral TID PRN Christine Monson MD        LORazepam (ATIVAN) injection 1 mg  1 mg IntraMUSCular Q4H PRN Fredy Fung MD        traZODone (DESYREL) tablet 50 mg  50 mg Oral QHS PRN Christine Monson MD        acetaminophen (TYLENOL) tablet 650 mg  650 mg Oral Q4H PRN Fredy Fung MD        magnesium hydroxide (MILK OF MAGNESIA) 400 mg/5 mL oral suspension 30 mL  30 mL Oral DAILY PRN Christine Monson MD        ziprasidone (GEODON) injection 20 mg  20 mg IntraMUSCular Q12H PRN Christine Monson MD            No Known Allergies    Review of Systems:  A comprehensive review of systems was negative except for that written in the History of Present Illness.     Objective:     Vitals:    10/21/21 0820 PHYSICAL THERAPY EVALUATION - INPATIENT     Room Number: 384/384-A  Evaluation Date: 6/30/2020  Type of Evaluation: Initial  Physician Order: PT Eval and Treat    Presenting Problem: s/p C4-6 posterior fusion 6/29/20  Reason for Therapy: Mobility Dys 10/21/21 1026 10/21/21 1937 10/22/21 0733   BP: 124/77 124/77 118/65 (!) 134/92   Pulse: 74 74 86 72   Resp: 18 18 18 20   Temp: 97.6 °F (36.4 °C) 97.6 °F (36.4 °C) 98.1 °F (36.7 °C) 97.9 °F (36.6 °C)   SpO2: 100%  100% 98%   Weight:       Height:            Physical Exam:  General:  Alert, cooperative, no distress, appears stated age. Eyes:  Conjunctivae/corneas clear. PERRL, EOMs intact. Fundi benign   Ears:  Normal TMs and external ear canals both ears. Nose: Nares normal. Septum midline. Mucosa normal. No drainage or sinus tenderness. Mouth/Throat: Lips, mucosa, and tongue normal. Teeth and gums normal.   Neck: Supple, symmetrical, trachea midline, no adenopathy, thyroid: no enlargment/tenderness/nodules, no carotid bruit and no JVD. Back:   Symmetric, no curvature. ROM normal. No CVA tenderness. Lungs:   Clear to auscultation bilaterally. Heart:  Regular rate and rhythm, S1, S2 normal, no murmur, click, rub or gallop. Abdomen:   Soft, non-tender. Bowel sounds normal. No masses,  No organomegaly. Extremities: Extremities normal, atraumatic, no cyanosis or edema. Pulses: 2+ and symmetric all extremities. Skin: Skin color, texture, turgor normal. No rashes or lesions   Lymph nodes: Cervical, supraclavicular, and axillary nodes normal.   Neurologic: CNII-XII intact. Normal strength, sensation and reflexes throughout. No results found for this or any previous visit (from the past 24 hour(s)).     Assessment:     Hospital Problems  Never Reviewed        Codes Class Noted POA    Delusion ([facility] ICD-10-CM: F22  ICD-9-CM: 297.9  10/20/2021 Unknown        ETOH abuse ICD-10-CM: F10.10  ICD-9-CM: 305.00  10/20/2021 Unknown              Plan:     Continue with present treatment    Signed By: Laquita Sharpe MD     October 22, 2021 floor)  Stairs to Enter : 0     Stairs to Bedroom: 0       Lives With: Spouse  Drives: Yes  Patient Owned Equipment: None  Patient Regularly Uses: Reading glasses    Prior Level of [place]: Pt reports ind pta, did not use assistive device.   Pt lives w in hospital room?: A Little   -   Climbing 3-5 steps with a railing?: A Little       AM-PAC Score:  Raw Score: 18   Approx Degree of Impairment: 46.58%   Standardized Score (AM-PAC Scale): 43.63   CMS Modifier (G-Code): CK    FUNCTIONAL ABILITY STATUS  Michelle Wayne posterior fusion. Pertinent comorbidities and personal factors impacting therapy include h/o C4-6 ACDF 2018. In this PT evaluation, the patient presents with the following impairments surgical pain, dec activity tolerance.   These impairments and comorbid

## 2021-10-28 ENCOUNTER — HOSPITAL ENCOUNTER (OUTPATIENT)
Dept: BONE DENSITY | Age: 61
Discharge: HOME | End: 2021-10-28
Attending: FAMILY MEDICINE
Payer: COMMERCIAL

## 2021-10-28 DIAGNOSIS — Z13.820 SCREENING FOR OSTEOPOROSIS: ICD-10-CM

## 2021-10-28 PROCEDURE — 77080 DXA BONE DENSITY AXIAL: CPT | Performed by: FAMILY MEDICINE

## 2021-10-29 RX ORDER — TRAZODONE HYDROCHLORIDE 50 MG/1
TABLET ORAL
Qty: 90 TABLET | Refills: 0 | Status: SHIPPED | OUTPATIENT
Start: 2021-10-29 | End: 2021-11-08 | Stop reason: DRUGHIGH

## 2021-10-29 NOTE — TELEPHONE ENCOUNTER
Pt asking about denied refill. Informed pt she is overdue for physical.    Pt scheduled for 11/01/21.     Order pended

## 2021-11-01 ENCOUNTER — OFFICE VISIT (OUTPATIENT)
Dept: FAMILY MEDICINE CLINIC | Facility: CLINIC | Age: 61
End: 2021-11-01
Payer: COMMERCIAL

## 2021-11-01 VITALS
HEIGHT: 63.5 IN | TEMPERATURE: 98 F | BODY MASS INDEX: 22.92 KG/M2 | RESPIRATION RATE: 16 BRPM | WEIGHT: 131 LBS | HEART RATE: 80 BPM | SYSTOLIC BLOOD PRESSURE: 108 MMHG | DIASTOLIC BLOOD PRESSURE: 64 MMHG

## 2021-11-01 DIAGNOSIS — M81.0 OSTEOPOROSIS, UNSPECIFIED OSTEOPOROSIS TYPE, UNSPECIFIED PATHOLOGICAL FRACTURE PRESENCE: ICD-10-CM

## 2021-11-01 DIAGNOSIS — Z00.00 BLOOD TESTS FOR ROUTINE GENERAL PHYSICAL EXAMINATION: ICD-10-CM

## 2021-11-01 DIAGNOSIS — Z23 NEED FOR VACCINATION: ICD-10-CM

## 2021-11-01 DIAGNOSIS — E55.9 VITAMIN D DEFICIENCY: ICD-10-CM

## 2021-11-01 DIAGNOSIS — R53.83 LOW ENERGY: ICD-10-CM

## 2021-11-01 DIAGNOSIS — Z00.00 ROUTINE GENERAL MEDICAL EXAMINATION AT A HEALTH CARE FACILITY: Primary | ICD-10-CM

## 2021-11-01 PROCEDURE — 3074F SYST BP LT 130 MM HG: CPT | Performed by: FAMILY MEDICINE

## 2021-11-01 PROCEDURE — 99396 PREV VISIT EST AGE 40-64: CPT | Performed by: FAMILY MEDICINE

## 2021-11-01 PROCEDURE — 3078F DIAST BP <80 MM HG: CPT | Performed by: FAMILY MEDICINE

## 2021-11-01 PROCEDURE — 90471 IMMUNIZATION ADMIN: CPT | Performed by: FAMILY MEDICINE

## 2021-11-01 PROCEDURE — 3008F BODY MASS INDEX DOCD: CPT | Performed by: FAMILY MEDICINE

## 2021-11-01 PROCEDURE — 90686 IIV4 VACC NO PRSV 0.5 ML IM: CPT | Performed by: FAMILY MEDICINE

## 2021-11-01 RX ORDER — CONJUGATED ESTROGENS 0.62 MG/G
CREAM VAGINAL
Qty: 60 G | Refills: 1 | Status: SHIPPED | OUTPATIENT
Start: 2021-11-01

## 2021-11-01 RX ORDER — IBANDRONATE SODIUM 150 MG/1
150 TABLET, FILM COATED ORAL
Qty: 3 TABLET | Refills: 3 | Status: SHIPPED | OUTPATIENT
Start: 2021-11-01

## 2021-11-01 NOTE — PROGRESS NOTES
CHIEF COMPLAINT       Annual Physical Exam  HPI:   Ronnie Ureña is a 59year old female who presents for a complete physical exam.   history of partial hyst for bleeding  - one ovary remains. Pap 11/2020 normal.   No vitamin D currently.     Finished marita (49010 UT) Oral Cap Take 1 capsule (50,000 Units total) by mouth once a week. 4 capsule 1   • Multiple Vitamins-Minerals (MULTI-VITAMIN/MINERALS) Oral Tab Take 1 tablet by mouth daily. • Melatonin 5 MG Oral Tab Take 5 mg by mouth nightly. chest    • PORT, INDWELLING, IMP        Family History   Problem Relation Age of Onset   • Heart Disorder Father 37        CABG x 5, had stents later in life   • Diabetes Father    • Breast Cancer Mother 50   • Other (osteoporosis) Mother    • Cancer Pater pain  NEURO: no complaint of headaches    EXAM:   /64   Pulse 80   Temp 97.8 °F (36.6 °C)   Resp 16   Ht 5' 3.5\" (1.613 m)   Wt 131 lb (59.4 kg)   BMI 22.84 kg/m²   Body mass index is 22.84 kg/m².    GENERAL: well developed, well nourished,in no appa Sig: Apply pea sized amount externally at HS.        Imaging & Consults:  FLULAVAL INFLUENZA VACCINE QUAD PRESERVATIVE FREE 0.5 ML    Patient prefers to try Boniva rather than restarting Reclast.  She had tried an oral bisphosphonate in the past and could intended as a substitute for professional medical care. Always follow your healthcare professional's instructions.

## 2021-11-01 NOTE — PATIENT INSTRUCTIONS
Let Dr. Melania Altamirano know that we started Boniva. Try increasing the trazodone to 100mg nightly then update me after a week.     Osteoporosis Medicines: Bisphosphonates  Depending on your needs, your healthcare provider may prescribe medicines to prevent or lolis

## 2021-11-03 ENCOUNTER — NURSE ONLY (OUTPATIENT)
Dept: HEMATOLOGY/ONCOLOGY | Facility: HOSPITAL | Age: 61
End: 2021-11-03
Attending: INTERNAL MEDICINE
Payer: COMMERCIAL

## 2021-11-03 DIAGNOSIS — C21.0 ADENOSQUAMOUS CARCINOMA OF ANUS (HCC): ICD-10-CM

## 2021-11-03 PROCEDURE — 36415 COLL VENOUS BLD VENIPUNCTURE: CPT

## 2021-11-03 PROCEDURE — 82378 CARCINOEMBRYONIC ANTIGEN: CPT

## 2021-11-05 ENCOUNTER — MED REC SCAN ONLY (OUTPATIENT)
Dept: FAMILY MEDICINE CLINIC | Facility: CLINIC | Age: 61
End: 2021-11-05

## 2021-11-05 ENCOUNTER — LAB ENCOUNTER (OUTPATIENT)
Dept: LAB | Facility: HOSPITAL | Age: 61
End: 2021-11-05
Attending: FAMILY MEDICINE
Payer: COMMERCIAL

## 2021-11-05 DIAGNOSIS — E55.9 VITAMIN D DEFICIENCY: ICD-10-CM

## 2021-11-05 DIAGNOSIS — R53.83 LOW ENERGY: ICD-10-CM

## 2021-11-05 DIAGNOSIS — Z00.00 BLOOD TESTS FOR ROUTINE GENERAL PHYSICAL EXAMINATION: ICD-10-CM

## 2021-11-05 PROCEDURE — 82306 VITAMIN D 25 HYDROXY: CPT

## 2021-11-05 PROCEDURE — 80061 LIPID PANEL: CPT

## 2021-11-05 PROCEDURE — 82607 VITAMIN B-12: CPT

## 2021-11-05 PROCEDURE — 84443 ASSAY THYROID STIM HORMONE: CPT

## 2021-11-05 PROCEDURE — 36415 COLL VENOUS BLD VENIPUNCTURE: CPT

## 2021-11-07 ENCOUNTER — PATIENT MESSAGE (OUTPATIENT)
Dept: FAMILY MEDICINE CLINIC | Facility: CLINIC | Age: 61
End: 2021-11-07

## 2021-11-08 DIAGNOSIS — E78.5 HYPERLIPIDEMIA, UNSPECIFIED HYPERLIPIDEMIA TYPE: Primary | ICD-10-CM

## 2021-11-08 RX ORDER — TRAZODONE HYDROCHLORIDE 100 MG/1
100 TABLET ORAL NIGHTLY
Qty: 90 TABLET | Refills: 3 | Status: SHIPPED | OUTPATIENT
Start: 2021-11-08

## 2021-11-08 NOTE — TELEPHONE ENCOUNTER
From: Cindy Smiley  To: Martine Chester PA-C  Sent: 11/7/2021 7:56 AM CST  Subject: Follow up     100mg of trazadone is working better. Still not sleeping through the night every night but higher dose is better.  Please let pharmacist know ow that I a

## 2021-11-08 NOTE — TELEPHONE ENCOUNTER
Ok to change to trazodone 100mg si po at HS #90 with 3 refills. Go ahead and send the new prescription so that she has it when she runs out of the 50 mg pills.   See result note for vitamin D.

## 2021-11-09 DIAGNOSIS — E55.9 VITAMIN D DEFICIENCY: Primary | ICD-10-CM

## 2021-11-09 RX ORDER — ERGOCALCIFEROL 1.25 MG/1
50000 CAPSULE ORAL WEEKLY
Qty: 8 CAPSULE | Refills: 0 | Status: SHIPPED | OUTPATIENT
Start: 2021-11-09 | End: 2022-01-04

## 2021-12-07 ENCOUNTER — LAB ENCOUNTER (OUTPATIENT)
Dept: LAB | Facility: HOSPITAL | Age: 61
End: 2021-12-07
Attending: COLON & RECTAL SURGERY
Payer: COMMERCIAL

## 2021-12-07 DIAGNOSIS — C21.0 ADENOSQUAMOUS CARCINOMA OF ANUS (HCC): ICD-10-CM

## 2021-12-09 ENCOUNTER — ANESTHESIA (OUTPATIENT)
Dept: ENDOSCOPY | Facility: HOSPITAL | Age: 61
End: 2021-12-09
Payer: COMMERCIAL

## 2021-12-09 ENCOUNTER — ANESTHESIA EVENT (OUTPATIENT)
Dept: ENDOSCOPY | Facility: HOSPITAL | Age: 61
End: 2021-12-09
Payer: COMMERCIAL

## 2021-12-09 ENCOUNTER — HOSPITAL ENCOUNTER (OUTPATIENT)
Facility: HOSPITAL | Age: 61
Setting detail: HOSPITAL OUTPATIENT SURGERY
Discharge: HOME | End: 2021-12-09
Attending: COLON & RECTAL SURGERY | Admitting: COLON & RECTAL SURGERY
Payer: COMMERCIAL

## 2021-12-09 VITALS
OXYGEN SATURATION: 99 % | HEART RATE: 66 BPM | WEIGHT: 130 LBS | SYSTOLIC BLOOD PRESSURE: 114 MMHG | DIASTOLIC BLOOD PRESSURE: 48 MMHG | HEIGHT: 64 IN | RESPIRATION RATE: 16 BRPM | BODY MASS INDEX: 22.2 KG/M2 | TEMPERATURE: 97 F

## 2021-12-09 DIAGNOSIS — C21.0 ANAL CANCER (HCC): ICD-10-CM

## 2021-12-09 DIAGNOSIS — C21.0 ADENOSQUAMOUS CARCINOMA OF ANUS (HCC): Primary | ICD-10-CM

## 2021-12-09 PROCEDURE — 0DBP8ZX EXCISION OF RECTUM, VIA NATURAL OR ARTIFICIAL OPENING ENDOSCOPIC, DIAGNOSTIC: ICD-10-PCS | Performed by: COLON & RECTAL SURGERY

## 2021-12-09 PROCEDURE — 45380 COLONOSCOPY AND BIOPSY: CPT | Performed by: COLON & RECTAL SURGERY

## 2021-12-09 RX ORDER — SODIUM CHLORIDE, SODIUM LACTATE, POTASSIUM CHLORIDE, CALCIUM CHLORIDE 600; 310; 30; 20 MG/100ML; MG/100ML; MG/100ML; MG/100ML
INJECTION, SOLUTION INTRAVENOUS CONTINUOUS
Status: DISCONTINUED | OUTPATIENT
Start: 2021-12-09 | End: 2021-12-09

## 2021-12-09 RX ORDER — NALOXONE HYDROCHLORIDE 0.4 MG/ML
80 INJECTION, SOLUTION INTRAMUSCULAR; INTRAVENOUS; SUBCUTANEOUS AS NEEDED
Status: DISCONTINUED | OUTPATIENT
Start: 2021-12-09 | End: 2021-12-09

## 2021-12-09 RX ADMIN — SODIUM CHLORIDE, SODIUM LACTATE, POTASSIUM CHLORIDE, CALCIUM CHLORIDE: 600; 310; 30; 20 INJECTION, SOLUTION INTRAVENOUS at 08:45:00

## 2021-12-09 RX ADMIN — SODIUM CHLORIDE, SODIUM LACTATE, POTASSIUM CHLORIDE, CALCIUM CHLORIDE: 600; 310; 30; 20 INJECTION, SOLUTION INTRAVENOUS at 08:24:00

## 2021-12-09 NOTE — H&P
See note below. The patient presents for 1 year surveillance colonosocopy. All questions answered. Proceed with surgery. Trinity Rosario MD  [facility]  12/9/2021  8:17 AM        Active Problems  1.  Adenosquamous carcinoma of anus ([address] Surgeon: Fatoumata Medina MD;  Location: [facility]    has 1 ovary left   • OTHER  06/04/2018    CERVICAL 4 - CERVICAL 5, CERVICAL 5 - CERVICAL 6  ANTERIOR CERVICAL DISCECTOMY AND FUSION WITH INSTRUMENTATION, ALLOGRA premenopausal     Social History    Tobacco Use      Smoking status: Never Smoker      Smokeless tobacco: Never Used    Vaping Use      Vaping Use: Never used    Alcohol use: Not Currently    Drug use: No     No current facility-administered medica 2020.    The benefits of colonoscopy, including detection of cancer and polyp removal prior to progression to cancer were discussed.  The details of the procedure which include navigation of the colonoscope through the anus, rectum, and colon to evaluate th Abdominal Pain, N/V/D

## 2021-12-09 NOTE — OPERATIVE REPORT
[facility]  Operative Note    Yelena Gan Location: OR   [facility] [id number] MRN [id number]    1960 Age 59year old   Admission Date 2021 Operation Date 2021   Attending Physician Pamla Ahumada, MD Operating Physician Barbara Cardoso The cecum, ascending colon, transverse colon, descending colon, sigmoid colon, and rectum were thoroughly examined. In the rectum the scope was retroflexed to evaluate the anorectal junction.   The scope was straightened and excess gas was suctioned from th

## 2021-12-09 NOTE — ANESTHESIA POSTPROCEDURE EVALUATION
[address] [facility] Patient Status:  Hospital Outpatient Surgery   Age/Gender 59year old female MRN [id number]   Location [id number] [place] [address] Attending Marisa Linton MD   Hosp Day # 0 PCP Kimberly Kulkarni MD

## 2021-12-09 NOTE — ANESTHESIA PREPROCEDURE EVALUATION
PRE-OP EVALUATION    Patient Name: Ranell Smoke    Admit Diagnosis: Anal cancer ([facility] [address] [C21.0]    Pre-op Diagnosis: Anal cancer ([facility] [address] [C21.0]    COLONOSCOPY    Anesthesia Procedure: COLONOSCOPY (N/A )    Surgeon(s) and Role:     * Jonathon Eli M Endo/Other    Negative endo/other ROS. Pulmonary    Negative pulmonary ROS.                        Neuro/Psych                 (+) neuromuscular disease                   Past Surgical History:   Procedure Laterality Date   • CO 09/26/2021    CO2 26.0 09/26/2021    BUN 17 09/26/2021    CREATSERUM 0.72 09/26/2021    GLU 87 09/26/2021    CA 9.0 09/26/2021            Airway      Mallampati: II       Cardiovascular    Cardiovascular exam normal.         Dental    No notable dental his

## 2021-12-09 NOTE — ANESTHESIA POSTPROCEDURE EVALUATION
[address] [facility] Patient Status:  Hospital Outpatient Surgery   Age/Gender 59year old female MRN [id number]   Location [address] [address] Attending Shima Minaya MD   Hosp Day # 0 PCP Paul Hoffmann MD

## 2021-12-17 ENCOUNTER — TELEPHONE (OUTPATIENT)
Dept: SURGERY | Facility: CLINIC | Age: 61
End: 2021-12-17

## 2021-12-17 NOTE — TELEPHONE ENCOUNTER
----- Message from Shelia Dalal MD sent at 12/15/2021  3:06 PM CST -----  Pathology shows no evidence of adenomatous or malignant tissue. 3 Year colonoscopy recall.

## 2021-12-21 ENCOUNTER — HOSPITAL ENCOUNTER (OUTPATIENT)
Dept: MAMMOGRAPHY | Facility: HOSPITAL | Age: 61
Discharge: HOME | End: 2021-12-21
Attending: INTERNAL MEDICINE
Payer: COMMERCIAL

## 2021-12-21 DIAGNOSIS — C21.0 ADENOSQUAMOUS CARCINOMA OF ANUS (HCC): ICD-10-CM

## 2021-12-21 PROCEDURE — 77067 SCR MAMMO BI INCL CAD: CPT | Performed by: INTERNAL MEDICINE

## 2021-12-21 PROCEDURE — 77063 BREAST TOMOSYNTHESIS BI: CPT | Performed by: INTERNAL MEDICINE

## 2021-12-29 ENCOUNTER — TELEPHONE (OUTPATIENT)
Dept: HEMATOLOGY/ONCOLOGY | Facility: HOSPITAL | Age: 61
End: 2021-12-29

## 2021-12-29 ENCOUNTER — NURSE NAVIGATOR ENCOUNTER (OUTPATIENT)
Dept: HEMATOLOGY/ONCOLOGY | Facility: HOSPITAL | Age: 61
End: 2021-12-29

## 2021-12-29 NOTE — TELEPHONE ENCOUNTER
Spoke with patient. She verbalized understanding. Breast navigator contacted to assist with patient needs. Tate Bardales MD  P Edw Quan Lindquist Rns  Results reviewed.  Mammogram was normal. However, given family history, she was to be referred to [facility]

## 2022-01-01 NOTE — TELEPHONE ENCOUNTER
Follow Up: to be seen in clinic /home care on      Discharge Instructions  You may not be sure when your baby is sick and needs to see a doctor, especially if this is your first baby.  DO call your clinic if you are worried about your baby s health.  Most clinics have a 24-hour nurse help line. They are able to answer your questions or reach your doctor 24 hours a day. It is best to call your doctor or clinic instead of the hospital. We are here to help you.    Call 911 if your baby:  - Is limp and floppy  - Has  stiff arms or legs or repeated jerking movements  - Arches his or her back repeatedly  - Has a high-pitched cry  - Has bluish skin  or looks very pale    Call your baby s doctor or go to the emergency room right away if your baby:  - Has a high fever: Rectal temperature of 100.4 degrees F (38 degrees C) or higher or underarm temperature of 99 degree F (37.2 C) or higher.  - Has skin that looks yellow, and the baby seems very sleepy.  - Has an infection (redness, swelling, pain) around the umbilical cord or circumcised penis OR bleeding that does not stop after a few minutes.    Call your baby s clinic if you notice:  - A low rectal temperature of (97.5 degrees F or 36.4 degree C).  - Changes in behavior.  For example, a normally quiet baby is very fussy and irritable all day, or an active baby is very sleepy and limp.  - Vomiting. This is not spitting up after feedings, which is normal, but actually throwing up the contents of the stomach.  - Diarrhea (watery stools) or constipation (hard, dry stools that are difficult to pass). Ipava stools are usually quite soft but should not be watery.  - Blood or mucus in the stools.  - Coughing or breathing changes (fast breathing, forceful breathing, or noisy breathing after you clear mucus from the nose).  - Feeding problems with a lot of spitting up.  - Your baby does not want to feed for more than 6 to 8 hours or has fewer diapers than expected in  Pt informed that Romina Phoenix PA-C approved to order Reclast Infusion treatment, but advised pt that she will have to do the Bone Dexa scan prior to the next treatment or Endo. Pt voiced understanding.     Labs are still pending from 6 months ago th a 24 hour period.  Refer to the feeding log for expected number of wet diapers in the first days of life.    If you have any concerns about hurting yourself of the baby, call your doctor right away.      Baby's Birth Weight: 8 lb 5.7 oz (3790 g)  Baby's Discharge Weight: 3.79 kg (8 lb 5.7 oz) (Filed from Delivery Summary)    No results for input(s): ABO, RH, GDAT, TCBIL, DBIL, BILITOTAL, BILICONJ, BILINEONATAL in the last 84813 hours.    Immunization History   Administered Date(s) Administered     Hep B, Peds or Adolescent 2022       Hearing Screen Date:           Umbilical Cord: cord clamp intact,moist (first 24 hours after birth)    Pulse Oximetry Screen Result:    (right arm):    (foot):      Car Seat Testing Results:      Date and Time of  Metabolic Screen:         ID Band Number __[id number]______  I have checked to make sure that this is my baby.

## 2022-02-24 ENCOUNTER — APPOINTMENT (OUTPATIENT)
Dept: HEMATOLOGY/ONCOLOGY | Facility: HOSPITAL | Age: 62
End: 2022-02-24
Attending: INTERNAL MEDICINE
Payer: COMMERCIAL

## 2022-02-24 ENCOUNTER — APPOINTMENT (OUTPATIENT)
Dept: GENETICS | Facility: HOSPITAL | Age: 62
End: 2022-02-24
Attending: INTERNAL MEDICINE
Payer: COMMERCIAL

## 2022-04-07 NOTE — PROGRESS NOTES
Pt came in for cll. Tolerated well by pt. Will continue to monitor.
PRINCIPAL DISCHARGE DIAGNOSIS  Diagnosis: Abnormal nuclear stress test  Assessment and Plan of Treatment:       SECONDARY DISCHARGE DIAGNOSES  Diagnosis: HTN (hypertension)  Assessment and Plan of Treatment:     Diagnosis: HLD (hyperlipidemia)  Assessment and Plan of Treatment:     Diagnosis: DM (diabetes mellitus)  Assessment and Plan of Treatment:

## 2022-08-25 ENCOUNTER — TELEPHONE (OUTPATIENT)
Dept: HEMATOLOGY/ONCOLOGY | Facility: HOSPITAL | Age: 62
End: 2022-08-25

## 2022-08-25 NOTE — TELEPHONE ENCOUNTER
Patient calling. Is asking if a yearly CT is due?? And when does she need to schedule a F/U.     Please advise

## 2022-08-25 NOTE — TELEPHONE ENCOUNTER
Patient notified that she is due for followup and needs to schedule appointment. Verbalized understanding. Transferred to  to schedule appointment with Dr. Huseyin Galvan.

## 2022-08-31 ENCOUNTER — OFFICE VISIT (OUTPATIENT)
Dept: HEMATOLOGY/ONCOLOGY | Facility: HOSPITAL | Age: 62
End: 2022-08-31
Attending: INTERNAL MEDICINE
Payer: COMMERCIAL

## 2022-08-31 VITALS
RESPIRATION RATE: 16 BRPM | SYSTOLIC BLOOD PRESSURE: 142 MMHG | HEART RATE: 87 BPM | TEMPERATURE: 97 F | BODY MASS INDEX: 26 KG/M2 | OXYGEN SATURATION: 99 % | DIASTOLIC BLOOD PRESSURE: 79 MMHG | WEIGHT: 151.38 LBS

## 2022-08-31 DIAGNOSIS — C21.0 ADENOSQUAMOUS CARCINOMA OF ANUS (HCC): Primary | ICD-10-CM

## 2022-08-31 LAB
ALBUMIN SERPL-MCNC: 3.6 G/DL (ref 3.4–5)
ALBUMIN/GLOB SERPL: 1.1 {RATIO} (ref 1–2)
ALP LIVER SERPL-CCNC: 69 U/L
ALT SERPL-CCNC: 29 U/L
ANION GAP SERPL CALC-SCNC: 1 MMOL/L (ref 0–18)
AST SERPL-CCNC: 17 U/L (ref 15–37)
BASOPHILS # BLD AUTO: 0.02 X10(3) UL (ref 0–0.2)
BASOPHILS NFR BLD AUTO: 0.6 %
BILIRUB SERPL-MCNC: 0.3 MG/DL (ref 0.1–2)
BUN BLD-MCNC: 22 MG/DL (ref 7–18)
CALCIUM BLD-MCNC: 9.1 MG/DL (ref 8.5–10.1)
CEA SERPL-MCNC: 0.4 NG/ML (ref ?–5)
CHLORIDE SERPL-SCNC: 112 MMOL/L (ref 98–112)
CO2 SERPL-SCNC: 26 MMOL/L (ref 21–32)
CREAT BLD-MCNC: 0.85 MG/DL
DEPRECATED HBV CORE AB SER IA-ACNC: 48.3 NG/ML
EOSINOPHIL # BLD AUTO: 0.17 X10(3) UL (ref 0–0.7)
EOSINOPHIL NFR BLD AUTO: 4.7 %
ERYTHROCYTE [DISTWIDTH] IN BLOOD BY AUTOMATED COUNT: 12.9 %
FOLATE SERPL-MCNC: 12.5 NG/ML (ref 8.7–?)
GFR SERPLBLD BASED ON 1.73 SQ M-ARVRAT: 76 ML/MIN/1.73M2 (ref 60–?)
GLOBULIN PLAS-MCNC: 3.3 G/DL (ref 2.8–4.4)
GLUCOSE BLD-MCNC: 103 MG/DL (ref 70–99)
HCT VFR BLD AUTO: 38.6 %
HGB BLD-MCNC: 12.4 G/DL
IMM GRANULOCYTES # BLD AUTO: 0.02 X10(3) UL (ref 0–1)
IMM GRANULOCYTES NFR BLD: 0.6 %
IRON SATN MFR SERPL: 18 %
IRON SERPL-MCNC: 63 UG/DL
LYMPHOCYTES # BLD AUTO: 1.06 X10(3) UL (ref 1–4)
LYMPHOCYTES NFR BLD AUTO: 29.4 %
MCH RBC QN AUTO: 30.8 PG (ref 26–34)
MCHC RBC AUTO-ENTMCNC: 32.1 G/DL (ref 31–37)
MCV RBC AUTO: 96 FL
MONOCYTES # BLD AUTO: 0.22 X10(3) UL (ref 0.1–1)
MONOCYTES NFR BLD AUTO: 6.1 %
NEUTROPHILS # BLD AUTO: 2.11 X10 (3) UL (ref 1.5–7.7)
NEUTROPHILS # BLD AUTO: 2.11 X10(3) UL (ref 1.5–7.7)
NEUTROPHILS NFR BLD AUTO: 58.6 %
OSMOLALITY SERPL CALC.SUM OF ELEC: 292 MOSM/KG (ref 275–295)
PLATELET # BLD AUTO: 228 10(3)UL (ref 150–450)
POTASSIUM SERPL-SCNC: 5.5 MMOL/L (ref 3.5–5.1)
PROT SERPL-MCNC: 6.9 G/DL (ref 6.4–8.2)
RBC # BLD AUTO: 4.02 X10(6)UL
SODIUM SERPL-SCNC: 139 MMOL/L (ref 136–145)
TIBC SERPL-MCNC: 343 UG/DL (ref 240–450)
TRANSFERRIN SERPL-MCNC: 230 MG/DL (ref 200–360)
VIT B12 SERPL-MCNC: 358 PG/ML (ref 193–986)
VIT D+METAB SERPL-MCNC: 25.1 NG/ML (ref 30–100)
WBC # BLD AUTO: 3.6 X10(3) UL (ref 4–11)

## 2022-08-31 PROCEDURE — 82306 VITAMIN D 25 HYDROXY: CPT | Performed by: INTERNAL MEDICINE

## 2022-08-31 PROCEDURE — 99211 OFF/OP EST MAY X REQ PHY/QHP: CPT

## 2022-08-31 PROCEDURE — 82746 ASSAY OF FOLIC ACID SERUM: CPT | Performed by: INTERNAL MEDICINE

## 2022-08-31 PROCEDURE — 83550 IRON BINDING TEST: CPT | Performed by: INTERNAL MEDICINE

## 2022-08-31 PROCEDURE — 82728 ASSAY OF FERRITIN: CPT | Performed by: INTERNAL MEDICINE

## 2022-08-31 PROCEDURE — 82378 CARCINOEMBRYONIC ANTIGEN: CPT | Performed by: INTERNAL MEDICINE

## 2022-08-31 PROCEDURE — 80053 COMPREHEN METABOLIC PANEL: CPT | Performed by: INTERNAL MEDICINE

## 2022-08-31 PROCEDURE — 82607 VITAMIN B-12: CPT | Performed by: INTERNAL MEDICINE

## 2022-08-31 PROCEDURE — 83540 ASSAY OF IRON: CPT | Performed by: INTERNAL MEDICINE

## 2022-08-31 PROCEDURE — 36415 COLL VENOUS BLD VENIPUNCTURE: CPT

## 2022-08-31 PROCEDURE — 85025 COMPLETE CBC W/AUTO DIFF WBC: CPT | Performed by: INTERNAL MEDICINE

## 2022-08-31 RX ORDER — DIPHENHYDRAMINE HCL 25 MG
50 CAPSULE ORAL AS DIRECTED
Qty: 2 CAPSULE | Refills: 0 | Status: SHIPPED | OUTPATIENT
Start: 2022-08-31

## 2022-08-31 RX ORDER — PREDNISONE 50 MG/1
50 TABLET ORAL AS DIRECTED
Qty: 3 TABLET | Refills: 0 | Status: SHIPPED | OUTPATIENT
Start: 2022-08-31

## 2022-08-31 NOTE — PROGRESS NOTES
Outpatient Oncology Care Plan   Problem list:   fatigue   Problems related to:   self care   Interventions:   provided general teaching   Expected outcomes:   understands plan of care   Progress towards outcome: making progress     Education Record   Learner: Patient   Barriers / Limitations: None   Method: Discussion   Outcome: Shows understanding   Comments:  Patient here for follow-up. States energy levels are slowly improving. having bowel incontinence and diarrhea. Not currently taking any imodium. Denies any additional symptoms or changes at this time.

## 2022-09-06 ENCOUNTER — OFFICE VISIT (OUTPATIENT)
Dept: FAMILY MEDICINE CLINIC | Facility: CLINIC | Age: 62
End: 2022-09-06
Payer: COMMERCIAL

## 2022-09-06 ENCOUNTER — LAB ENCOUNTER (OUTPATIENT)
Dept: LAB | Age: 62
End: 2022-09-06
Attending: NURSE PRACTITIONER
Payer: COMMERCIAL

## 2022-09-06 ENCOUNTER — TELEPHONE (OUTPATIENT)
Dept: CT IMAGING | Facility: HOSPITAL | Age: 62
End: 2022-09-06

## 2022-09-06 ENCOUNTER — HOSPITAL ENCOUNTER (OUTPATIENT)
Dept: GENERAL RADIOLOGY | Age: 62
Discharge: HOME | End: 2022-09-06
Attending: NURSE PRACTITIONER
Payer: COMMERCIAL

## 2022-09-06 VITALS
TEMPERATURE: 98 F | BODY MASS INDEX: 25.89 KG/M2 | SYSTOLIC BLOOD PRESSURE: 132 MMHG | HEART RATE: 84 BPM | HEIGHT: 64.17 IN | RESPIRATION RATE: 16 BRPM | DIASTOLIC BLOOD PRESSURE: 80 MMHG | WEIGHT: 151.63 LBS

## 2022-09-06 DIAGNOSIS — G47.00 INSOMNIA, UNSPECIFIED TYPE: ICD-10-CM

## 2022-09-06 DIAGNOSIS — M54.42 ACUTE BILATERAL LOW BACK PAIN WITH BILATERAL SCIATICA: ICD-10-CM

## 2022-09-06 DIAGNOSIS — M54.42 ACUTE BILATERAL LOW BACK PAIN WITH BILATERAL SCIATICA: Primary | ICD-10-CM

## 2022-09-06 DIAGNOSIS — M54.41 ACUTE BILATERAL LOW BACK PAIN WITH BILATERAL SCIATICA: ICD-10-CM

## 2022-09-06 DIAGNOSIS — E78.5 HYPERLIPIDEMIA, UNSPECIFIED HYPERLIPIDEMIA TYPE: ICD-10-CM

## 2022-09-06 DIAGNOSIS — E55.9 VITAMIN D DEFICIENCY: ICD-10-CM

## 2022-09-06 DIAGNOSIS — M54.41 ACUTE BILATERAL LOW BACK PAIN WITH BILATERAL SCIATICA: Primary | ICD-10-CM

## 2022-09-06 DIAGNOSIS — E87.5 HYPERKALEMIA: ICD-10-CM

## 2022-09-06 DIAGNOSIS — C21.0 ADENOSQUAMOUS CARCINOMA OF ANUS (HCC): ICD-10-CM

## 2022-09-06 DIAGNOSIS — R29.898 DIFFICULTY IN WEIGHT BEARING: ICD-10-CM

## 2022-09-06 LAB
CHOLEST SERPL-MCNC: 185 MG/DL (ref ?–200)
FASTING PATIENT LIPID ANSWER: YES
HDLC SERPL-MCNC: 59 MG/DL (ref 40–59)
LDLC SERPL CALC-MCNC: 115 MG/DL (ref ?–100)
NONHDLC SERPL-MCNC: 126 MG/DL (ref ?–130)
POTASSIUM SERPL-SCNC: 5 MMOL/L (ref 3.5–5.1)
TRIGL SERPL-MCNC: 60 MG/DL (ref 30–149)
VLDLC SERPL CALC-MCNC: 10 MG/DL (ref 0–30)

## 2022-09-06 PROCEDURE — 72110 X-RAY EXAM L-2 SPINE 4/>VWS: CPT | Performed by: NURSE PRACTITIONER

## 2022-09-06 PROCEDURE — 99214 OFFICE O/P EST MOD 30 MIN: CPT | Performed by: NURSE PRACTITIONER

## 2022-09-06 PROCEDURE — 3079F DIAST BP 80-89 MM HG: CPT | Performed by: NURSE PRACTITIONER

## 2022-09-06 PROCEDURE — 3008F BODY MASS INDEX DOCD: CPT | Performed by: NURSE PRACTITIONER

## 2022-09-06 PROCEDURE — 80061 LIPID PANEL: CPT | Performed by: FAMILY MEDICINE

## 2022-09-06 PROCEDURE — 84132 ASSAY OF SERUM POTASSIUM: CPT | Performed by: NURSE PRACTITIONER

## 2022-09-06 PROCEDURE — 72072 X-RAY EXAM THORAC SPINE 3VWS: CPT | Performed by: NURSE PRACTITIONER

## 2022-09-06 PROCEDURE — 3075F SYST BP GE 130 - 139MM HG: CPT | Performed by: NURSE PRACTITIONER

## 2022-09-06 RX ORDER — ERGOCALCIFEROL 1.25 MG/1
50000 CAPSULE ORAL
Qty: 8 CAPSULE | Refills: 0 | Status: CANCELLED | OUTPATIENT
Start: 2022-09-06

## 2022-09-06 RX ORDER — ERGOCALCIFEROL 1.25 MG/1
50000 CAPSULE ORAL WEEKLY
Qty: 8 CAPSULE | Refills: 0 | Status: SHIPPED | OUTPATIENT
Start: 2022-09-06 | End: 2022-09-14

## 2022-09-06 RX ORDER — TRAZODONE HYDROCHLORIDE 100 MG/1
100 TABLET ORAL NIGHTLY
Qty: 90 TABLET | Refills: 2 | Status: SHIPPED | OUTPATIENT
Start: 2022-09-06

## 2022-09-06 RX ORDER — PREDNISONE 10 MG/1
TABLET ORAL
Qty: 32 TABLET | Refills: 0 | Status: SHIPPED | OUTPATIENT
Start: 2022-09-06

## 2022-09-06 RX ORDER — ERGOCALCIFEROL 1.25 MG/1
50000 CAPSULE ORAL
COMMUNITY
End: 2022-09-06

## 2022-09-06 NOTE — TELEPHONE ENCOUNTER
Contacted patient to discus contrast allergy. Pt states she has pretreatment of prednisone 50mg and knows to take doses 13/7/1 hour prior to scan and 50 mg of prednisone 1 hour prior. She states she has done pretx several times and has had no breakthrough allergy symptoms. Her  will accompany as .

## 2022-09-07 ENCOUNTER — TELEPHONE (OUTPATIENT)
Dept: HEMATOLOGY/ONCOLOGY | Facility: HOSPITAL | Age: 62
End: 2022-09-07

## 2022-09-07 NOTE — TELEPHONE ENCOUNTER
Mahamed Alexis is calling to clarify the CT order because the patient have an allergy to contrast. Please call.

## 2022-09-07 NOTE — TELEPHONE ENCOUNTER
Spoke with CT department. Pre-medications already prescribed to patient. CT dept. Verbalized understanding.

## 2022-09-08 ENCOUNTER — TELEPHONE (OUTPATIENT)
Dept: FAMILY MEDICINE CLINIC | Facility: CLINIC | Age: 62
End: 2022-09-08

## 2022-09-08 RX ORDER — CYCLOBENZAPRINE HCL 10 MG
10 TABLET ORAL 3 TIMES DAILY
Qty: 30 TABLET | Refills: 0 | Status: SHIPPED | OUTPATIENT
Start: 2022-09-08 | End: 2022-09-18

## 2022-09-08 RX ORDER — DIAZEPAM 5 MG/1
TABLET ORAL
Qty: 2 TABLET | Refills: 0 | Status: SHIPPED | OUTPATIENT
Start: 2022-09-08

## 2022-09-08 RX ORDER — LORAZEPAM 1 MG/1
TABLET ORAL
Qty: 3 TABLET | Refills: 0 | Status: SHIPPED | OUTPATIENT
Start: 2022-09-08

## 2022-09-08 RX ORDER — TRAMADOL HYDROCHLORIDE 50 MG/1
50 TABLET ORAL EVERY 6 HOURS PRN
Qty: 30 TABLET | Refills: 0 | Status: SHIPPED | OUTPATIENT
Start: 2022-09-08

## 2022-09-08 NOTE — TELEPHONE ENCOUNTER
EDOUARD from [facility] at [address] They will not fill a controlled substance for more than 7 day for an acute problem and you can not give any refills. I called the pt and she asked for the three medications that were sent to [facility]  To be cancelled and sent to the [facility] on [facility]. I called [facility] and spoke to [facility] and cancelled the prescriptions. Prescriptions sent to [facility]. Sherlyn GUAJARDO was notified.

## 2022-09-09 ENCOUNTER — HOSPITAL ENCOUNTER (OUTPATIENT)
Dept: CT IMAGING | Facility: HOSPITAL | Age: 62
Discharge: HOME | End: 2022-09-09
Attending: INTERNAL MEDICINE
Payer: COMMERCIAL

## 2022-09-09 DIAGNOSIS — C21.0 ADENOSQUAMOUS CARCINOMA OF ANUS (HCC): ICD-10-CM

## 2022-09-09 PROCEDURE — 71260 CT THORAX DX C+: CPT | Performed by: INTERNAL MEDICINE

## 2022-09-09 PROCEDURE — 74160 CT ABDOMEN W/CONTRAST: CPT | Performed by: INTERNAL MEDICINE

## 2022-09-09 RX ORDER — IOHEXOL 350 MG/ML
100 INJECTION, SOLUTION INTRAVENOUS
Status: COMPLETED | OUTPATIENT
Start: 2022-09-09 | End: 2022-09-09

## 2022-09-09 RX ADMIN — IOHEXOL 100 ML: 350 INJECTION, SOLUTION INTRAVENOUS at 13:13:00

## 2022-09-14 ENCOUNTER — HOSPITAL ENCOUNTER (OUTPATIENT)
Dept: MRI IMAGING | Facility: HOSPITAL | Age: 62
Discharge: HOME | End: 2022-09-14
Attending: INTERNAL MEDICINE
Payer: COMMERCIAL

## 2022-09-14 DIAGNOSIS — C21.0 ADENOSQUAMOUS CARCINOMA OF ANUS (HCC): ICD-10-CM

## 2022-09-14 PROCEDURE — 72197 MRI PELVIS W/O & W/DYE: CPT | Performed by: INTERNAL MEDICINE

## 2022-09-14 PROCEDURE — A9575 INJ GADOTERATE MEGLUMI 0.1ML: HCPCS | Performed by: INTERNAL MEDICINE

## 2022-09-19 ENCOUNTER — HOSPITAL ENCOUNTER (OUTPATIENT)
Dept: MRI IMAGING | Facility: HOSPITAL | Age: 62
Discharge: HOME | End: 2022-09-19
Attending: NURSE PRACTITIONER

## 2022-09-19 DIAGNOSIS — R29.898 DIFFICULTY IN WEIGHT BEARING: ICD-10-CM

## 2022-09-19 DIAGNOSIS — M54.42 ACUTE BILATERAL LOW BACK PAIN WITH BILATERAL SCIATICA: ICD-10-CM

## 2022-09-19 DIAGNOSIS — M54.41 ACUTE BILATERAL LOW BACK PAIN WITH BILATERAL SCIATICA: ICD-10-CM

## 2022-09-19 DIAGNOSIS — C21.0 ADENOSQUAMOUS CARCINOMA OF ANUS (HCC): ICD-10-CM

## 2022-09-19 PROCEDURE — 72158 MRI LUMBAR SPINE W/O & W/DYE: CPT | Performed by: NURSE PRACTITIONER

## 2022-09-19 PROCEDURE — A9575 INJ GADOTERATE MEGLUMI 0.1ML: HCPCS | Performed by: NURSE PRACTITIONER

## 2022-09-20 ENCOUNTER — PATIENT MESSAGE (OUTPATIENT)
Dept: FAMILY MEDICINE CLINIC | Facility: CLINIC | Age: 62
End: 2022-09-20

## 2022-09-20 ENCOUNTER — OFFICE VISIT (OUTPATIENT)
Dept: FAMILY MEDICINE CLINIC | Facility: CLINIC | Age: 62
End: 2022-09-20

## 2022-09-20 VITALS
TEMPERATURE: 98 F | WEIGHT: 152 LBS | HEART RATE: 88 BPM | HEIGHT: 64.17 IN | BODY MASS INDEX: 25.95 KG/M2 | RESPIRATION RATE: 16 BRPM | DIASTOLIC BLOOD PRESSURE: 72 MMHG | SYSTOLIC BLOOD PRESSURE: 128 MMHG

## 2022-09-20 DIAGNOSIS — R60.0 BILATERAL LOWER EXTREMITY EDEMA: Primary | ICD-10-CM

## 2022-09-20 DIAGNOSIS — R29.898 DIFFICULTY IN WEIGHT BEARING: ICD-10-CM

## 2022-09-20 DIAGNOSIS — M84.48XA SACRAL INSUFFICIENCY FRACTURE, INITIAL ENCOUNTER: ICD-10-CM

## 2022-09-20 PROCEDURE — 99214 OFFICE O/P EST MOD 30 MIN: CPT | Performed by: NURSE PRACTITIONER

## 2022-09-20 PROCEDURE — 3078F DIAST BP <80 MM HG: CPT | Performed by: NURSE PRACTITIONER

## 2022-09-20 PROCEDURE — 3008F BODY MASS INDEX DOCD: CPT | Performed by: NURSE PRACTITIONER

## 2022-09-20 PROCEDURE — 3074F SYST BP LT 130 MM HG: CPT | Performed by: NURSE PRACTITIONER

## 2022-09-20 RX ORDER — HYDROCODONE BITARTRATE AND ACETAMINOPHEN 5; 325 MG/1; MG/1
1-2 TABLET ORAL EVERY 6 HOURS PRN
Qty: 30 TABLET | Refills: 0 | Status: SHIPPED | OUTPATIENT
Start: 2022-09-20

## 2022-09-20 RX ORDER — FUROSEMIDE 20 MG/1
20 TABLET ORAL DAILY
Qty: 7 TABLET | Refills: 0 | Status: SHIPPED | OUTPATIENT
Start: 2022-09-20 | End: 2022-09-26 | Stop reason: ALTCHOICE

## 2022-09-20 RX ORDER — POTASSIUM CHLORIDE 750 MG/1
10 TABLET, FILM COATED, EXTENDED RELEASE ORAL DAILY
Qty: 30 TABLET | Refills: 0 | Status: SHIPPED | OUTPATIENT
Start: 2022-09-20

## 2022-09-20 RX ORDER — NAPROXEN 500 MG/1
500 TABLET ORAL 2 TIMES DAILY WITH MEALS
Qty: 30 TABLET | Refills: 0 | Status: SHIPPED | OUTPATIENT
Start: 2022-09-20

## 2022-09-20 NOTE — TELEPHONE ENCOUNTER
S/w pt on this. No swelling, still has pain in back. Has one tramadol left. Wonders if that was causing the swelling? Per Mali Head she should keep appt to discuss pain control and evaluate what may have caused her swelling. Pt agrees. She will keep appt as scheduled.

## 2022-09-20 NOTE — TELEPHONE ENCOUNTER
From: Torres Necessary  To: CASANDRA Martinez  Sent: 9/20/2022 6:43 AM CDT  Subject: Do I still need to come in today? Desirae. I have been up since 4:30am and I noticed that my ankle and right foot is normal, no swelling. Plus no swelling in my left ankle either. It must start to swelling as the day goes on. I noticed it at night. Since I have multiple fractures in my sacrum and I need to see a neurologist, which I know your office is trying to call doctor Hallie arguello today to get me an appointment as soon as possible, is it necessary that I still see you today?  Seems unnecessary if my feet/ankles are normal.

## 2022-09-25 ENCOUNTER — PATIENT MESSAGE (OUTPATIENT)
Dept: FAMILY MEDICINE CLINIC | Facility: CLINIC | Age: 62
End: 2022-09-25

## 2022-09-26 ENCOUNTER — LAB ENCOUNTER (OUTPATIENT)
Dept: LAB | Age: 62
End: 2022-09-26
Attending: PHYSICIAN ASSISTANT

## 2022-09-26 ENCOUNTER — TELEPHONE (OUTPATIENT)
Dept: SURGERY | Facility: CLINIC | Age: 62
End: 2022-09-26

## 2022-09-26 ENCOUNTER — OFFICE VISIT (OUTPATIENT)
Dept: SURGERY | Facility: CLINIC | Age: 62
End: 2022-09-26

## 2022-09-26 VITALS — HEART RATE: 95 BPM | OXYGEN SATURATION: 98 % | SYSTOLIC BLOOD PRESSURE: 124 MMHG | DIASTOLIC BLOOD PRESSURE: 72 MMHG

## 2022-09-26 DIAGNOSIS — S32.10XG CLOSED FRACTURE OF SACRUM WITH DELAYED HEALING, UNSPECIFIED PORTION OF SACRUM, SUBSEQUENT ENCOUNTER: ICD-10-CM

## 2022-09-26 DIAGNOSIS — M53.3 SACRAL BACK PAIN: ICD-10-CM

## 2022-09-26 DIAGNOSIS — S32.10XG CLOSED FRACTURE OF SACRUM WITH DELAYED HEALING, UNSPECIFIED PORTION OF SACRUM, SUBSEQUENT ENCOUNTER: Primary | ICD-10-CM

## 2022-09-26 LAB
ALBUMIN SERPL-MCNC: 3.9 G/DL (ref 3.4–5)
ALBUMIN/GLOB SERPL: 1.4 {RATIO} (ref 1–2)
ALP LIVER SERPL-CCNC: 115 U/L
ALT SERPL-CCNC: 39 U/L
ANION GAP SERPL CALC-SCNC: 4 MMOL/L (ref 0–18)
AST SERPL-CCNC: 26 U/L (ref 15–37)
BASOPHILS # BLD AUTO: 0.02 X10(3) UL (ref 0–0.2)
BASOPHILS NFR BLD AUTO: 0.5 %
BILIRUB SERPL-MCNC: 0.4 MG/DL (ref 0.1–2)
BUN BLD-MCNC: 22 MG/DL (ref 7–18)
CALCIUM BLD-MCNC: 9.1 MG/DL (ref 8.5–10.1)
CHLORIDE SERPL-SCNC: 111 MMOL/L (ref 98–112)
CO2 SERPL-SCNC: 26 MMOL/L (ref 21–32)
CREAT BLD-MCNC: 0.74 MG/DL
CRP SERPL-MCNC: 0.4 MG/DL (ref ?–0.3)
EOSINOPHIL # BLD AUTO: 0.11 X10(3) UL (ref 0–0.7)
EOSINOPHIL NFR BLD AUTO: 2.8 %
ERYTHROCYTE [DISTWIDTH] IN BLOOD BY AUTOMATED COUNT: 13 %
ERYTHROCYTE [SEDIMENTATION RATE] IN BLOOD: 20 MM/HR
FASTING STATUS PATIENT QL REPORTED: NO
GFR SERPLBLD BASED ON 1.73 SQ M-ARVRAT: 90 ML/MIN/1.73M2 (ref 60–?)
GLOBULIN PLAS-MCNC: 2.8 G/DL (ref 2.8–4.4)
GLUCOSE BLD-MCNC: 105 MG/DL (ref 70–99)
HCT VFR BLD AUTO: 36.3 %
HGB BLD-MCNC: 12 G/DL
IMM GRANULOCYTES # BLD AUTO: 0.01 X10(3) UL (ref 0–1)
IMM GRANULOCYTES NFR BLD: 0.3 %
LYMPHOCYTES # BLD AUTO: 1.07 X10(3) UL (ref 1–4)
LYMPHOCYTES NFR BLD AUTO: 26.9 %
MCH RBC QN AUTO: 31.1 PG (ref 26–34)
MCHC RBC AUTO-ENTMCNC: 33.1 G/DL (ref 31–37)
MCV RBC AUTO: 94 FL
MONOCYTES # BLD AUTO: 0.24 X10(3) UL (ref 0.1–1)
MONOCYTES NFR BLD AUTO: 6 %
NEUTROPHILS # BLD AUTO: 2.53 X10 (3) UL (ref 1.5–7.7)
NEUTROPHILS # BLD AUTO: 2.53 X10(3) UL (ref 1.5–7.7)
NEUTROPHILS NFR BLD AUTO: 63.5 %
OSMOLALITY SERPL CALC.SUM OF ELEC: 296 MOSM/KG (ref 275–295)
PLATELET # BLD AUTO: 237 10(3)UL (ref 150–450)
POTASSIUM SERPL-SCNC: 5.6 MMOL/L (ref 3.5–5.1)
PROT SERPL-MCNC: 6.7 G/DL (ref 6.4–8.2)
RBC # BLD AUTO: 3.86 X10(6)UL
SODIUM SERPL-SCNC: 141 MMOL/L (ref 136–145)
WBC # BLD AUTO: 4 X10(3) UL (ref 4–11)

## 2022-09-26 PROCEDURE — 85025 COMPLETE CBC W/AUTO DIFF WBC: CPT | Performed by: PHYSICIAN ASSISTANT

## 2022-09-26 PROCEDURE — 80053 COMPREHEN METABOLIC PANEL: CPT | Performed by: PHYSICIAN ASSISTANT

## 2022-09-26 PROCEDURE — 86140 C-REACTIVE PROTEIN: CPT | Performed by: PHYSICIAN ASSISTANT

## 2022-09-26 PROCEDURE — 85652 RBC SED RATE AUTOMATED: CPT | Performed by: PHYSICIAN ASSISTANT

## 2022-09-26 PROCEDURE — 99215 OFFICE O/P EST HI 40 MIN: CPT | Performed by: PHYSICIAN ASSISTANT

## 2022-09-26 PROCEDURE — 3078F DIAST BP <80 MM HG: CPT | Performed by: PHYSICIAN ASSISTANT

## 2022-09-26 PROCEDURE — 3074F SYST BP LT 130 MM HG: CPT | Performed by: PHYSICIAN ASSISTANT

## 2022-09-26 RX ORDER — HYDROCODONE BITARTRATE AND ACETAMINOPHEN 10; 325 MG/1; MG/1
1 TABLET ORAL EVERY 4 HOURS PRN
Qty: 90 TABLET | Refills: 0 | Status: SHIPPED | OUTPATIENT
Start: 2022-09-26

## 2022-09-26 RX ORDER — ERGOCALCIFEROL 1.25 MG/1
CAPSULE ORAL WEEKLY
COMMUNITY

## 2022-09-26 NOTE — TELEPHONE ENCOUNTER
From: Tg Kaur  To: Nader Griffithaileen, APRNITO  Sent: 9/25/2022 8:03 PM CDT  Subject: Fluid is all gone. Branden Merrill. I know you were squeezing me into your schedule today. I no longer have any fluid on my feet or legs. So I am canceling my follow up appointment today. I can send you a photo of my feet or legs if you need to see anything. If it returns I will definitely contact the office to get in to see you. I have my appointment with the neurosurgeon today. They had a cancelation and they can get me in. I'll make sure they loop you in on my office visit and the plan for my back. Thank you.

## 2022-09-26 NOTE — TELEPHONE ENCOUNTER
Patient has appointment today with Emeka Contreras. Patient requested handicap parking placard paperwork to be completed. Called and spoke to the patient that the paperwork is completed and ready for  at . Patient states her spouse will pick it up tomorrow.     Sent copy for scanning

## 2022-09-27 DIAGNOSIS — E87.5 HYPERKALEMIA: Primary | ICD-10-CM

## 2022-10-05 ENCOUNTER — HOSPITAL ENCOUNTER (OUTPATIENT)
Dept: MRI IMAGING | Facility: HOSPITAL | Age: 62
Discharge: HOME | End: 2022-10-05
Attending: PHYSICIAN ASSISTANT
Payer: COMMERCIAL

## 2022-10-05 ENCOUNTER — TELEPHONE (OUTPATIENT)
Dept: ADMINISTRATIVE | Facility: HOSPITAL | Age: 62
End: 2022-10-05

## 2022-10-05 DIAGNOSIS — M53.3 SACRAL BACK PAIN: ICD-10-CM

## 2022-10-05 DIAGNOSIS — S32.10XG CLOSED FRACTURE OF SACRUM WITH DELAYED HEALING, UNSPECIFIED PORTION OF SACRUM, SUBSEQUENT ENCOUNTER: ICD-10-CM

## 2022-10-05 PROCEDURE — A9575 INJ GADOTERATE MEGLUMI 0.1ML: HCPCS | Performed by: PHYSICIAN ASSISTANT

## 2022-10-05 PROCEDURE — 72197 MRI PELVIS W/O & W/DYE: CPT | Performed by: PHYSICIAN ASSISTANT

## 2022-10-05 NOTE — TELEPHONE ENCOUNTER
Good Afternoon, Please be advise that [facility] is requesting Dr. Priya Gabriel do a Peer to Peer for the MRI SACRUM/COCCYX (W+WO)(CPT=72398). RBU#[phone] [facility]#[id number].     Thanks,  [employer]

## 2022-10-10 NOTE — TELEPHONE ENCOUNTER
Called to attempt to schedule P2P- provider must call directly and speak with [facility] Physician Reviewer. This can not be scheduled in advance. [facility]#[phone] option #1, option #1, option #2     [id number].

## 2022-11-03 ENCOUNTER — OFFICE VISIT (OUTPATIENT)
Facility: LOCATION | Age: 62
End: 2022-11-03
Payer: COMMERCIAL

## 2022-11-03 VITALS — TEMPERATURE: 98 F | HEART RATE: 87 BPM

## 2022-11-03 DIAGNOSIS — C21.0 ANAL CANCER (HCC): ICD-10-CM

## 2022-11-03 DIAGNOSIS — D12.6 ADENOMATOUS POLYP OF COLON, UNSPECIFIED PART OF COLON: ICD-10-CM

## 2022-11-03 DIAGNOSIS — C21.0 ADENOSQUAMOUS CARCINOMA OF ANUS (HCC): Primary | ICD-10-CM

## 2022-11-03 PROCEDURE — 99215 OFFICE O/P EST HI 40 MIN: CPT | Performed by: COLON & RECTAL SURGERY

## 2022-11-03 PROCEDURE — 46600 DIAGNOSTIC ANOSCOPY SPX: CPT | Performed by: COLON & RECTAL SURGERY

## 2022-11-03 RX ORDER — POLYETHYLENE GLYCOL 3350, SODIUM CHLORIDE, SODIUM BICARBONATE, POTASSIUM CHLORIDE 420; 11.2; 5.72; 1.48 G/4L; G/4L; G/4L; G/4L
POWDER, FOR SOLUTION ORAL
Qty: 1 EACH | Refills: 0 | Status: SHIPPED | OUTPATIENT
Start: 2022-11-03

## 2022-11-03 NOTE — PATIENT INSTRUCTIONS
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 She underwent excision on 10/14/20. She completed chemoradiation on 1/8/21. She is approximately 8 months post treatment. CEA 0.4 on August 31, 2022. Pathology report still listed as a poorly differentiated squamous cancer, the patient however and other notes, indicate this was found to eventually be an adenosquamous cancer of the anus. It was treated with short course chemotherapy associated with radiation therapy. The patient states she does have a prior history of colon polyps. They were noted on colonoscopy by Dr. Dominick Zarco on May 5, 2015. None were malignant, there were only adenomas present. The patient has no current bright red blood per rectum or melena. She states she still gets some mucus in the stool and narrowing of the stool that she states is related to her treatment of her carcinoma of the anus. She has no abdominal pain or distention. She has not suffered weight loss as a symptom. She has never been diagnosed with Crohn disease, ulcerative colitis, or irritable bowel syndrome. Her paternal grandmother had colon cancer at age 48. Her maternal aunts and mother have had colon polyps. The same maternal grandmother that had colon cancer also had cancer of the uterus, and breast cancer. The patient herself has not had any prior abdominal operations. She has never had heart attack, stroke, heart murmur, heart valve replacement, or cardiac arrhythmia. She is not on any blood thinners. Clinical exam reveals the anus to have a right lateral scar. There are no signs of recurrent or residual ulcerations or cancer. The region is soft. There are no palpable lesions within the rectovaginal septum. The anal sphincter complex is free of any disease or nodules. There are no visible radiation changes at this point which is slightly unusual for her form of treatment.   Sphincter tone is 2/4 basal, 2/4 maximum squeeze pressure. There is a minimal anterior rectocele. There is no other evidence of proctitis or Crohn's disease. This patient appears stable and free of any evidence of recurrent or residual disease at the anus. I will be performing colonoscopy on this patient. She is currently scheduled for colonoscopy December 16, 2022. I will also be seeing this patient every 3 months for the first 3 years after her original treatment, then every 6 months thereafter out to 5 years. We will then see her once yearly. She wants me to take over her colonoscopy and transfer care here at [facility].    All risks, benefits, complications and alternatives to the proposed procedure(s) were fully discussed with the patient. All questions from the patient were answered in detail. A description of the procedure(s) and possible outcomes was fully discussed. The patient seemed to understand the conversation and its details. Consent for the procedure(s) was confirmed with the patient.

## 2022-11-04 PROBLEM — C21.0 ANAL CANCER (HCC): Status: ACTIVE | Noted: 2022-11-04

## 2022-11-04 PROBLEM — D12.6 ADENOMATOUS POLYP OF COLON: Status: ACTIVE | Noted: 2022-11-04

## 2022-11-16 RX ORDER — IBANDRONATE SODIUM 150 MG/1
TABLET, FILM COATED ORAL
Qty: 3 TABLET | Refills: 0 | Status: SHIPPED | OUTPATIENT
Start: 2022-11-16

## 2022-11-16 NOTE — TELEPHONE ENCOUNTER
LOV: 11/1/21 (CPX) 9/20/22 ([facility])  Last Refill: 11/1/21, #3, 3 RF  Next OV: 1/20/23    Please authorize if acceptable. Thank you!

## 2022-12-01 ENCOUNTER — TELEPHONE (OUTPATIENT)
Facility: LOCATION | Age: 62
End: 2022-12-01

## 2022-12-01 NOTE — TELEPHONE ENCOUNTER
Initiated authorization for Colonoscopy CPT 96182 with [employer]  Status: Approved-authorization is not required per health plan

## 2022-12-16 ENCOUNTER — SURGERY CENTER DOCUMENTATION (OUTPATIENT)
Dept: SURGERY | Age: 62
End: 2022-12-16

## 2022-12-16 NOTE — PROCEDURES
[facility]  Op Note    Malinda Mullen Location: OR   CSN [id number] MRN [id number]   Admission Date (Not on file) Operation Date 12/16/2022   Attending Physician No att. providers found Operating Physician Reji Dueñas MD     Pre-Operative Diagnosis: Adenosquamous cell cancer of the anus, prior colon polyps    Post-Operative Diagnosis: Radiation proctitis, diverticulosis    Procedure Performed: Colonoscopy    Surgeon: Reji Dueñas MD    Anesthesia: MAC      History of present illness:  Adenosquamous cell cancer of the anus, chemo and XRT finished 1/8/21. Prior polyps    Operative findings:  Minimal radiation proctitis, diverticulosis in the distal descending and sigmoid colon. Small pocket diverticulosis numbering around 50. Operative summary:  Following adequate IV sedation, the patient was placed in the left lateral decubitus position on the operating room table. Digital rectal examination was performed. The colonoscope was inserted, and passed to the end of the colon. Upon withdrawal of the scope, the prep was rated as follows and the [facility] bowel prep scale:  3    0. Unprepared colon segment with stool but cannot be cleared  1. Portion of mucosa in segments seen after cleaning, but other areas not seen because of retained material  2. Minor residual material after cleaning, but mucosa of segment generally well seen  3. Entire mucosa of segment well seen after cleaning        Digital rectal examination was performed, the rectal exam had the following findings:   Radiation fibrosis, palpable scar, no recurrence    Landmarks were identified confirming the location of the colonoscope in the cecum, including the transverse cecal fold, and the ileocecal valve. Upon withdrawal of the scope, the patient had the following findings:  The patient does not have polyps. The patient does have diverticulosis. The patient does not have inflammation.      This patient has not had a previous colon resection. The scope was retroverted in the anal canal, the anal canal had the following findings:    The patient does not have internal hemorrhoids. The patient does not have anal canal polyps. The patient does not have hypertrophied anal papillae. The procedures performed during the procedure other than colonoscopy are listed as follows:  None     The scope was withdrawn, the patient was taken to the recovery room in stable postoperative condition. Pathologic specimens:  None     Complications: None      Addendum: This patient will require further colonoscopy in 3 years based on the history of adenosquamous carcinoma of the anus.        Malgorzata Chapman MD  12/16/2022  9:48 AM

## 2022-12-22 NOTE — TELEPHONE ENCOUNTER
[employer] message sent to patient with pre-op instructions and information.     PCP request letter sent to Dr. Magui Kaba at fax number:    [phone]      Fax sent to Jadyn Saini at [address] [place] [address] him that patient has BGS from previous procedure, but would like rep How Severe Are They?: mild Is This A New Presentation, Or A Follow-Up?: Skin Lesions

## 2022-12-29 ENCOUNTER — HOSPITAL ENCOUNTER (OUTPATIENT)
Dept: MAMMOGRAPHY | Facility: HOSPITAL | Age: 62
Discharge: HOME | End: 2022-12-29
Attending: FAMILY MEDICINE
Payer: COMMERCIAL

## 2022-12-29 DIAGNOSIS — Z12.31 ENCOUNTER FOR SCREENING MAMMOGRAM FOR BREAST CANCER: ICD-10-CM

## 2022-12-29 PROCEDURE — 77063 BREAST TOMOSYNTHESIS BI: CPT | Performed by: FAMILY MEDICINE

## 2022-12-29 PROCEDURE — 77067 SCR MAMMO BI INCL CAD: CPT | Performed by: FAMILY MEDICINE

## 2023-01-20 ENCOUNTER — OFFICE VISIT (OUTPATIENT)
Dept: FAMILY MEDICINE CLINIC | Facility: CLINIC | Age: 63
End: 2023-01-20
Payer: COMMERCIAL

## 2023-01-20 VITALS
SYSTOLIC BLOOD PRESSURE: 124 MMHG | RESPIRATION RATE: 16 BRPM | TEMPERATURE: 97 F | DIASTOLIC BLOOD PRESSURE: 76 MMHG | WEIGHT: 151 LBS | OXYGEN SATURATION: 99 % | BODY MASS INDEX: 25.78 KG/M2 | HEIGHT: 64 IN | HEART RATE: 90 BPM

## 2023-01-20 DIAGNOSIS — M81.0 OSTEOPOROSIS, UNSPECIFIED OSTEOPOROSIS TYPE, UNSPECIFIED PATHOLOGICAL FRACTURE PRESENCE: ICD-10-CM

## 2023-01-20 DIAGNOSIS — G89.29 CHRONIC NONINTRACTABLE HEADACHE, UNSPECIFIED HEADACHE TYPE: ICD-10-CM

## 2023-01-20 DIAGNOSIS — Z00.00 ROUTINE GENERAL MEDICAL EXAMINATION AT A HEALTH CARE FACILITY: Primary | ICD-10-CM

## 2023-01-20 DIAGNOSIS — R60.0 LEG EDEMA: ICD-10-CM

## 2023-01-20 DIAGNOSIS — R51.9 CHRONIC NONINTRACTABLE HEADACHE, UNSPECIFIED HEADACHE TYPE: ICD-10-CM

## 2023-01-20 DIAGNOSIS — R25.2 LEG CRAMPS: ICD-10-CM

## 2023-01-20 DIAGNOSIS — E55.9 VITAMIN D DEFICIENCY: ICD-10-CM

## 2023-01-20 DIAGNOSIS — Z13.89 SCREENING FOR GENITOURINARY CONDITION: ICD-10-CM

## 2023-01-20 PROCEDURE — 3078F DIAST BP <80 MM HG: CPT | Performed by: FAMILY MEDICINE

## 2023-01-20 PROCEDURE — 99396 PREV VISIT EST AGE 40-64: CPT | Performed by: FAMILY MEDICINE

## 2023-01-20 PROCEDURE — 99214 OFFICE O/P EST MOD 30 MIN: CPT | Performed by: FAMILY MEDICINE

## 2023-01-20 PROCEDURE — 3008F BODY MASS INDEX DOCD: CPT | Performed by: FAMILY MEDICINE

## 2023-01-20 PROCEDURE — 3074F SYST BP LT 130 MM HG: CPT | Performed by: FAMILY MEDICINE

## 2023-01-20 RX ORDER — PREGABALIN 50 MG/1
CAPSULE ORAL
Qty: 30 CAPSULE | Refills: 0 | Status: SHIPPED | OUTPATIENT
Start: 2023-01-20

## 2023-01-20 NOTE — PATIENT INSTRUCTIONS
Forward a copy of your old handicap form so we can complete the new form. Talk to Dr. Smiley Peace to let him know I'd like your leg veins evaluated for varicose veins that could be contributing to your swelling. Start wearing your compression stockings also.

## 2023-01-23 ENCOUNTER — PATIENT MESSAGE (OUTPATIENT)
Dept: FAMILY MEDICINE CLINIC | Facility: CLINIC | Age: 63
End: 2023-01-23

## 2023-01-27 ENCOUNTER — LAB ENCOUNTER (OUTPATIENT)
Dept: LAB | Age: 63
End: 2023-01-27
Attending: FAMILY MEDICINE
Payer: COMMERCIAL

## 2023-01-27 DIAGNOSIS — C21.0 ADENOSQUAMOUS CARCINOMA OF ANUS (HCC): ICD-10-CM

## 2023-01-27 DIAGNOSIS — E55.9 VITAMIN D DEFICIENCY: Primary | ICD-10-CM

## 2023-01-27 DIAGNOSIS — Z13.89 SCREENING FOR GENITOURINARY CONDITION: ICD-10-CM

## 2023-01-27 DIAGNOSIS — M81.0 OSTEOPOROSIS, UNSPECIFIED OSTEOPOROSIS TYPE, UNSPECIFIED PATHOLOGICAL FRACTURE PRESENCE: ICD-10-CM

## 2023-01-27 DIAGNOSIS — R25.2 LEG CRAMPS: ICD-10-CM

## 2023-01-27 DIAGNOSIS — E55.9 VITAMIN D DEFICIENCY: ICD-10-CM

## 2023-01-27 DIAGNOSIS — Z00.00 ROUTINE GENERAL MEDICAL EXAMINATION AT A HEALTH CARE FACILITY: ICD-10-CM

## 2023-01-27 LAB
ALBUMIN SERPL-MCNC: 3.5 G/DL (ref 3.4–5)
ALBUMIN/GLOB SERPL: 1 {RATIO} (ref 1–2)
ALP LIVER SERPL-CCNC: 71 U/L
ALT SERPL-CCNC: 46 U/L
ANION GAP SERPL CALC-SCNC: 3 MMOL/L (ref 0–18)
AST SERPL-CCNC: 28 U/L (ref 15–37)
BASOPHILS # BLD AUTO: 0.03 X10(3) UL (ref 0–0.2)
BASOPHILS NFR BLD AUTO: 0.9 %
BILIRUB SERPL-MCNC: 0.4 MG/DL (ref 0.1–2)
BILIRUB UR QL STRIP.AUTO: NEGATIVE
BUN BLD-MCNC: 23 MG/DL (ref 7–18)
CALCIUM BLD-MCNC: 8.9 MG/DL (ref 8.5–10.1)
CHLORIDE SERPL-SCNC: 110 MMOL/L (ref 98–112)
CHOLEST SERPL-MCNC: 211 MG/DL (ref ?–200)
CLARITY UR REFRACT.AUTO: CLEAR
CO2 SERPL-SCNC: 26 MMOL/L (ref 21–32)
COLOR UR AUTO: YELLOW
CREAT BLD-MCNC: 0.82 MG/DL
EOSINOPHIL # BLD AUTO: 0.07 X10(3) UL (ref 0–0.7)
EOSINOPHIL NFR BLD AUTO: 2 %
ERYTHROCYTE [DISTWIDTH] IN BLOOD BY AUTOMATED COUNT: 13.7 %
FASTING PATIENT LIPID ANSWER: YES
FASTING STATUS PATIENT QL REPORTED: YES
GFR SERPLBLD BASED ON 1.73 SQ M-ARVRAT: 80 ML/MIN/1.73M2 (ref 60–?)
GLOBULIN PLAS-MCNC: 3.4 G/DL (ref 2.8–4.4)
GLUCOSE BLD-MCNC: 102 MG/DL (ref 70–99)
GLUCOSE UR STRIP.AUTO-MCNC: NEGATIVE MG/DL
HCT VFR BLD AUTO: 40.2 %
HDLC SERPL-MCNC: 72 MG/DL (ref 40–59)
HGB BLD-MCNC: 13.5 G/DL
IMM GRANULOCYTES # BLD AUTO: 0.01 X10(3) UL (ref 0–1)
IMM GRANULOCYTES NFR BLD: 0.3 %
KETONES UR STRIP.AUTO-MCNC: NEGATIVE MG/DL
LDLC SERPL CALC-MCNC: 126 MG/DL (ref ?–100)
LEUKOCYTE ESTERASE UR QL STRIP.AUTO: NEGATIVE
LYMPHOCYTES # BLD AUTO: 1.09 X10(3) UL (ref 1–4)
LYMPHOCYTES NFR BLD AUTO: 31 %
MAGNESIUM SERPL-MCNC: 2.3 MG/DL (ref 1.6–2.6)
MCH RBC QN AUTO: 31 PG (ref 26–34)
MCHC RBC AUTO-ENTMCNC: 33.6 G/DL (ref 31–37)
MCV RBC AUTO: 92.2 FL
MONOCYTES # BLD AUTO: 0.16 X10(3) UL (ref 0.1–1)
MONOCYTES NFR BLD AUTO: 4.5 %
NEUTROPHILS # BLD AUTO: 2.16 X10 (3) UL (ref 1.5–7.7)
NEUTROPHILS # BLD AUTO: 2.16 X10(3) UL (ref 1.5–7.7)
NEUTROPHILS NFR BLD AUTO: 61.3 %
NITRITE UR QL STRIP.AUTO: NEGATIVE
NONHDLC SERPL-MCNC: 139 MG/DL (ref ?–130)
OSMOLALITY SERPL CALC.SUM OF ELEC: 292 MOSM/KG (ref 275–295)
PH UR STRIP.AUTO: 5 [PH] (ref 5–8)
PLATELET # BLD AUTO: 229 10(3)UL (ref 150–450)
POTASSIUM SERPL-SCNC: 5.4 MMOL/L (ref 3.5–5.1)
PROT SERPL-MCNC: 6.9 G/DL (ref 6.4–8.2)
PROT UR STRIP.AUTO-MCNC: NEGATIVE MG/DL
RBC # BLD AUTO: 4.36 X10(6)UL
SODIUM SERPL-SCNC: 139 MMOL/L (ref 136–145)
SP GR UR STRIP.AUTO: 1.02 (ref 1–1.03)
TRIGL SERPL-MCNC: 76 MG/DL (ref 30–149)
TSI SER-ACNC: 1.96 MIU/ML (ref 0.36–3.74)
UROBILINOGEN UR STRIP.AUTO-MCNC: 0.2 MG/DL
VIT D+METAB SERPL-MCNC: 19.7 NG/ML (ref 30–100)
VLDLC SERPL CALC-MCNC: 13 MG/DL (ref 0–30)
WBC # BLD AUTO: 3.5 X10(3) UL (ref 4–11)

## 2023-01-27 PROCEDURE — 83735 ASSAY OF MAGNESIUM: CPT | Performed by: FAMILY MEDICINE

## 2023-01-27 PROCEDURE — 81001 URINALYSIS AUTO W/SCOPE: CPT | Performed by: FAMILY MEDICINE

## 2023-01-27 PROCEDURE — 80050 GENERAL HEALTH PANEL: CPT | Performed by: FAMILY MEDICINE

## 2023-01-27 PROCEDURE — 80061 LIPID PANEL: CPT | Performed by: FAMILY MEDICINE

## 2023-01-27 PROCEDURE — 82306 VITAMIN D 25 HYDROXY: CPT | Performed by: FAMILY MEDICINE

## 2023-01-27 RX ORDER — ERGOCALCIFEROL 1.25 MG/1
50000 CAPSULE ORAL WEEKLY
Qty: 8 CAPSULE | Refills: 0 | Status: SHIPPED | OUTPATIENT
Start: 2023-01-27 | End: 2023-03-24

## 2023-01-28 ENCOUNTER — MED REC SCAN ONLY (OUTPATIENT)
Dept: FAMILY MEDICINE CLINIC | Facility: CLINIC | Age: 63
End: 2023-01-28

## 2023-01-30 ENCOUNTER — TELEPHONE (OUTPATIENT)
Dept: HEMATOLOGY/ONCOLOGY | Facility: HOSPITAL | Age: 63
End: 2023-01-30

## 2023-01-30 DIAGNOSIS — C21.0 ADENOSQUAMOUS CARCINOMA OF ANUS (HCC): Primary | ICD-10-CM

## 2023-01-30 LAB — CEA SERPL-MCNC: 0.4 NG/ML (ref ?–5)

## 2023-01-30 NOTE — TELEPHONE ENCOUNTER
Received voicemail from patient. States she had labs performed with PCP office last week. Would like Dr. Eliza Ross to review them. She will see MD in one month. Wants to confirm she does not need to be seen sooner than that based off of lab results. MD notified.

## 2023-01-30 NOTE — TELEPHONE ENCOUNTER
Spoke with patient. She verbalized understanding. Will keep appointment as is. Labs look ok. I added a CEA.  Thanks

## 2023-02-06 ENCOUNTER — HOSPITAL ENCOUNTER (OUTPATIENT)
Dept: CV DIAGNOSTICS | Facility: HOSPITAL | Age: 63
Discharge: HOME | End: 2023-02-06
Attending: FAMILY MEDICINE
Payer: COMMERCIAL

## 2023-02-06 DIAGNOSIS — R60.0 LEG EDEMA: ICD-10-CM

## 2023-02-06 PROBLEM — I34.0 NONRHEUMATIC MITRAL VALVE REGURGITATION: Status: ACTIVE | Noted: 2023-02-06

## 2023-02-06 PROCEDURE — 93306 TTE W/DOPPLER COMPLETE: CPT | Performed by: FAMILY MEDICINE

## 2023-02-06 RX ORDER — PREGABALIN 100 MG/1
CAPSULE ORAL
Qty: 90 CAPSULE | Refills: 0 | Status: SHIPPED | OUTPATIENT
Start: 2023-02-06

## 2023-02-06 NOTE — TELEPHONE ENCOUNTER
From: Chapis Gabriel  To: Harshil Kat PA-C  Sent: 1/23/2023 9:18 PM CST  Subject: Follow up info for renewal of handicap permit     Here is a copy of the original information that Petey Gomes filled out for me so that I could get a six month temporary disability certificate for parking placard   . Mercedes Singh has my current placard information and I have signed the front part of the form. Thank you for asking for another 6 months. For my Bilateral sacral fractures as I continue to heal.   Let me know if you need anything else.    Thanks [employer]

## 2023-02-07 ENCOUNTER — MED REC SCAN ONLY (OUTPATIENT)
Dept: FAMILY MEDICINE CLINIC | Facility: CLINIC | Age: 63
End: 2023-02-07

## 2023-02-07 DIAGNOSIS — I34.0 MILD MITRAL REGURGITATION: Primary | ICD-10-CM

## 2023-02-16 ENCOUNTER — OFFICE VISIT (OUTPATIENT)
Facility: LOCATION | Age: 63
End: 2023-02-16
Payer: COMMERCIAL

## 2023-02-16 VITALS — TEMPERATURE: 99 F | HEART RATE: 65 BPM

## 2023-02-16 DIAGNOSIS — D12.6 ADENOMATOUS POLYP OF COLON, UNSPECIFIED PART OF COLON: ICD-10-CM

## 2023-02-16 DIAGNOSIS — R60.0 LOWER EXTREMITY EDEMA: ICD-10-CM

## 2023-02-16 DIAGNOSIS — C21.0 ADENOSQUAMOUS CARCINOMA OF ANUS (HCC): Primary | ICD-10-CM

## 2023-02-16 DIAGNOSIS — R15.9 ANAL SPHINCTER INCONTINENCE: ICD-10-CM

## 2023-02-16 PROCEDURE — 46600 DIAGNOSTIC ANOSCOPY SPX: CPT | Performed by: COLON & RECTAL SURGERY

## 2023-02-16 PROCEDURE — 99417 PROLNG OP E/M EACH 15 MIN: CPT | Performed by: COLON & RECTAL SURGERY

## 2023-02-16 PROCEDURE — 99215 OFFICE O/P EST HI 40 MIN: CPT | Performed by: COLON & RECTAL SURGERY

## 2023-02-17 PROBLEM — R60.0 LOWER EXTREMITY EDEMA: Status: ACTIVE | Noted: 2023-02-17

## 2023-02-17 PROBLEM — R15.9 ANAL SPHINCTER INCONTINENCE: Status: ACTIVE | Noted: 2023-02-17

## 2023-02-17 NOTE — PATIENT INSTRUCTIONS
This patient presents for further care and treatment regarding a T2N0 adenosquamous carcinoma. She underwent excision on 10/14/20. She completed chemoradiation on 1/8/21. Pathology report states this was a poorly differentiated squamous cancer, other notes indicate this was found to eventually be an adenosquamous cancer of the anus. It was treated with short course chemotherapy associated with radiation therapy. The patient states she does have a prior history of colon polyps. The patient has no complaints of anal incontinence. She has mucus in the stool. She has to wear a pad. She states she has accidents even with solid stool. The patient had a normal spontaneous vaginal delivery. She states she had greater than 100 stitches to repair. The patient states her primary problem is that she cannot feel when bowel movements are present. She has lost sensation to the anus and rectum. She is unaware when stool was passing. She also has new onset of bilateral lower extremity edema. The primary care service is asking us to look into this for the patient. The patient has no real varicose veins. She has received the radiation to the pelvic area. This is new onset. She has no cardiac history that she is aware of. She has never had a DVT or pulmonary embolus. She has no clotting disorders. Clinical exam of the anus including anoscopy reveals her to have 1/4 basal tone, 2/4 maximum squeeze pressure. There are no significant signs of radiation dermatitis. There is a moderate anterior rectocele present. There are no external hemorrhoids. The patient does have a positive anal wink, and a weak cough reflex. There is some very minimal residual radiation proctitis right at the dentate line. There is no evidence of recurrence of her adenosquamous carcinoma of the anus. There are no HPV lesions, AIN lesions, ulcerations, or other suspicious lesions.   Rectovaginal septum shows no mass lesions, the anal sphincter circumferentially shows no mass lesions. I took the opportunity today to do anal and rectal biofeedback during my examination. I taught the patient 2 separate exercises including flicks, and long contractions. She will practice these up to 10 times daily. Clinical exam of the lower extremities reveals no significant varicosities on the calf regions bilaterally. There is some very slight nonpitting edema to the lower extremities bilaterally. There are no ulcerations. There is no cellulitis. There are good peripheral pulses. Regarding the anal sphincter incontinence, the patient was taught the anal and rectal biofeedback. I also spent a significant amount of time explaining to her bowel training. I gave her an instruction sheet regarding this as well as the instructions. We will check back with her on May 15, 2023, at her next scheduled appointment. This should greatly help her with her anal incontinence. There are no sphincter defects that would require surgical repair at this time. Regarding the lymphedema, there are no external varicosities, however incompetent valves cannot be ruled out. We will do a bilateral lower extremity varicose vein evaluation Doppler examination. If it is normal, then the edema is not secondary to varicosities. If there is abnormalities, she should make a follow-up appointment after she gets results on \"MyChart\". Regarding her adenosquamous cancer of the anus, no recurrence is identified. We will see her again in 3 months for further care and treatment.

## 2023-02-20 RX ORDER — IBANDRONATE SODIUM 150 MG/1
150 TABLET, FILM COATED ORAL
Qty: 3 TABLET | Refills: 0 | Status: SHIPPED | OUTPATIENT
Start: 2023-02-20

## 2023-03-01 ENCOUNTER — APPOINTMENT (OUTPATIENT)
Dept: HEMATOLOGY/ONCOLOGY | Facility: HOSPITAL | Age: 63
End: 2023-03-01
Attending: INTERNAL MEDICINE
Payer: COMMERCIAL

## 2023-03-06 NOTE — TELEPHONE ENCOUNTER
Returned pt's call, she wanted to know how long surgery would take and what time she should arrive for surgery. Informed pt she is currently slotted for approximately 3hrs not including pre-op holding and recovery.  Also informed her she is currently mami No

## 2023-03-07 ENCOUNTER — OFFICE VISIT (OUTPATIENT)
Dept: HEMATOLOGY/ONCOLOGY | Facility: HOSPITAL | Age: 63
End: 2023-03-07
Attending: INTERNAL MEDICINE
Payer: COMMERCIAL

## 2023-03-07 VITALS
SYSTOLIC BLOOD PRESSURE: 129 MMHG | DIASTOLIC BLOOD PRESSURE: 73 MMHG | WEIGHT: 157.13 LBS | HEIGHT: 63.86 IN | OXYGEN SATURATION: 99 % | BODY MASS INDEX: 27.16 KG/M2 | HEART RATE: 90 BPM | TEMPERATURE: 97 F | RESPIRATION RATE: 18 BRPM

## 2023-03-07 DIAGNOSIS — C21.0 ANAL CANCER (HCC): Primary | ICD-10-CM

## 2023-03-07 PROCEDURE — 99214 OFFICE O/P EST MOD 30 MIN: CPT | Performed by: INTERNAL MEDICINE

## 2023-03-07 RX ORDER — PREDNISONE 50 MG/1
50 TABLET ORAL AS DIRECTED
Qty: 3 TABLET | Refills: 0 | Status: SHIPPED | OUTPATIENT
Start: 2023-03-07

## 2023-03-07 RX ORDER — DIPHENHYDRAMINE HCL 25 MG
50 CAPSULE ORAL AS DIRECTED
Qty: 2 CAPSULE | Refills: 0 | Status: SHIPPED | OUTPATIENT
Start: 2023-03-07

## 2023-03-07 NOTE — PROGRESS NOTES
Education Record    Learner:  Patient    Disease / Diagnosis: hx anal CA    Barriers / Limitations:  None   Comments:    Method:  Discussion   Comments:    General Topics:  Plan of care reviewed   Comments:    Outcome:  Shows understanding   Comments:    Here for follow up. Still having her normal \"bathroom\" issues- seeing Dr Caitlin Bhakta for solutions about this, as well as having BLE fluid retention and speaking with Dr Caitlin Bhakta about this as well. Trying Lyrica for chronic pain.  Energy is back to normal. Appetite is back to normal.

## 2023-03-15 ENCOUNTER — HOSPITAL ENCOUNTER (OUTPATIENT)
Dept: ULTRASOUND IMAGING | Facility: HOSPITAL | Age: 63
Discharge: HOME | End: 2023-03-15
Attending: COLON & RECTAL SURGERY
Payer: COMMERCIAL

## 2023-03-15 DIAGNOSIS — R60.0 LOWER EXTREMITY EDEMA: ICD-10-CM

## 2023-03-15 PROCEDURE — 93970 EXTREMITY STUDY: CPT | Performed by: COLON & RECTAL SURGERY

## 2023-04-24 ENCOUNTER — PATIENT MESSAGE (OUTPATIENT)
Dept: FAMILY MEDICINE CLINIC | Facility: CLINIC | Age: 63
End: 2023-04-24

## 2023-04-24 DIAGNOSIS — M79.673 PAIN OF FOOT, UNSPECIFIED LATERALITY: Primary | ICD-10-CM

## 2023-04-24 NOTE — TELEPHONE ENCOUNTER
LOV 1/20/23. Pt continues to have constant right heel  pain. Denies swelling at this time. She is requesting an ortho referral- pended.     Routed to ANDREW Fung

## 2023-04-24 NOTE — TELEPHONE ENCOUNTER
From: Pito Sprague  To: Elaine Palomo PA-C  Sent: 4/24/2023 10:31 AM CDT  Subject: Referral request    My foot Hurts all the time, specially where my heel meets my arch on the bottom of my foot. I want a referral for an orthopedic doctor.

## 2023-04-25 ENCOUNTER — TELEPHONE (OUTPATIENT)
Dept: ORTHOPEDICS CLINIC | Facility: CLINIC | Age: 63
End: 2023-04-25

## 2023-04-25 DIAGNOSIS — R52 PAIN: Primary | ICD-10-CM

## 2023-04-25 NOTE — TELEPHONE ENCOUNTER
Patient scheduled with Maribel Leon for rt hand possible trigger finger. No recent imaging in epic.      Future Appointments   Date Time Provider Carlos Cowart   5/4/2023  1:20 PM Lupe Ramos [facility]   5/9/2023  1:00 PM Mely Borrego DPM [facility]   5/15/2023  9:45 AM Bridger Ang MD [facility]

## 2023-04-25 NOTE — TELEPHONE ENCOUNTER
Patient scheduled with Dr. Gayatri Wang for rt foot pain. No recent imaging in epic.     Future Appointments   Date Time Provider Carlos Sofya   5/4/2023  1:20 PM Lupe Mcbride [facility]   5/9/2023  1:00 PM Radha Orozco DPM [facility]   5/15/2023  9:45 AM Jaron Bryant MD [facility]

## 2023-05-04 ENCOUNTER — OFFICE VISIT (OUTPATIENT)
Dept: ORTHOPEDICS CLINIC | Facility: CLINIC | Age: 63
End: 2023-05-04
Payer: COMMERCIAL

## 2023-05-04 ENCOUNTER — PATIENT OUTREACH (OUTPATIENT)
Facility: LOCATION | Age: 63
End: 2023-05-04

## 2023-05-04 VITALS — BODY MASS INDEX: 27.13 KG/M2 | WEIGHT: 157 LBS | HEIGHT: 63.86 IN

## 2023-05-04 DIAGNOSIS — M65.341 TRIGGER RING FINGER OF RIGHT HAND: Primary | ICD-10-CM

## 2023-05-04 RX ORDER — BETAMETHASONE SODIUM PHOSPHATE AND BETAMETHASONE ACETATE 3; 3 MG/ML; MG/ML
6 INJECTION, SUSPENSION INTRA-ARTICULAR; INTRALESIONAL; INTRAMUSCULAR; SOFT TISSUE ONCE
Status: COMPLETED | OUTPATIENT
Start: 2023-05-04 | End: 2023-05-04

## 2023-05-04 RX ADMIN — BETAMETHASONE SODIUM PHOSPHATE AND BETAMETHASONE ACETATE 6 MG: 3; 3 INJECTION, SUSPENSION INTRA-ARTICULAR; INTRALESIONAL; INTRAMUSCULAR; SOFT TISSUE at 13:34:00

## 2023-05-09 ENCOUNTER — OFFICE VISIT (OUTPATIENT)
Dept: ORTHOPEDICS CLINIC | Facility: CLINIC | Age: 63
End: 2023-05-09
Payer: COMMERCIAL

## 2023-05-09 ENCOUNTER — HOSPITAL ENCOUNTER (OUTPATIENT)
Dept: GENERAL RADIOLOGY | Age: 63
Discharge: HOME | End: 2023-05-09
Attending: PODIATRIST
Payer: COMMERCIAL

## 2023-05-09 VITALS — BODY MASS INDEX: 26.48 KG/M2 | HEIGHT: 64.63 IN | WEIGHT: 157 LBS

## 2023-05-09 DIAGNOSIS — R52 PAIN: ICD-10-CM

## 2023-05-09 DIAGNOSIS — M72.2 PLANTAR FASCIITIS: Primary | ICD-10-CM

## 2023-05-09 DIAGNOSIS — M85.679 BONE CYST OF FOOT: ICD-10-CM

## 2023-05-09 DIAGNOSIS — M85.871 OSTEOPENIA OF RIGHT FOOT: ICD-10-CM

## 2023-05-09 PROCEDURE — 99204 OFFICE O/P NEW MOD 45 MIN: CPT | Performed by: PODIATRIST

## 2023-05-09 PROCEDURE — 73630 X-RAY EXAM OF FOOT: CPT | Performed by: PODIATRIST

## 2023-05-09 PROCEDURE — 3008F BODY MASS INDEX DOCD: CPT | Performed by: PODIATRIST

## 2023-05-09 PROCEDURE — 20551 NJX 1 TENDON ORIGIN/INSJ: CPT | Performed by: PODIATRIST

## 2023-05-09 RX ORDER — BETAMETHASONE SODIUM PHOSPHATE AND BETAMETHASONE ACETATE 3; 3 MG/ML; MG/ML
4.2 INJECTION, SUSPENSION INTRA-ARTICULAR; INTRALESIONAL; INTRAMUSCULAR; SOFT TISSUE ONCE
Status: COMPLETED | OUTPATIENT
Start: 2023-05-09 | End: 2023-05-09

## 2023-05-09 RX ADMIN — BETAMETHASONE SODIUM PHOSPHATE AND BETAMETHASONE ACETATE 4.2 MG: 3; 3 INJECTION, SUSPENSION INTRA-ARTICULAR; INTRALESIONAL; INTRAMUSCULAR; SOFT TISSUE at 13:45:00

## 2023-05-10 RX ORDER — PREGABALIN 100 MG/1
CAPSULE ORAL
Qty: 90 CAPSULE | Refills: 0 | Status: SHIPPED | OUTPATIENT
Start: 2023-05-10

## 2023-05-10 NOTE — TELEPHONE ENCOUNTER
LOV: 1/20/23 (CPX)   Last Refill: 2/6/23, #90, 0 RF  Next OV: n/a    Please authorize if acceptable. Thank you!

## 2023-05-11 ENCOUNTER — TELEPHONE (OUTPATIENT)
Facility: LOCATION | Age: 63
End: 2023-05-11

## 2023-05-11 NOTE — TELEPHONE ENCOUNTER
Pt is calling to ask why her colonoscopy was coded as diagnostic and not routine. Apparently diagnostic is not covered by her insurance. She's calling to see what can be changed so it is covered by her insurance.      Please advise  Best callback number is [phone]

## 2023-07-24 DIAGNOSIS — G47.00 INSOMNIA, UNSPECIFIED TYPE: ICD-10-CM

## 2023-07-25 RX ORDER — TRAZODONE HYDROCHLORIDE 100 MG/1
100 TABLET ORAL NIGHTLY
Qty: 90 TABLET | Refills: 0 | Status: SHIPPED | OUTPATIENT
Start: 2023-07-25

## 2023-08-07 NOTE — TELEPHONE ENCOUNTER
LOV: 1/20/23 (CPX)  Last Refill: 5/10/23, #90, 0 RF  Next OV: n/a    Please contact pt to make an appointment, thank you!

## 2023-08-15 RX ORDER — PREGABALIN 100 MG/1
CAPSULE ORAL
Qty: 30 CAPSULE | Refills: 0 | Status: SHIPPED | OUTPATIENT
Start: 2023-08-15

## 2023-10-14 NOTE — TELEPHONE ENCOUNTER
Patient's surgery cancelled in [facility] Surgical case change request tab. Set up reminder to call patient to set up new date in 1 month in Epic reminder tab. Statement Selected

## 2023-10-27 ENCOUNTER — TELEMEDICINE (OUTPATIENT)
Dept: FAMILY MEDICINE CLINIC | Facility: CLINIC | Age: 63
End: 2023-10-27

## 2023-10-27 DIAGNOSIS — G89.29 CHRONIC NECK PAIN: ICD-10-CM

## 2023-10-27 DIAGNOSIS — G47.00 INSOMNIA, UNSPECIFIED TYPE: ICD-10-CM

## 2023-10-27 DIAGNOSIS — Z98.1 S/P CERVICAL SPINAL FUSION: Primary | ICD-10-CM

## 2023-10-27 DIAGNOSIS — M54.2 CHRONIC NECK PAIN: ICD-10-CM

## 2023-10-27 PROCEDURE — 99214 OFFICE O/P EST MOD 30 MIN: CPT | Performed by: FAMILY MEDICINE

## 2023-10-27 RX ORDER — PREGABALIN 100 MG/1
CAPSULE ORAL
Qty: 90 CAPSULE | Refills: 1 | Status: SHIPPED | OUTPATIENT
Start: 2023-10-27

## 2023-10-27 RX ORDER — TRAZODONE HYDROCHLORIDE 100 MG/1
100 TABLET ORAL NIGHTLY
Qty: 90 TABLET | Refills: 1 | Status: SHIPPED | OUTPATIENT
Start: 2023-10-27

## 2023-10-27 NOTE — PROGRESS NOTES
Aba Banuelos is a 70year old female. CHIEF COMPLAINT   Follow up on pain and insomnia  HPI:   This visit is conducted using Telemedicine with live, interactive video and audio. Patient understands and accepts financial responsibility for any deductible, co-insurance and/or co-pays associated with this service. HPI from last visit  \"Chronic pain in neck that causes headaches - gabapentin in the past that didn't work well - lots of hardware in her neck from prior neck surgery. Has seen neurosurgery and given exercises to do for neck to help with her headaches. \"    Lyrica helps with pain. Doesn't take it completely away but makes it manageable. Trazodone for sleep. \"Sleep is still kind of jacked\". Some nights will feel tired and takes her 2 hours to fall asleep even with the trazodone  Sometimes can't sleep through the night. Can't sleep without trazodone. Would like to continue same medication for now. Doesn't want to try any therapy currently. Current Outpatient Medications   Medication Sig Dispense Refill    Ibandronate Sodium 150 MG Oral Tab Take 1 tablet (150 mg total) by mouth every 30 (thirty) days. 3 tablet 0    pregabalin 100 MG Oral Cap TAKE 1 CAPSULE BY MOUTH ONCE DAILY IN THE EVENING 30 capsule 0    traZODone 100 MG Oral Tab Take 1 tablet (100 mg total) by mouth nightly.  90 tablet 0      Past Medical History:   Diagnosis Date    Calculus of kidney     Exposure to medical diagnostic radiation     1/2021    Osteoporosis     Personal history of antineoplastic chemotherapy     1/2021 completed    PONV (postoperative nausea and vomiting)     nausea    Rectal cancer (HCC)       Social History:  Social History     Socioeconomic History    Marital status:     Number of children: 1   Occupational History    Occupation: Works as    Tobacco Use    Smoking status: Never    Smokeless tobacco: Never   Vaping Use    Vaping Use: Never used   Substance and Sexual Activity    Alcohol use: Not Currently    Drug use: No    Sexual activity: Yes     Partners: Male     Birth control/protection: Hysterectomy   Other Topics Concern    Caffeine Concern Yes     Comment: 1-3 coffee per daily    Exercise Yes     Comment: occ- ON HOLD   Social History Narrative    , lives at home with     Has 1 daughter        REVIEW OF SYSTEMS:   GENERAL HEALTH: as above    EXAM:   There were no vitals taken for this visit. Normal BP at home this AM.  GENERAL: well developed, well nourished,in no apparent distress  SKIN: no rashes,no suspicious lesions  RESP:  speaking in full sentences. No respiratory difficulty. NEURO: no focal deficits. PSYCH: normal affect. Normal mood. ASSESSMENT AND PLAN:   Insomnia, unspecified type  S/p cervical spinal fusion  (primary encounter diagnosis)  Chronic neck pain      Meds & Refills for this Visit:  Requested Prescriptions     Signed Prescriptions Disp Refills    pregabalin 100 MG Oral Cap 90 capsule 1     Sig: TAKE 1 CAPSULE BY MOUTH ONCE DAILY IN THE EVENING    traZODone 100 MG Oral Tab 90 tablet 1     Sig: Take 1 tablet (100 mg total) by mouth nightly. The patient indicates understanding of these issues and agrees to the plan. The patient is asked to return for complete physical and prn.

## 2024-01-19 ENCOUNTER — PATIENT OUTREACH (OUTPATIENT)
Dept: FAMILY MEDICINE CLINIC | Facility: CLINIC | Age: 64
End: 2024-01-19

## 2024-03-11 ENCOUNTER — TELEPHONE (OUTPATIENT)
Dept: FAMILY MEDICINE CLINIC | Facility: CLINIC | Age: 64
End: 2024-03-11

## 2024-03-11 DIAGNOSIS — M81.0 OSTEOPOROSIS, UNSPECIFIED OSTEOPOROSIS TYPE, UNSPECIFIED PATHOLOGICAL FRACTURE PRESENCE: Primary | ICD-10-CM

## 2024-03-11 NOTE — TELEPHONE ENCOUNTER
Iris from central scheduling calling regarding pt's DEXA     Pt has scheduled 3/14  Asking for new order as the previous order will .    I reviewed the order and advised Iris that I see it does not  until 3/20/24    She sts they go off the order date and orders are only good x a yr from the order date (2023). Asking for new order.    I pended if you agree.    Please approve if appropriate. Thanks.

## 2024-03-14 ENCOUNTER — HOSPITAL ENCOUNTER (OUTPATIENT)
Dept: BONE DENSITY | Age: 64
Discharge: HOME | End: 2024-03-14
Attending: FAMILY MEDICINE
Payer: MEDICARE

## 2024-03-14 DIAGNOSIS — M81.0 OSTEOPOROSIS, UNSPECIFIED OSTEOPOROSIS TYPE, UNSPECIFIED PATHOLOGICAL FRACTURE PRESENCE: ICD-10-CM

## 2024-03-14 PROCEDURE — 77080 DXA BONE DENSITY AXIAL: CPT | Performed by: FAMILY MEDICINE

## 2024-03-19 ENCOUNTER — OFFICE VISIT (OUTPATIENT)
Dept: FAMILY MEDICINE CLINIC | Facility: CLINIC | Age: 64
End: 2024-03-19
Payer: MEDICARE

## 2024-03-19 VITALS
RESPIRATION RATE: 16 BRPM | HEIGHT: 64 IN | SYSTOLIC BLOOD PRESSURE: 110 MMHG | DIASTOLIC BLOOD PRESSURE: 68 MMHG | BODY MASS INDEX: 27.55 KG/M2 | TEMPERATURE: 97 F | WEIGHT: 161.38 LBS | HEART RATE: 92 BPM

## 2024-03-19 DIAGNOSIS — Z13.1 SCREENING FOR DIABETES MELLITUS: ICD-10-CM

## 2024-03-19 DIAGNOSIS — I34.0 MILD MITRAL REGURGITATION: ICD-10-CM

## 2024-03-19 DIAGNOSIS — Z12.31 ENCOUNTER FOR SCREENING MAMMOGRAM FOR MALIGNANT NEOPLASM OF BREAST: ICD-10-CM

## 2024-03-19 DIAGNOSIS — Z13.220 SCREENING FOR LIPID DISORDERS: ICD-10-CM

## 2024-03-19 DIAGNOSIS — G47.00 INSOMNIA, UNSPECIFIED TYPE: ICD-10-CM

## 2024-03-19 DIAGNOSIS — Z13.0 SCREENING FOR DEFICIENCY ANEMIA: ICD-10-CM

## 2024-03-19 DIAGNOSIS — Z00.00 ENCOUNTER FOR ANNUAL HEALTH EXAMINATION: Primary | ICD-10-CM

## 2024-03-19 DIAGNOSIS — M54.2 NECK PAIN: ICD-10-CM

## 2024-03-19 DIAGNOSIS — M81.0 OSTEOPOROSIS, UNSPECIFIED OSTEOPOROSIS TYPE, UNSPECIFIED PATHOLOGICAL FRACTURE PRESENCE: ICD-10-CM

## 2024-03-19 DIAGNOSIS — E55.9 VITAMIN D DEFICIENCY: ICD-10-CM

## 2024-03-19 DIAGNOSIS — Z85.048 HISTORY OF ANAL CANCER: ICD-10-CM

## 2024-03-19 RX ORDER — IBANDRONATE SODIUM 150 MG/1
150 TABLET, FILM COATED ORAL
Qty: 3 TABLET | Refills: 3 | Status: SHIPPED | OUTPATIENT
Start: 2024-03-19

## 2024-03-19 RX ORDER — PREGABALIN 100 MG/1
CAPSULE ORAL
Qty: 90 CAPSULE | Refills: 1 | Status: SHIPPED | OUTPATIENT
Start: 2024-03-19

## 2024-03-19 RX ORDER — TRAZODONE HYDROCHLORIDE 100 MG/1
150 TABLET ORAL NIGHTLY
Qty: 135 TABLET | Refills: 1 | Status: SHIPPED | OUTPATIENT
Start: 2024-03-19

## 2024-03-19 NOTE — PROGRESS NOTES
Subjective:   Hina Martin is a 63 year old female who presents for a Medicare Initial Preventative Physical Exam (Welcome to Medicare- < 12 months on Medicare) and     Also follow up on sleep, pain and osteoporosis.  Patient is aware that there is an office visit charge associated with this and isn't part of the free Welcome to Medicare. She was given the option to schedule this service on a different day - she elected to do both today.     Taking trazodone for sleep - able to fall asleep but still has trouble staying asleep.  Has been taking trazodone for \"a long time\".  Would like to try Ambien CR.    Some difficulty with remembering certain words.  Just concerned if it could be anything with her medication.  Lyrica for pain - working fairly well.  Needs refill. Chronic pain in neck that causes headaches - gabapentin in the past that didn't work well - lots of hardware in her neck from prior neck surgery. Has seen neurosurgery and given exercises to do for neck to help with her headaches.    Taking ibandronate for osteoporosis - here to review Dexa scan.  - reviewed Dexa scan - stable - will plan to continue ibandronate and repeat Dexa in 3 years.        History/Other:   Fall Risk Assessment:   She has been screened for Falls and is low risk.      Cognitive Assessment:   Abnormal  What day of the week is this?: Correct  What month is it?: Correct  What year is it?: Correct  Recall \"Ball\": Correct  Recall \"Flag\": Correct  Recall \"Tree\": Incorrect    Functional Ability/Status:   Hina Martin has a completely normal functional assessment. See flowsheet for details.        Depression Screening (PHQ-2/PHQ-9): PHQ-2 SCORE: 0  , done 3/19/2024        Advanced Directives:   She does have a Living Will but we do NOT have it on file in Epic.    She does NOT have a Power of  for Health Care. [Do you have a healthcare power of ?: No]  Discussed Advance Care Planning with patient (and family/surrogate if  present). Standard forms made available to patient in After Visit Summary.      Patient Active Problem List   Diagnosis    Vitamin D deficiency    Insomnia, unspecified    Osteoporosis    Cervical disc disorder at C5-C6 level with radiculopathy    Ulnar nerve entrapment at elbow    Bilateral carpal tunnel syndrome    Microscopic hematuria    S/P cervical spinal fusion    Adenosquamous carcinoma of anus (HCC)    Adenomatous polyp of colon    Nonrheumatic mitral valve regurgitation    Anal sphincter incontinence    Bone cyst of foot    History of anal cancer     Allergies:  She is allergic to dander, iodine (topical), radiology contrast iodinated dyes, and skin adhesives.    Current Medications:  Outpatient Medications Marked as Taking for the 3/19/24 encounter (Office Visit) with Dressler, Stephanie, PA-C   Medication Sig    Ibandronate Sodium 150 MG Oral Tab Take 1 tablet (150 mg total) by mouth every 30 (thirty) days.    traZODone 100 MG Oral Tab Take 1.5 tablets (150 mg total) by mouth nightly.    pregabalin 100 MG Oral Cap TAKE 1 CAPSULE BY MOUTH ONCE DAILY IN THE EVENING       Medical History:  She  has a past medical history of Anal cancer (HCC), Calculus of kidney, Exposure to medical diagnostic radiation, Lower extremity edema, Osteoporosis, Personal history of antineoplastic chemotherapy, Plantar fasciitis, PONV (postoperative nausea and vomiting), and Rectal cancer (HCC).  Surgical History:  She  has a past surgical history that includes colonoscopy,diagnostic (05/05/2015); colonoscopy,shantal miner,snare (N/A, 05/05/2015); colonoscopy,biopsy (N/A, 05/05/2015); other (06/04/2018); other (09/09/2019); colonoscopy (N/A, 09/24/2020); hysterectomy (1988); other surgical history; other surgical history (09/2019); other surgical history (06/2018); other surgical history (11/18/2020); port, indwelling, imp; colonoscopy (N/A, 12/09/2021); and oophorectomy.   Family History:  Her family history includes Breast Cancer in  her maternal aunt, maternal aunt, maternal aunt, maternal aunt, and paternal grandmother; Breast Cancer (age of onset: 48) in her mother; Cancer in her paternal grandmother; Cancer (age of onset: 48) in her paternal uncle; Diabetes in her father, paternal grandmother, paternal uncle, and paternal uncle; Heart Disorder in her maternal aunt, maternal aunt, maternal aunt, and paternal grandfather; Heart Disorder (age of onset: 30) in her paternal uncle; Heart Disorder (age of onset: 38) in her paternal uncle; Heart Disorder (age of onset: 43) in her father; Heart Disorder (age of onset: 45) in her paternal uncle; Heart Disorder (age of onset: 46) in her paternal uncle; Heart Disorder (age of onset: 60) in her paternal uncle; Ovarian Cancer (age of onset: 13) in her paternal cousin female; osteoporosis in her mother.  Social History:  She  reports that she has never smoked. She has never used smokeless tobacco. She reports that she does not currently use alcohol. She reports that she does not use drugs.    Tobacco:  She has never smoked tobacco.    CAGE Alcohol Screen:   CAGE screening score of 0 on 3/19/2024, showing low risk of alcohol abuse.      Patient Care Team:  Terri Johnson MD as PCP - General  Damian Alonzo MD as Consulting Physician (NEUROSURGERY)  Emanuel Leonard PA (Physician Assistant)  Zaynab Gomse PA-C (Physician Assistant)  Emerald Nicole APRN as Palliative Care Provider (Nurse Practitioner)  Lauren Mckoy MD (Radiation Oncology)  Jaquelin Nuñez MD (Radiation Oncology)  Alejandro Hassan RN as Registered Nurse (Registered Nurse)  Indira Zeng, RN as Registered Nurse (Registered Nurse)    Review of Systems     Negative except as above    Objective:   Physical Exam  General Appearance:  Alert, cooperative, no distress, appears stated age   Head:  Normocephalic, without obvious abnormality, atraumatic   Eyes:  conjunctiva/corneas clear, EOM's intact both eyes   Ears:  Normal TM's  and external ear canals, both ears   Nose: deferred   Throat: Lips, mucosa, and tongue normal; teeth and gums normal   Neck: Supple, symmetrical, trachea midline, no adenopathy;  thyroid: not enlarged, symmetric, no tenderness/mass/nodules; no carotid bruit or JVD   Back:   No deformity   Lungs:   Clear to auscultation bilaterally, respirations unlabored   Heart:  Regular rate and rhythm, S1 and S2 normal, no murmur, rub, or gallop   Abdomen:   Soft, non-tender, no masses, no organomegaly   Pelvic: Deferred per patient request   Extremities: Extremities normal, atraumatic, no cyanosis or edema   Pulses: 2+ and symmetric   Skin: Skin color, texture, turgor normal, no rashes or lesions   Lymph nodes: Cervical, supraclavicular, and axillary nodes normal   Neurologic: Normal   BREASTS:  no skin changes. No masses. No lymph nodes. No nipple discharge.    /68   Pulse 92   Temp 97 °F (36.1 °C) (Temporal)   Resp 16   Ht 5' 4\" (1.626 m)   Wt 161 lb 6.4 oz (73.2 kg)   BMI 27.70 kg/m²  Estimated body mass index is 27.7 kg/m² as calculated from the following:    Height as of this encounter: 5' 4\" (1.626 m).    Weight as of this encounter: 161 lb 6.4 oz (73.2 kg).    Medicare Hearing Assessment:   Hearing Screening    Time taken: 3/19/2024 12:07 PM  Screening Method: Finger Rub  Finger Rub Result: Pass         Visual Acuity:   Right Eye Visual Acuity: Corrected Right Eye Chart Acuity: 20/30   Left Eye Visual Acuity: Corrected Left Eye Chart Acuity: 20/30   Both Eyes Visual Acuity: Corrected Both Eyes Chart Acuity: 20/30   Able To Tolerate Visual Acuity: Yes        Assessment & Plan:   Hina Martin is a 63 year old female who presents for a Medicare Assessment.     1. Encounter for annual health examination (Primary)  2. Screening for lipid disorders  -     Comp Metabolic Panel (14); Future; Expected date: 03/19/2024  -     Lipid Panel; Future; Expected date: 03/19/2024  3. Vitamin D deficiency  -     Vitamin D;  Future; Expected date: 03/19/2024  4. Screening for deficiency anemia  -     CBC With Differential With Platelet; Future; Expected date: 03/19/2024  5. Encounter for screening mammogram for malignant neoplasm of breast  -     [facility] TIP 2D+3D SCREENING BILAT (CPT=77067/63334); Future; Expected date: 03/19/2024  6. Insomnia, unspecified type  -     traZODone HCl; Take 1.5 tablets (150 mg total) by mouth nightly.  Dispense: 135 tablet; Refill: 1  7. Screening for diabetes mellitus  -     Comp Metabolic Panel (14); Future; Expected date: 03/19/2024  8. Neck pain  -     Pregabalin; TAKE 1 CAPSULE BY MOUTH ONCE DAILY IN THE EVENING  Dispense: 90 capsule; Refill: 1  9. Mild mitral regurgitation  -     CARD ECHO 2D DOPPLER (CPT=93306); Future; Expected date: 03/19/2024  10. Osteoporosis, unspecified osteoporosis type, unspecified pathological fracture presence  11. History of anal cancer (unable to remove active problem from problem list as is linked to a current treatment plan)  Other orders  -     Ibandronate Sodium; Take 1 tablet (150 mg total) by mouth every 30 (thirty) days.  Dispense: 3 tablet; Refill: 3    The patient indicates understanding of these issues and agrees to the plan.  Reinforced healthy diet, lifestyle, and exercise.      No follow-ups on file.     Stephanie Dressler, PA-C, 3/19/2024     Supplementary Documentation:   General Health:  In the past six months, have you lost more than 10 pounds without trying?: 2 - No  Has your appetite been poor?: No  Type of Diet: Balanced  How does the patient maintain a good energy level?: Daily Walks  How would you describe your daily physical activity?: Moderate  How would you describe your current health state?: Good  How do you maintain positive mental well-being?: Social Interaction;Visiting Family;Visiting Friends ([employer] volunteer)  On a scale of 0 to 10, with 0 being no pain and 10 being severe pain, what is your pain level?: 6 - (Moderate)  In the past six  months, have you experienced urine leakage?: 0-No (sometimes leakage when laughing)  At any time do you feel concerned for the safety/well-being of yourself and/or your children, in your home or elsewhere?: No  Have you had any immunizations at another office such as Influenza, Hepatitis B, Tetanus, or Pneumococcal?: No         Hina Martin's SCREENING SCHEDULE   Tests on this list are recommended by your physician but may not be covered, or covered at this frequency, by your insurer.   Please check with your insurance carrier before scheduling to verify coverage.   PREVENTATIVE SERVICES FREQUENCY &  COVERAGE DETAILS LAST COMPLETION DATE   Diabetes Screening    Fasting Blood Sugar /  Glucose    One screening every 12 months if never tested or if previously tested but not diagnosed with pre-diabetes   One screening every 6 months if diagnosed with pre-diabetes Lab Results   Component Value Date     (H) 01/27/2023        Cardiovascular Disease Screening    Lipid Panel  Cholesterol  Lipoprotein (HDL)  Triglycerides Covered every 5 years for all Medicare beneficiaries without apparent signs or symptoms of cardiovascular disease Lab Results   Component Value Date    CHOLEST 211 (H) 01/27/2023    HDL 72 (H) 01/27/2023     (H) 01/27/2023    TRIG 76 01/27/2023         Electrocardiogram (EKG)   Covered if needed at Welcome to Medicare, and non-screening if indicated for medical reasons 03/18/2020      Ultrasound Screening for Abdominal Aortic Aneurysm (AAA) Covered once in a lifetime for one of the following risk factors    Men who are 65-75 years old and have ever smoked    Anyone with a family history -     Colorectal Cancer Screening  Covered for ages 50-85; only need ONE of the following:    Colonoscopy   Covered every 10 years    Covered every 2 years if patient is at high risk or previous colonoscopy was abnormal 12/16/2022    Health Maintenance   Topic Date Due    Colorectal Cancer Screening   12/16/2025       Flexible Sigmoidoscopy   Covered every 4 years -    Fecal Occult Blood Test Covered annually -   Bone Density Screening    Bone density screening    Covered every 2 years after age 65 if diagnosed with risk of osteoporosis or estrogen deficiency.    Covered yearly for long-term glucocorticoid medication use (Steroids) Last Dexa Scan:    XR DEXA BONE DENSITOMETRY (CPT=77080) 03/14/2024      No recommendations at this time   Pap and Pelvic    Pap   Covered every 2 years for women at normal risk; Annually if at high risk 11/09/2020  No recommendations at this time    Chlamydia Annually if high risk -  No recommendations at this time   Screening Mammogram    Mammogram     Recommend annually for all female patients aged 40 and older    One baseline mammogram covered for patients aged 35-39 12/29/2022    Health Maintenance   Topic Date Due    Mammogram  12/29/2023       Immunizations    Influenza Covered once per flu season  Please get every year -  Influenza Vaccine(1) due on 10/01/2023    Pneumococcal Each vaccine ([id number] & [id number]) covered once after 65 Prevnar 13: -    [id number]: -     Pneumococcal Vaccination(1 of 2 - PCV) Never done    Hepatitis B One screening covered for patients with certain risk factors   -  No recommendations at this time    Tetanus Toxoid Not covered by Medicare Part B unless medically necessary (cut with metal); may be covered with your pharmacy prescription benefits -    Tetanus, Diptheria and Pertusis TD and TDaP Not covered by Medicare Part B -  No recommendations at this time    Zoster Not covered by Medicare Part B; may be covered with your pharmacy  prescription benefits -  Zoster Vaccines(1 of 2) Never done     Annual Monitoring of Persistent Medications (ACE/ARB, digoxin diuretics, anticonvulsants)    Potassium Annually Lab Results   Component Value Date    K 5.4 (H) 01/27/2023         Creatinine   Annually Lab Results   Component Value Date    CREATSERUM  0.82 01/27/2023         BUN Annually Lab Results   Component Value Date    BUN 23 (H) 01/27/2023       Drug Serum Conc Annually No results found for: \"DIGOXIN\", \"DIG\", \"VALP\"

## 2024-03-19 NOTE — PATIENT INSTRUCTIONS
Hina Martin's SCREENING SCHEDULE   Tests on this list are recommended by your physician but may not be covered, or covered at this frequency, by your insurer.   Please check with your insurance carrier before scheduling to verify coverage.   PREVENTATIVE SERVICES FREQUENCY &  COVERAGE DETAILS LAST COMPLETION DATE   Diabetes Screening    Fasting Blood Sugar /  Glucose    One screening every 12 months if never tested or if previously tested but not diagnosed with pre-diabetes   One screening every 6 months if diagnosed with pre-diabetes Lab Results   Component Value Date     (H) 01/27/2023        Cardiovascular Disease Screening    Lipid Panel  Cholesterol  Lipoprotein (HDL)  Triglycerides Covered every 5 years for all Medicare beneficiaries without apparent signs or symptoms of cardiovascular disease Lab Results   Component Value Date    CHOLEST 211 (H) 01/27/2023    HDL 72 (H) 01/27/2023     (H) 01/27/2023    TRIG 76 01/27/2023         Electrocardiogram (EKG)   Covered if needed at Welcome to Medicare, and non-screening if indicated for medical reasons 03/18/2020      Ultrasound Screening for Abdominal Aortic Aneurysm (AAA) Covered once in a lifetime for one of the following risk factors    Men who are 65-75 years old and have ever smoked    Anyone with a family history -     Colorectal Cancer Screening  Covered for ages 50-85; only need ONE of the following:    Colonoscopy   Covered every 10 years    Covered every 2 years if patient is at high risk or previous colonoscopy was abnormal 12/16/2022    Health Maintenance   Topic Date Due    Colorectal Cancer Screening  12/16/2025       Flexible Sigmoidoscopy   Covered every 4 years -    Fecal Occult Blood Test Covered annually -   Bone Density Screening    Bone density screening    Covered every 2 years after age 65 if diagnosed with risk of osteoporosis or estrogen deficiency.    Covered yearly for long-term glucocorticoid medication use (Steroids)  Last Dexa Scan:    XR DEXA BONE DENSITOMETRY (CPT=77080) 03/14/2024      No recommendations at this time   Pap and Pelvic    Pap   Covered every 2 years for women at normal risk; Annually if at high risk 11/09/2020  No recommendations at this time    Chlamydia Annually if high risk -  No recommendations at this time   Screening Mammogram    Mammogram     Recommend annually for all female patients aged 40 and older    One baseline mammogram covered for patients aged 35-39 12/29/2022    Health Maintenance   Topic Date Due    Mammogram  12/29/2023       Immunizations    Influenza Covered once per flu season  Please get every year -  Influenza Vaccine(1) due on 10/01/2023    Pneumococcal Each vaccine ([id number] & [id number]) covered once after 65 Prevnar 13: -    [id number]: -     Pneumococcal Vaccination(1 of 2 - PCV) Never done    Hepatitis B One screening covered for patients with certain risk factors   -  No recommendations at this time    Tetanus Toxoid Not covered by Medicare Part B unless medically necessary (cut with metal); may be covered with your pharmacy prescription benefits -    Tetanus, Diptheria and Pertusis TD and TDaP Not covered by Medicare Part B -  No recommendations at this time    Zoster Not covered by Medicare Part B; may be covered with your pharmacy  prescription benefits -  Zoster Vaccines(1 of 2) Never done     Annual Monitoring of Persistent Medications (ACE/ARB, digoxin diuretics, anticonvulsants)    Potassium Annually Lab Results   Component Value Date    K 5.4 (H) 01/27/2023         Creatinine   Annually Lab Results   Component Value Date    CREATSERUM 0.82 01/27/2023         BUN Annually Lab Results   Component Value Date    BUN 23 (H) 01/27/2023       Drug Serum Conc Annually No results found for: \"DIGOXIN\", \"DIG\", \"VALP\"         Recommended Websites for Advanced Directives     http://www.isms.org/publications/Documents/personal_dec.pdf  An information packet, including necessary form  from the [place] [employer] website.      http://www.idph.state.[place]/public/books/advin.htm  A link to the [place] Dept of Public Health. This site has a lot of good information including definitions of the different types of Advance Directives. It also has the State forms available on it's website for anyone to review and print using their home computer and printer. (the forms are also available in Slovenian)  www.putitinwriting.org  This link also has information from the [employer] regarding Advance Directives.    Talk to the pharmacist about a Shingrix vaccine.    Talk to the pharmacist about Prevnar 20 (pneumonia).      Repeat bone density scan in 3 years    Follow up 6 months for medication visit.

## 2024-03-20 PROBLEM — Z85.048 HISTORY OF ANAL CANCER: Status: ACTIVE | Noted: 2024-03-20

## 2024-03-20 PROBLEM — R60.0 LOWER EXTREMITY EDEMA: Status: RESOLVED | Noted: 2023-02-17 | Resolved: 2024-03-20

## 2024-03-20 PROBLEM — M72.2 PLANTAR FASCIITIS: Status: RESOLVED | Noted: 2023-05-09 | Resolved: 2024-03-20

## 2024-03-20 PROBLEM — C21.0 ANAL CANCER (HCC): Status: RESOLVED | Noted: 2022-11-04 | Resolved: 2024-03-20

## 2024-03-20 PROBLEM — R13.10 DYSPHAGIA, UNSPECIFIED TYPE: Status: RESOLVED | Noted: 2019-08-30 | Resolved: 2024-03-20

## 2024-03-21 ENCOUNTER — PATIENT MESSAGE (OUTPATIENT)
Dept: FAMILY MEDICINE CLINIC | Facility: CLINIC | Age: 64
End: 2024-03-21

## 2024-03-22 DIAGNOSIS — C21.0 ANAL CANCER (HCC): Primary | ICD-10-CM

## 2024-03-22 RX ORDER — DIPHENHYDRAMINE HCL 25 MG
50 CAPSULE ORAL AS DIRECTED
Qty: 2 CAPSULE | Refills: 0 | Status: SHIPPED | OUTPATIENT
Start: 2024-03-22

## 2024-03-22 RX ORDER — ERGOCALCIFEROL 1.25 MG/1
50000 CAPSULE ORAL WEEKLY
Qty: 8 CAPSULE | Refills: 0 | OUTPATIENT
Start: 2024-03-22

## 2024-03-22 RX ORDER — PREDNISONE 50 MG/1
50 TABLET ORAL AS DIRECTED
Qty: 3 TABLET | Refills: 0 | Status: SHIPPED | OUTPATIENT
Start: 2024-03-22

## 2024-03-22 NOTE — TELEPHONE ENCOUNTER
I accept one new patient per month in the new patient spot that is available.  Will need to call for this since those appointments aren't accessible online.

## 2024-03-22 NOTE — TELEPHONE ENCOUNTER
From: Hina Martin  To: Stephanie Dressler  Sent: 3/21/2024 5:53 PM CDT  Subject: Still need the refill fir the Vitamin D    Hi Dr. Galdamez, I picked up my other refills but [facility] dud not have the Vitamin D prescription order. Can you please send one over to them. Thank you. Hina Martin

## 2024-03-22 NOTE — TELEPHONE ENCOUNTER
She was on prescription vitamin D last year for low vitamin D.  Here is the result note from last year: \"Informed her that Vitamin D is low.  Take 50,000 international units once weekly for 8 weeks then decrease to 1,000 international units daily. Repeat vitamin d in 4 months. Rx and lab order placed.\"  She is due for a vitamin D level - ordered at visit.

## 2024-03-22 NOTE — TELEPHONE ENCOUNTER
From: Hina Martin  To: Stephanie Dressler  Sent: 3/21/2024 6:00 PM CDT  Subject: Forgot to ask you...    Are you accepting new patients? I always talk so highly about you and wanted to know if you are taking new patients. My  needs a primary care doctor and so does my best girlfriend. Please let me know. Thanks Hina Martin

## 2024-04-03 DIAGNOSIS — C21.0 ANAL CANCER (HCC): Primary | ICD-10-CM

## 2024-04-04 ENCOUNTER — HOSPITAL ENCOUNTER (OUTPATIENT)
Dept: MAMMOGRAPHY | Age: 64
Discharge: HOME | End: 2024-04-04
Attending: FAMILY MEDICINE
Payer: MEDICARE

## 2024-04-04 DIAGNOSIS — Z12.31 ENCOUNTER FOR SCREENING MAMMOGRAM FOR MALIGNANT NEOPLASM OF BREAST: ICD-10-CM

## 2024-04-04 PROCEDURE — 77067 SCR MAMMO BI INCL CAD: CPT | Performed by: FAMILY MEDICINE

## 2024-04-04 PROCEDURE — 77063 BREAST TOMOSYNTHESIS BI: CPT | Performed by: FAMILY MEDICINE

## 2024-04-04 NOTE — IMAGING NOTE
Spoke to patient and confirmed iodinated contrast allergy.   Patient already has prescription for the 13 hr prep and already knows the protocol as she has taken the prep multiple times in the past. Pt verbalized understanding.

## 2024-04-05 ENCOUNTER — OFFICE VISIT (OUTPATIENT)
Dept: SURGERY | Facility: CLINIC | Age: 64
End: 2024-04-05
Payer: MEDICARE

## 2024-04-05 ENCOUNTER — TELEPHONE (OUTPATIENT)
Dept: SURGERY | Facility: CLINIC | Age: 64
End: 2024-04-05

## 2024-04-05 VITALS — DIASTOLIC BLOOD PRESSURE: 74 MMHG | SYSTOLIC BLOOD PRESSURE: 116 MMHG | HEART RATE: 82 BPM

## 2024-04-05 DIAGNOSIS — R20.2 PARESTHESIA OF RIGHT ARM: ICD-10-CM

## 2024-04-05 DIAGNOSIS — Z98.1 S/P CERVICAL SPINAL FUSION: Primary | ICD-10-CM

## 2024-04-05 DIAGNOSIS — M54.12 CERVICAL RADICULOPATHY: Primary | ICD-10-CM

## 2024-04-05 DIAGNOSIS — M54.12 CERVICAL RADICULOPATHY: ICD-10-CM

## 2024-04-05 PROCEDURE — 99214 OFFICE O/P EST MOD 30 MIN: CPT | Performed by: NURSE PRACTITIONER

## 2024-04-05 RX ORDER — TRAMADOL HYDROCHLORIDE 50 MG/1
50 TABLET ORAL EVERY 6 HOURS PRN
Qty: 60 TABLET | Refills: 0 | OUTPATIENT
Start: 2024-04-05 | End: 2024-04-05

## 2024-04-05 RX ORDER — TRAMADOL HYDROCHLORIDE 50 MG/1
50 TABLET ORAL EVERY 6 HOURS PRN
Qty: 60 TABLET | Refills: 0 | Status: SHIPPED | OUTPATIENT
Start: 2024-04-05

## 2024-04-05 RX ORDER — TRAMADOL HYDROCHLORIDE 50 MG/1
TABLET ORAL EVERY 6 HOURS PRN
Qty: 60 TABLET | Refills: 0 | Status: SHIPPED | OUTPATIENT
Start: 2024-04-05 | End: 2024-04-05

## 2024-04-05 NOTE — TELEPHONE ENCOUNTER
Pharmacy calling , states script for traMADol 50 MG Oral Tab, is too high of a dose as they follow CC guidelines. Transferred call to nursing.

## 2024-04-05 NOTE — TELEPHONE ENCOUNTER
MME is 80, should be 40MME    Max of 4 tablets/day is recommended.     Discussed with Kelly Logan, she advised that directions can be changed to 1 tab Q 6 hours as needed for pain. [facility] pharmacist notified of change.     Pended RX with new directions as phone in, please sign off on script.

## 2024-04-05 NOTE — PATIENT INSTRUCTIONS
Refill policies:    Allow 2-3 business days for refills; controlled substances may take longer.  Contact your pharmacy at least 5 days prior to running out of medication and have them send an electronic request or submit request through the “request refill” option in your [employer] account.  Refills are not addressed on weekends; covering physicians do not authorize routine medications on weekends.  No narcotics or controlled substances are refilled after noon on Fridays or by on call physicians.  By law, narcotics must be electronically prescribed.  A 30 day supply with no refills is the maximum allowed.  If your prescription is due for a refill, you may be due for a follow up appointment.  To best provide you care, patients receiving routine medications need to be seen at least once a year.  Patients receiving narcotic/controlled substance medications need to be seen at least once every 3 months.  In the event that your preferred pharmacy does not have the requested medication in stock (e.g. Backordered), it is your responsibility to find another pharmacy that has the requested medication available.  We will gladly send a new prescription to that pharmacy at your request.    Scheduling Tests:    If your physician has ordered radiology tests such as MRI or CT scans, please contact Central Scheduling at [phone] right away to schedule the test.  Once scheduled, the [facility] Centralized Referral Team will work with your insurance carrier to obtain pre-certification or prior authorization.  Depending on your insurance carrier, approval may take 3-10 days.  It is highly recommended patients assure they have received an authorization before having a test performed.  If test is done without insurance authorization, patient may be responsible for the entire amount billed.      Precertification and Prior Authorizations:  If your physician has recommended that you have a procedure or additional testing performed the [facility]  Centralized Referral Team will contact your insurance carrier to obtain pre-certification or prior authorization.    You are strongly encouraged to contact your insurance carrier to verify that your procedure/test has been approved and is a COVERED benefit.  Although the [facility] Centralized Referral Team does its due diligence, the insurance carrier gives the disclaimer that \"Although the procedure is authorized, this does not guarantee payment.\"    Ultimately the patient is responsible for payment.   Thank you for your understanding in this matter.  Paperwork Completion:  If you require FMLA or disability paperwork for your recovery, please make sure to either drop it off or have it faxed to our office at [phone]. Be sure the form has your name and date of birth on it.  The form will be faxed to our Forms Department and they will complete it for you.  There is a 25$ fee for all forms that need to be filled out.  Please be aware there is a 10-14 day turnaround time.  You will need to sign a release of information (MOISES) form if your paperwork does not come with one.  You may call the Forms Department with any questions at [phone].  Their fax number is [phone].

## 2024-04-05 NOTE — PROGRESS NOTES
[facility]   Outpatient Neurological Surgery Follow Up    Hina Martin  : 1960  PCP: Terri Johnson MD  Referring Provider: No ref. provider found    REASON FOR VISIT:  Chief Complaint   Patient presents with    Pain     Right elbow and hand/wrist pain         HISTORY OF PRESENT ILLNESS:  Hina Martin is a 63 year old female who presents today.  Pt is s/p ACDF and posterior cervical fusion by Dr. Alonzo in  and .  Pt states she had a right carpal tunnel and ulnar nerve decompression.  Patient states she was doing well until about 6 weeks ago.  Patient has developed shooting pain into her right wrist and thumb as well as her right outer elbow.  She also chronically has bilateral shoulder pain.  Patient has developed trigger finger in her bilateral fourth fingers.  She is seeing Ortho for this.  Patient also reports pain to the inner aspect of her bilateral hands.  It occasionally right-sided does go into her flank.  Patient states that she wakes up with this pain.  She has a history of lumbar fractures.  She denies current lumbar pain.  Patient reports hearing a popping noise in her neck about a month ag.  She states her symptoms concerned about the same time.  Patient denies new weakness but chronically feels weaker in her right hand.  She denies difficulty with ambulation or changes in bowel or bladder function.  She is taking over-the-counter Tylenol and Lyrica without relief.  Patient has not had recent imaging of her spine      PAST MEDICAL HISTORY:  Past Medical History:   Diagnosis Date    Anal cancer (HCC)     Calculus of kidney     Exposure to medical diagnostic radiation     2021    Lower extremity edema     Osteoporosis     Personal history of antineoplastic chemotherapy     2021 completed    Plantar fasciitis     PONV (postoperative nausea and vomiting)     nausea    Rectal cancer (HCC)        PAST SURGICAL HISTORY:  Past Surgical History:    Procedure Laterality Date    COLONOSCOPY N/A 09/24/2020    Procedure: COLONOSCOPY;  Surgeon: Gareth Eli MD;  Location:  ENDOSCOPY    COLONOSCOPY N/A 12/09/2021    Procedure: COLONOSCOPY WITH BIOPSY;  Surgeon: Gareth Eli MD;  Location:  ENDOSCOPY    COLONOSCOPY,BIOPSY N/A 05/05/2015    Procedure: COLONOSCOPY, POSSIBLE BIOPSY, POSSIBLE POLYPECTOMY 65315;  Surgeon: Jeronimo Borja MD;  Location: [facility]    COLONOSCOPY,DIAGNOSTIC  05/05/2015    2 splenic flexure polyps, diverticulosis    COLONOSCOPY,REMV LESN,SNARE N/A 05/05/2015    Procedure: COLONOSCOPY, POSSIBLE BIOPSY, POSSIBLE POLYPECTOMY 67140;  Surgeon: Jeronimo Borja MD;  Location: [facility]    HYSTERECTOMY  1988    1 ovary removed.    OOPHORECTOMY  1988    one ovary removed.    OTHER  06/04/2018    CERVICAL 4 - CERVICAL 5, CERVICAL 5 - CERVICAL 6  ANTERIOR CERVICAL DISCECTOMY AND FUSION WITH INSTRUMENTATION, ALLOGRAFT AND  ALL INDICATED PROCEDURES    OTHER  09/09/2019    RIGHT CARPAL TUNNEL RELEASE     OTHER SURGICAL HISTORY      breast implants    OTHER SURGICAL HISTORY  09/2019    Ulnar transposition, carpal tunnel release right arm    OTHER SURGICAL HISTORY  06/2018    C4-6 fusion    OTHER SURGICAL HISTORY  11/18/2020    Port-A-Cath Placement with fluroscopy, left chest     PORT, INDWELLING, IMP      removed s/p chemo       SOCIAL HISTORY:   reports that she has never smoked. She has never used smokeless tobacco. She reports that she does not currently use alcohol. She reports that she does not use drugs.      ALLERGIES:  Allergies   Allergen Reactions    Dander ANAPHYLAXIS     Dogs and cats     Iodine (Topical) HIVES and TONGUE SWELLING     Fish iodine     Radiology Contrast Iodinated Dyes ANAPHYLAXIS and HIVES    Skin Adhesives RASH     Skin glue, dermabond        MEDICATIONS:  Current Outpatient Medications   Medication Sig Dispense Refill    diphenhydrAMINE (BENADRYL ALLERGY) 25 MG  Oral Cap Take 2 capsules (50 mg total) by mouth As Directed for 1 dose. Please take 1 hour prior to scheduled scan. 2 capsule 0    predniSONE 50 MG Oral Tab Take 1 tablet (50 mg total) by mouth As Directed for 3 doses. 1st dose 13 hrs prior to scheduled scan. 2nd dose 7 hours prior to scan. 3rd dose 1 hour prior to scan. 3 tablet 0    Ibandronate Sodium 150 MG Oral Tab Take 1 tablet (150 mg total) by mouth every 30 (thirty) days. 3 tablet 3    traZODone 100 MG Oral Tab Take 1.5 tablets (150 mg total) by mouth nightly. 135 tablet 1    pregabalin 100 MG Oral Cap TAKE 1 CAPSULE BY MOUTH ONCE DAILY IN THE EVENING 90 capsule 1       REVIEW OF SYSTEMS:  Pertinent positives and negatives are noted in HPI.      PHYSICAL EXAMINATION:  VITAL SIGNS: /74   Pulse 82   GENERAL:  Patient is a 63 year old female in no apparent distress.  HEENT:  Normocephalic, atraumatic.  NEUROLOGICAL:  This patient is alert and orientated x 3.  Speech fluent. Able to follow simple commands.  CN II - XII intact.    Upper extremity strength:      Deltoid  Biceps  Triceps   W.flexion  W.extension    Finger abduction     Right 5 5 5 5  5 5 5     Left 5 5 5 5 5 5 5     Lower extremity strength:      Iliospoas  Hamstrings  Quads  D-flexion  P-flexion EHL     Right 5 5 5 5 5 5     Left 5 5 5 5 5 5     Negative Carpal or ulnar tinnels  Negative Finkelstein    IMAGING:  No recent imaging to review    ASSESSMENT:    ICD-10-CM    1. S/P cervical spinal fusion  Z98.1 MRI SPINE CERVICAL (CPT=72141) [[id number]]     XR CERVICAL SPINE (2-3 VIEWS) (CPT=72040)     traMADol 50 MG Oral Tab      2. Paresthesia of right arm  R20.2 MRI SPINE CERVICAL (CPT=72141) [[id number]]     XR CERVICAL SPINE (2-3 VIEWS) (CPT=72040)      3. Cervical radiculopathy  M54.12 MRI SPINE CERVICAL (CPT=72141) [[id number]]     XR CERVICAL SPINE (2-3 VIEWS) (CPT=72040)     traMADol 50 MG Oral Tab            PLAN:  Mri cervical to evaluate for cervical causes of pain.  Discussed that  patient symptoms could be caused by De quervain's   2.   Cervical XR to evaluate hardware  3.   Pt has CT with contrast scheduled next week.  Patient has a contrast allergy so has high-dose steroid already ordered.  Will not start steroid burst at this time  4.   Continue Lyrica as previously ordered  5.   Short course of tramadol sent to pharmacy  6.   Will review CT abdomen and pelvis to r/o lumbar causes of leg pain  7.    Pt may benefit from EMG if imaging is negative  8.    F/u after imaging         Total time spent:  30 minutes  Greater than 50% of the time was spent on counseling/coordination of care.  Nature of education / counseling: Pathology, treatment options, and expected outcomes    MERNA Anne  4/5/2024, 1:04 PM

## 2024-04-11 ENCOUNTER — HOSPITAL ENCOUNTER (OUTPATIENT)
Dept: GENERAL RADIOLOGY | Facility: HOSPITAL | Age: 64
Discharge: HOME | End: 2024-04-11
Attending: NURSE PRACTITIONER
Payer: MEDICARE

## 2024-04-11 ENCOUNTER — HOSPITAL ENCOUNTER (OUTPATIENT)
Dept: CT IMAGING | Facility: HOSPITAL | Age: 64
Discharge: HOME | End: 2024-04-11
Attending: INTERNAL MEDICINE
Payer: MEDICARE

## 2024-04-11 DIAGNOSIS — M54.12 CERVICAL RADICULOPATHY: ICD-10-CM

## 2024-04-11 DIAGNOSIS — R20.2 PARESTHESIA OF RIGHT ARM: ICD-10-CM

## 2024-04-11 DIAGNOSIS — C21.0 ANAL CANCER (HCC): ICD-10-CM

## 2024-04-11 DIAGNOSIS — Z98.1 S/P CERVICAL SPINAL FUSION: ICD-10-CM

## 2024-04-11 PROCEDURE — 74177 CT ABD & PELVIS W/CONTRAST: CPT | Performed by: INTERNAL MEDICINE

## 2024-04-11 PROCEDURE — 72040 X-RAY EXAM NECK SPINE 2-3 VW: CPT | Performed by: NURSE PRACTITIONER

## 2024-04-11 PROCEDURE — 71260 CT THORAX DX C+: CPT | Performed by: INTERNAL MEDICINE

## 2024-04-17 NOTE — PROGRESS NOTES
[facility]    [facility] Note    Diagnosis: cT2 N0 MX Anal SCC    Treatment History:  1. ChemoRT with 5-FU 4000 mg/m2 D1-4 and D29-32 + MMC 10 mg/m2 on D1 and D29: 11/30/20 to 01/08/21    Visit Diagnosis:  1. Anal cancer (HCC)        History of Present Illness: 63F with a PMH of Osteoporosis referred by Dr. Eli for anal cancer. Today is follow up for cT2 N0 Mx Anal SCCa. She had C1D29 of 5-FU + MMC on 12/28/20. She completed RT on 1/8/21. Her follow up imaging from 05/2021 shows OPAL.    Hematology/Oncology History:   She presented to see Dr. Eli on 9/14/20 due to hematochezia. This has been going on for 6 months associated with anal pain. Stool consistent has changed. Due to pain, she has a difficult time sitting. No prolapse. Her work up is below. Colonoscopy in 2015 with polyps. Pap was normal in 2018. Paternal grandfather with colon cancer. Mother with breast cancer. No smoking or etoh abuse. She is having issues with both neck and anal pain. She has not taken her norco or flexaril in fear of being addicted.     9/24/20: Colonoscopy with Dr. Eli: mass originating at dentate line--moderately differentiated non-keratinizing squamous cells, positive for CK4, p40, p16 and negative for CK20 and CDX2. Small population with CK7 positive cell. Overall--squamous cell carcinoma of anus, p16 positive. Possible small component of adenosquamous.     10/2/20: MRI Rectal 3T: pedunculated intra-luminal mass, measures 4.4 x 3.3 x 1.9 cm; along the right side there is a complex cyst (2.2 x 2.2 x 1.9 cm). Clinical T3c N0.      10/5/20: Discussed at TB--plan for PET/CT followed by excision of mass to further evaluate. Will need to evaluate cystic fluid. CEA 11.6.    10/9/20: PET/CT: Abnormal uptake in rectal/anal mass with SUV 15.9.     10/14/20: Anal canal mass excision and Right Lateral rectal sub-mucosal lesion: poorly differentiated carcinoma with squamous differentiation.     11/9/20: Normal Pap and negative  Oglesby catheter.  Continues to have rogelio hematuria, suspect traumatic Oglesby HPV.    11/30/20: Started ChemoRT with 5-FU + MMC  D1: 11/30/20  --Required G-CSF due to  on 12/15/20--  D29: 12/28/20  RT done on 1/8/21 05/06/21: CT Chest/Abdomen and MR Rectal protocol: There is no evidence of diease. There is slight thickening of the lateral rectal mucosa related to previous surgery. Case was reviewed at Rect[facility] and there is OPAL but she needs to follow up with Dr. Eli.     Anal EUS on 12/9/21: No evidence of disease     CT Chest/Abd + MRI Rectal from 09/2022: OPAL    CT CAP 4/11/24: LLL pleural based nodule appears calcified, unclear if there previously. Otherwise OPAL.     Interval History:  -CT CAP reviewed   -Doing well. Still has some difficulty controlling bowels  -No hematochezia  -Due to see surgery  -Had RSV during yoly and symptoms lasted a while     Review of Systems: 12 Point ROS was completed and pertinent positives are in the HPI    Current Outpatient Medications on File Prior to Visit   Medication Sig Dispense Refill    traMADol 50 MG Oral Tab Take 1 tablet (50 mg total) by mouth every 6 (six) hours as needed for Pain. 60 tablet 0    Ibandronate Sodium 150 MG Oral Tab Take 1 tablet (150 mg total) by mouth every 30 (thirty) days. 3 tablet 3    traZODone 100 MG Oral Tab Take 1.5 tablets (150 mg total) by mouth nightly. 135 tablet 1    pregabalin 100 MG Oral Cap TAKE 1 CAPSULE BY MOUTH ONCE DAILY IN THE EVENING 90 capsule 1    diphenhydrAMINE (BENADRYL ALLERGY) 25 MG Oral Cap Take 2 capsules (50 mg total) by mouth As Directed for 1 dose. Please take 1 hour prior to scheduled scan. (Patient not taking: Reported on 4/18/2024) 2 capsule 0    predniSONE 50 MG Oral Tab Take 1 tablet (50 mg total) by mouth As Directed for 3 doses. 1st dose 13 hrs prior to scheduled scan. 2nd dose 7 hours prior to scan. 3rd dose 1 hour prior to scan. (Patient not taking: Reported on 4/18/2024) 3 tablet 0     Current Facility-Administered Medications on File Prior to Visit   Medication Dose  Route Frequency Provider Last Rate Last Admin    [COMPLETED] iopamidol 76% (ISOVUE-370) injection for power injector  100 mL Intravenous ONCE PRN Chaparrita Lindquist MD   100 mL at 04/11/24 1337     Past Medical History:    Anal cancer (HCC)    Calculus of kidney    Exposure to medical diagnostic radiation    1/2021    Lower extremity edema    Osteoporosis    Personal history of antineoplastic chemotherapy    1/2021 completed    Plantar fasciitis    PONV (postoperative nausea and vomiting)    nausea    Rectal cancer (HCC)     Past Surgical History:   Procedure Laterality Date    Colonoscopy N/A 09/24/2020    Procedure: COLONOSCOPY;  Surgeon: Gareth Eli MD;  Location:  ENDOSCOPY    Colonoscopy N/A 12/09/2021    Procedure: COLONOSCOPY WITH BIOPSY;  Surgeon: Gareth Eli MD;  Location:  ENDOSCOPY    Colonoscopy,biopsy N/A 05/05/2015    Procedure: COLONOSCOPY, POSSIBLE BIOPSY, POSSIBLE POLYPECTOMY 54129;  Surgeon: Jeronimo Borja MD;  Location: [facility]    Colonoscopy,diagnostic  05/05/2015    2 splenic flexure polyps, diverticulosis    Colonoscopy,remv lesn,snare N/A 05/05/2015    Procedure: COLONOSCOPY, POSSIBLE BIOPSY, POSSIBLE POLYPECTOMY 86582;  Surgeon: Jeronimo Borja MD;  Location: [facility]    Hysterectomy  1988    1 ovary removed.    Oophorectomy  1988    one ovary removed.    Other  06/04/2018    CERVICAL 4 - CERVICAL 5, CERVICAL 5 - CERVICAL 6  ANTERIOR CERVICAL DISCECTOMY AND FUSION WITH INSTRUMENTATION, ALLOGRAFT AND  ALL INDICATED PROCEDURES    Other  09/09/2019    RIGHT CARPAL TUNNEL RELEASE     Other surgical history      breast implants    Other surgical history  09/2019    Ulnar transposition, carpal tunnel release right arm    Other surgical history  06/2018    C4-6 fusion    Other surgical history  11/18/2020    Port-A-Cath Placement with fluroscopy, left chest     Port, indwelling, imp      removed s/p chemo     Social History  Socioeconomic History    Marital status:     Number of children: 1   Occupational History    Occupation: Works as    Tobacco Use    Smoking status: Never    Smokeless tobacco: Never   Vaping Use    Vaping status: Never Used   Substance and Sexual Activity    Alcohol use: Not Currently    Drug use: No    Sexual activity: Yes     Partners: Male     Birth control/protection: Hysterectomy      Family History   Problem Relation Age of Onset    Breast Cancer Mother 48    Other (osteoporosis) Mother     Heart Disorder Father 43        CABG x 5, had stents later in life    Diabetes Father     Cancer Paternal Grandmother         breast cancer, colon cancer    Diabetes Paternal Grandmother     Breast Cancer Paternal Grandmother 50        50's?    Heart Disorder Paternal Grandfather          from AMI    Heart Disorder Maternal Aunt     Breast Cancer Maternal Aunt 70        70's?    Heart Disorder Maternal Aunt     Breast Cancer Maternal Aunt 70        70's?    Heart Disorder Maternal Aunt     Breast Cancer Maternal Aunt 60        60's    Breast Cancer Maternal Aunt 80        80?    Heart Disorder Paternal Uncle 30         from AMI    Heart Disorder Paternal Uncle 45         from AMI    Cancer Paternal Uncle 48         from lung cancer    Heart Disorder Paternal Uncle 38        CABG    Heart Disorder Paternal Uncle 46         from CAD    Diabetes Paternal Uncle     Heart Disorder Paternal Uncle 60         from CAD    Diabetes Paternal Uncle     Ovarian Cancer Paternal Cousin Female 13        passed away age 14       Physical Exam  Height: 162.6 cm (5' 4.02\") ( 130)  Weight: 72.1 kg (159 lb) ( 130)  BSA (Calculated - sq m): 1.77 sq meters ( 130)  Pulse: 79 ( 130)  BP: 124/71 ( 130)  Temp: 97.9 °F (36.6 °C) ( 130)  Do Not Use - Resp Rate: --  SpO2: 96 % ( 130)     General: NAD, AOX3  HEENT:  no jvd, no scleral icterus  LN: No inguinal  "Jose Alves MD  Hospitalist Service  LifeCare LAD  CV: RRR S1S2  Lungs: CTAB, no increased WOB  Extremities: No edema   Abd: soft nt nd  Lungs:  no increased work of breathing CTAB  Neuro: CN: II-XII grossly intact    Results:reviewed   Lab Results   Component Value Date    WBC 3.3 (L) 04/18/2024    HGB 13.4 04/18/2024    HCT 40.8 04/18/2024    MCV 93.4 04/18/2024    .0 04/18/2024    EOSABS 0.00 12/21/2020     Lab Results   Component Value Date     04/18/2024    K 4.7 04/18/2024    CO2 27.0 04/18/2024     04/18/2024    BUN 14 04/18/2024    ALB 3.6 04/18/2024       Radiology: I personally reviewed the imaging    Pathology: reviewed     Assessment:   Anal Squamous Cell Ca: cT2 N0 Mx, p16 positive  -S/P Anal canal mass and Right sub-mucosal mass excision with Dr. Eli on 10/14/20  -Gynecology exam was normal  -ChemoRT with 5-FU/MMC on 11/30/20 to 1/8/21  -CEA normalized (11.6 to 0.8) on 12/8/20  -Discussed She likely has a favorable OS with 5 year OS > 80% and <20 Locoregional failure.   -She is s/p 3 years of imaging. NO further imaging recommended. No evidence of recurrence   -Continue to follow up with surgery-Dr. Lara information given today   -q6 month visit     LLL Nodule: 3 mm and calcified. Possibly related to RSV. Will repeat a CT chest without contrast in 3 months    - Follow up: 6 months     [facility]

## 2024-04-18 ENCOUNTER — OFFICE VISIT (OUTPATIENT)
Dept: HEMATOLOGY/ONCOLOGY | Facility: HOSPITAL | Age: 64
End: 2024-04-18
Attending: INTERNAL MEDICINE
Payer: MEDICARE

## 2024-04-18 ENCOUNTER — LAB ENCOUNTER (OUTPATIENT)
Dept: LAB | Age: 64
End: 2024-04-18
Attending: FAMILY MEDICINE
Payer: MEDICARE

## 2024-04-18 VITALS
TEMPERATURE: 98 F | WEIGHT: 159 LBS | DIASTOLIC BLOOD PRESSURE: 71 MMHG | RESPIRATION RATE: 16 BRPM | HEART RATE: 79 BPM | SYSTOLIC BLOOD PRESSURE: 124 MMHG | OXYGEN SATURATION: 96 % | BODY MASS INDEX: 27.14 KG/M2 | HEIGHT: 64.02 IN

## 2024-04-18 DIAGNOSIS — Z13.220 SCREENING FOR LIPID DISORDERS: ICD-10-CM

## 2024-04-18 DIAGNOSIS — C21.0 ANAL CANCER (HCC): ICD-10-CM

## 2024-04-18 DIAGNOSIS — D72.810 LYMPHOCYTES DECREASED: Primary | ICD-10-CM

## 2024-04-18 DIAGNOSIS — Z13.1 SCREENING FOR DIABETES MELLITUS: ICD-10-CM

## 2024-04-18 DIAGNOSIS — Z13.0 SCREENING FOR DEFICIENCY ANEMIA: ICD-10-CM

## 2024-04-18 DIAGNOSIS — R73.09 ELEVATED GLUCOSE: Primary | ICD-10-CM

## 2024-04-18 DIAGNOSIS — C21.0 ANAL CANCER (HCC): Primary | ICD-10-CM

## 2024-04-18 DIAGNOSIS — R73.09 ELEVATED GLUCOSE: ICD-10-CM

## 2024-04-18 DIAGNOSIS — E55.9 VITAMIN D DEFICIENCY: ICD-10-CM

## 2024-04-18 LAB
ALBUMIN SERPL-MCNC: 3.6 G/DL (ref 3.4–5)
ALBUMIN/GLOB SERPL: 1.2 {RATIO} (ref 1–2)
ALP LIVER SERPL-CCNC: 74 U/L
ALT SERPL-CCNC: 50 U/L
ANION GAP SERPL CALC-SCNC: 3 MMOL/L (ref 0–18)
AST SERPL-CCNC: 31 U/L (ref 15–37)
BASOPHILS # BLD AUTO: 0.02 X10(3) UL (ref 0–0.2)
BASOPHILS NFR BLD AUTO: 0.6 %
BILIRUB SERPL-MCNC: 0.4 MG/DL (ref 0.1–2)
BUN BLD-MCNC: 14 MG/DL (ref 9–23)
CALCIUM BLD-MCNC: 8.7 MG/DL (ref 8.5–10.1)
CEA SERPL-MCNC: 0.5 NG/ML (ref ?–5)
CHLORIDE SERPL-SCNC: 108 MMOL/L (ref 98–112)
CHOLEST SERPL-MCNC: 171 MG/DL (ref ?–200)
CO2 SERPL-SCNC: 27 MMOL/L (ref 21–32)
CREAT BLD-MCNC: 1 MG/DL
EGFRCR SERPLBLD CKD-EPI 2021: 63 ML/MIN/1.73M2 (ref 60–?)
EOSINOPHIL # BLD AUTO: 0.18 X10(3) UL (ref 0–0.7)
EOSINOPHIL NFR BLD AUTO: 5.5 %
ERYTHROCYTE [DISTWIDTH] IN BLOOD BY AUTOMATED COUNT: 13 %
EST. AVERAGE GLUCOSE BLD GHB EST-MCNC: 111 MG/DL (ref 68–126)
FASTING PATIENT LIPID ANSWER: YES
FASTING STATUS PATIENT QL REPORTED: YES
GLOBULIN PLAS-MCNC: 2.9 G/DL (ref 2.8–4.4)
GLUCOSE BLD-MCNC: 103 MG/DL (ref 70–99)
HBA1C MFR BLD: 5.5 % (ref ?–5.7)
HCT VFR BLD AUTO: 40.8 %
HDLC SERPL-MCNC: 55 MG/DL (ref 40–59)
HGB BLD-MCNC: 13.4 G/DL
IMM GRANULOCYTES # BLD AUTO: 0.01 X10(3) UL (ref 0–1)
IMM GRANULOCYTES NFR BLD: 0.3 %
LDLC SERPL CALC-MCNC: 99 MG/DL (ref ?–100)
LYMPHOCYTES # BLD AUTO: 0.54 X10(3) UL (ref 1–4)
LYMPHOCYTES NFR BLD AUTO: 16.4 %
MCH RBC QN AUTO: 30.7 PG (ref 26–34)
MCHC RBC AUTO-ENTMCNC: 32.8 G/DL (ref 31–37)
MCV RBC AUTO: 93.4 FL
MONOCYTES # BLD AUTO: 0.22 X10(3) UL (ref 0.1–1)
MONOCYTES NFR BLD AUTO: 6.7 %
NEUTROPHILS # BLD AUTO: 2.33 X10 (3) UL (ref 1.5–7.7)
NEUTROPHILS # BLD AUTO: 2.33 X10(3) UL (ref 1.5–7.7)
NEUTROPHILS NFR BLD AUTO: 70.5 %
NONHDLC SERPL-MCNC: 116 MG/DL (ref ?–130)
OSMOLALITY SERPL CALC.SUM OF ELEC: 287 MOSM/KG (ref 275–295)
PLATELET # BLD AUTO: 210 10(3)UL (ref 150–450)
POTASSIUM SERPL-SCNC: 4.7 MMOL/L (ref 3.5–5.1)
PROT SERPL-MCNC: 6.5 G/DL (ref 6.4–8.2)
RBC # BLD AUTO: 4.37 X10(6)UL
SODIUM SERPL-SCNC: 138 MMOL/L (ref 136–145)
TRIGL SERPL-MCNC: 91 MG/DL (ref 30–149)
VIT D+METAB SERPL-MCNC: 32.2 NG/ML (ref 30–100)
VLDLC SERPL CALC-MCNC: 15 MG/DL (ref 0–30)
WBC # BLD AUTO: 3.3 X10(3) UL (ref 4–11)

## 2024-04-18 PROCEDURE — 83036 HEMOGLOBIN GLYCOSYLATED A1C: CPT

## 2024-04-18 PROCEDURE — 85025 COMPLETE CBC W/AUTO DIFF WBC: CPT

## 2024-04-18 PROCEDURE — 82306 VITAMIN D 25 HYDROXY: CPT

## 2024-04-18 PROCEDURE — 80061 LIPID PANEL: CPT

## 2024-04-18 PROCEDURE — 99214 OFFICE O/P EST MOD 30 MIN: CPT | Performed by: INTERNAL MEDICINE

## 2024-04-18 PROCEDURE — 36415 COLL VENOUS BLD VENIPUNCTURE: CPT

## 2024-04-18 PROCEDURE — 80053 COMPREHEN METABOLIC PANEL: CPT

## 2024-04-18 PROCEDURE — 82378 CARCINOEMBRYONIC ANTIGEN: CPT

## 2024-04-18 NOTE — PROGRESS NOTES
Patient here for follow-up. States energy levels are stable. Denies any new or worsening bowel issues. Had recent CT 4/11/24.

## 2024-04-25 ENCOUNTER — HOSPITAL ENCOUNTER (OUTPATIENT)
Dept: MRI IMAGING | Age: 64
Discharge: HOME | End: 2024-04-25
Attending: NURSE PRACTITIONER
Payer: MEDICARE

## 2024-04-25 DIAGNOSIS — M54.12 CERVICAL RADICULOPATHY: ICD-10-CM

## 2024-04-25 DIAGNOSIS — R20.2 PARESTHESIA OF RIGHT ARM: ICD-10-CM

## 2024-04-25 DIAGNOSIS — Z98.1 S/P CERVICAL SPINAL FUSION: ICD-10-CM

## 2024-04-25 PROCEDURE — 72141 MRI NECK SPINE W/O DYE: CPT | Performed by: NURSE PRACTITIONER

## 2024-04-26 DIAGNOSIS — M54.12 CERVICAL RADICULOPATHY: ICD-10-CM

## 2024-04-26 RX ORDER — TRAMADOL HYDROCHLORIDE 50 MG/1
50 TABLET ORAL EVERY 6 HOURS PRN
Qty: 28 TABLET | Refills: 0 | Status: SHIPPED | OUTPATIENT
Start: 2024-04-26

## 2024-04-26 NOTE — TELEPHONE ENCOUNTER
Medication:   traMADol 50 MG Oral Tab 60 tablet 0 4/5/2024 --   Sig:   Take 1 tablet (50 mg total) by mouth every 6 (six) hours as needed for Pain.          Date of last refill: 4.5.24 (#60/0)  [place]      Last office visit: 4.26.24  Due back to clinic per last office note:    Date next office visit scheduled:  5.9.24  Future Appointments   Date Time Provider Department Center   5/9/2024  1:30 PM Kelly Logan APN [facility]           Last OV note recommendation:

## 2024-05-09 ENCOUNTER — TELEPHONE (OUTPATIENT)
Dept: SURGERY | Facility: CLINIC | Age: 64
End: 2024-05-09

## 2024-05-09 ENCOUNTER — OFFICE VISIT (OUTPATIENT)
Dept: SURGERY | Facility: CLINIC | Age: 64
End: 2024-05-09
Payer: MEDICARE

## 2024-05-09 VITALS
WEIGHT: 155 LBS | HEIGHT: 64 IN | SYSTOLIC BLOOD PRESSURE: 118 MMHG | DIASTOLIC BLOOD PRESSURE: 66 MMHG | BODY MASS INDEX: 26.46 KG/M2 | HEART RATE: 79 BPM

## 2024-05-09 DIAGNOSIS — Z98.1 ADJACENT SEGMENT DISEASE OF CERVICAL SPINE AT C6-C7 LEVEL WITH HISTORY OF FUSION PROCEDURE: ICD-10-CM

## 2024-05-09 DIAGNOSIS — M54.12 CERVICAL RADICULOPATHY: Primary | ICD-10-CM

## 2024-05-09 DIAGNOSIS — Z98.1 S/P CERVICAL SPINAL FUSION: ICD-10-CM

## 2024-05-09 DIAGNOSIS — M50.323 ADJACENT SEGMENT DISEASE OF CERVICAL SPINE AT C6-C7 LEVEL WITH HISTORY OF FUSION PROCEDURE: ICD-10-CM

## 2024-05-09 PROCEDURE — 99213 OFFICE O/P EST LOW 20 MIN: CPT | Performed by: NURSE PRACTITIONER

## 2024-05-09 RX ORDER — TRAMADOL HYDROCHLORIDE 50 MG/1
TABLET ORAL EVERY 6 HOURS PRN
Qty: 40 TABLET | Refills: 0 | Status: SHIPPED | OUTPATIENT
Start: 2024-05-09

## 2024-05-09 NOTE — PROGRESS NOTES
Established patient:  Reason for follow up:   Discuss imaging results     Numeric Rating Scale:        Pain at Present: 6/10       New imaging or testing since your last office visit:    MRI spine cervical DOS 04/25/24   XR cervical spine DOS 04/11/24

## 2024-05-09 NOTE — PROGRESS NOTES
[facility]   Outpatient Neurological Surgery Follow Up    Hina Martin  : 1960  PCP: Terri Johnson MD  Referring Provider: No ref. provider found    REASON FOR VISIT:  Chief Complaint   Patient presents with    Follow - Up       HISTORY OF PRESENT ILLNESS:  Hina Martin is a 63 year old female who presents today for follow up.  Pt states since her LOV, she has developed pain into her left 3rd and 4th fingers in addition to her right sided complaints.  Pt has completed her imaging and is here for review.       Previous HPI   Hina Martin is a 63 year old female who presents today.  Pt is s/p ACDF and posterior cervical fusion by Dr. Alonzo in  and .  Pt states she had a right carpal tunnel and ulnar nerve decompression.  Patient states she was doing well until about 6 weeks ago.  Patient has developed shooting pain into her right wrist and thumb as well as her right outer elbow.  She also chronically has bilateral shoulder pain.  Patient has developed trigger finger in her bilateral fourth fingers.  She is seeing Ortho for this.  Patient also reports pain to the inner aspect of her bilateral hands.  It occasionally right-sided does go into her flank.  Patient states that she wakes up with this pain.  She has a history of lumbar fractures.  She denies current lumbar pain.  Patient reports hearing a popping noise in her neck about a month ag.  She states her symptoms concerned about the same time.  Patient denies new weakness but chronically feels weaker in her right hand.  She denies difficulty with ambulation or changes in bowel or bladder function.  She is taking over-the-counter Tylenol and Lyrica without relief.  Patient has not had recent imaging of her spine     PAST MEDICAL HISTORY:  Past Medical History:    Anal cancer (HCC)    Calculus of kidney    Exposure to medical diagnostic radiation    2021    Lower extremity edema    Osteoporosis     Personal history of antineoplastic chemotherapy    1/2021 completed    Plantar fasciitis    PONV (postoperative nausea and vomiting)    nausea    Rectal cancer (HCC)       PAST SURGICAL HISTORY:  Past Surgical History:   Procedure Laterality Date    Colonoscopy N/A 09/24/2020    Procedure: COLONOSCOPY;  Surgeon: Gareth Eli MD;  Location:  ENDOSCOPY    Colonoscopy N/A 12/09/2021    Procedure: COLONOSCOPY WITH BIOPSY;  Surgeon: Gareth Eli MD;  Location:  ENDOSCOPY    Colonoscopy,biopsy N/A 05/05/2015    Procedure: COLONOSCOPY, POSSIBLE BIOPSY, POSSIBLE POLYPECTOMY 71641;  Surgeon: Jeronimo Borja MD;  Location: [facility]    Colonoscopy,diagnostic  05/05/2015    2 splenic flexure polyps, diverticulosis    Colonoscopy,remv lesn,snare N/A 05/05/2015    Procedure: COLONOSCOPY, POSSIBLE BIOPSY, POSSIBLE POLYPECTOMY 00070;  Surgeon: Jeronimo Borja MD;  Location: [facility]    Hysterectomy  1988    1 ovary removed.    Oophorectomy  1988    one ovary removed.    Other  06/04/2018    CERVICAL 4 - CERVICAL 5, CERVICAL 5 - CERVICAL 6  ANTERIOR CERVICAL DISCECTOMY AND FUSION WITH INSTRUMENTATION, ALLOGRAFT AND  ALL INDICATED PROCEDURES    Other  09/09/2019    RIGHT CARPAL TUNNEL RELEASE     Other surgical history      breast implants    Other surgical history  09/2019    Ulnar transposition, carpal tunnel release right arm    Other surgical history  06/2018    C4-6 fusion    Other surgical history  11/18/2020    Port-A-Cath Placement with fluroscopy, left chest     Port, indwelling, imp      removed s/p chemo       SOCIAL HISTORY:   reports that she has never smoked. She has never used smokeless tobacco. She reports that she does not currently use alcohol. She reports that she does not use drugs.      ALLERGIES:  Allergies   Allergen Reactions    Dander ANAPHYLAXIS     Dogs and cats     Iodine (Topical) HIVES and TONGUE SWELLING     Fish iodine     Radiology  Contrast Iodinated Dyes ANAPHYLAXIS and HIVES    Skin Adhesives RASH     Skin glue, dermabond        MEDICATIONS:  Current Outpatient Medications   Medication Sig Dispense Refill    traMADol 50 MG Oral Tab Take 1-2 tablets ( mg total) by mouth every 6 (six) hours as needed for Pain. 40 tablet 0    Ibandronate Sodium 150 MG Oral Tab Take 1 tablet (150 mg total) by mouth every 30 (thirty) days. 3 tablet 3    traZODone 100 MG Oral Tab Take 1.5 tablets (150 mg total) by mouth nightly. 135 tablet 1    pregabalin 100 MG Oral Cap TAKE 1 CAPSULE BY MOUTH ONCE DAILY IN THE EVENING 90 capsule 1       REVIEW OF SYSTEMS:  Pertinent positives and negatives are noted in HPI.      PHYSICAL EXAMINATION:  VITAL SIGNS: /66   Pulse 79   Ht 64\"   Wt 155 lb (70.3 kg)   BMI 26.61 kg/m²   GENERAL:  Patient is a 63 year old female in no apparent distress.  HEENT:  Normocephalic, atraumatic.  NEUROLOGICAL:  This patient is alert and orientated x 3.  Speech fluent. Able to follow simple commands.  CN II - XII intact.  PAUL x 4.  Gait steady with cane    IMAGING:  MRI cervical reviewed, shows previous anterior and posterior cervical fusions from C4-6, bilateral foraminal stenosis with disc bulge at C6-7       ASSESSMENT:    ICD-10-CM    1. Cervical radiculopathy  M54.12 Physiatry Referral - [facility]     traMADol 50 MG Oral Tab      2. S/P cervical spinal fusion  Z98.1 Physiatry Referral - [facility]      3. Adjacent segment disease of cervical spine at C6-C7 level with history of fusion procedure  M50.323 Physiatry Referral - [facility]    Z98.1           PLAN:  Reviewed xrays and MRI with patient  2.   Discussed EMG for work up of arm complaints vs injections for the possible C7 radiculopathy.  Pt would like to try injections first and if no response then proceed with EMG   3.   Pt referred to physiatry for possible injections and EMG  4.   F/u after physiatry       Total time spent:  20 minutes  Greater than 50% of the time  was spent on counseling/coordination of care.  Nature of education / counseling: Pathology, treatment options, and expected outcomes    MERNA Anne  5/9/2024, 2:29 PM

## 2024-05-09 NOTE — PATIENT INSTRUCTIONS
Refill policies:    Allow 2-3 business days for refills; controlled substances may take longer.  Contact your pharmacy at least 5 days prior to running out of medication and have them send an electronic request or submit request through the “request refill” option in your [employer] account.  Refills are not addressed on weekends; covering physicians do not authorize routine medications on weekends.  No narcotics or controlled substances are refilled after noon on Fridays or by on call physicians.  By law, narcotics must be electronically prescribed.  A 30 day supply with no refills is the maximum allowed.  If your prescription is due for a refill, you may be due for a follow up appointment.  To best provide you care, patients receiving routine medications need to be seen at least once a year.  Patients receiving narcotic/controlled substance medications need to be seen at least once every 3 months.  In the event that your preferred pharmacy does not have the requested medication in stock (e.g. Backordered), it is your responsibility to find another pharmacy that has the requested medication available.  We will gladly send a new prescription to that pharmacy at your request.    Scheduling Tests:    If your physician has ordered radiology tests such as MRI or CT scans, please contact Central Scheduling at [phone] right away to schedule the test.  Once scheduled, the [facility] Centralized Referral Team will work with your insurance carrier to obtain pre-certification or prior authorization.  Depending on your insurance carrier, approval may take 3-10 days.  It is highly recommended patients assure they have received an authorization before having a test performed.  If test is done without insurance authorization, patient may be responsible for the entire amount billed.      Precertification and Prior Authorizations:  If your physician has recommended that you have a procedure or additional testing performed the [facility]  Centralized Referral Team will contact your insurance carrier to obtain pre-certification or prior authorization.    You are strongly encouraged to contact your insurance carrier to verify that your procedure/test has been approved and is a COVERED benefit.  Although the [facility] Centralized Referral Team does its due diligence, the insurance carrier gives the disclaimer that \"Although the procedure is authorized, this does not guarantee payment.\"    Ultimately the patient is responsible for payment.   Thank you for your understanding in this matter.  Paperwork Completion:  If you require FMLA or disability paperwork for your recovery, please make sure to either drop it off or have it faxed to our office at [phone]. Be sure the form has your name and date of birth on it.  The form will be faxed to our Forms Department and they will complete it for you.  There is a 25$ fee for all forms that need to be filled out.  Please be aware there is a 10-14 day turnaround time.  You will need to sign a release of information (MOISES) form if your paperwork does not come with one.  You may call the Forms Department with any questions at [phone].  Their fax number is [phone].

## 2024-05-13 ENCOUNTER — OFFICE VISIT (OUTPATIENT)
Dept: PHYSICAL MEDICINE AND REHAB | Facility: CLINIC | Age: 64
End: 2024-05-13

## 2024-05-13 ENCOUNTER — MED REC SCAN ONLY (OUTPATIENT)
Dept: PHYSICAL MEDICINE AND REHAB | Facility: CLINIC | Age: 64
End: 2024-05-13

## 2024-05-13 DIAGNOSIS — G56.03 BILATERAL CARPAL TUNNEL SYNDROME: ICD-10-CM

## 2024-05-13 DIAGNOSIS — M79.601 RIGHT ARM PAIN: Primary | ICD-10-CM

## 2024-05-13 DIAGNOSIS — M81.0 AGE-RELATED OSTEOPOROSIS WITHOUT CURRENT PATHOLOGICAL FRACTURE: ICD-10-CM

## 2024-05-13 DIAGNOSIS — Z98.1 S/P CERVICAL SPINAL FUSION: ICD-10-CM

## 2024-05-13 PROCEDURE — 99204 OFFICE O/P NEW MOD 45 MIN: CPT | Performed by: PHYSICAL MEDICINE & REHABILITATION

## 2024-05-13 RX ORDER — MELOXICAM 15 MG/1
15 TABLET ORAL DAILY
Qty: 30 TABLET | Refills: 0 | Status: SHIPPED | OUTPATIENT
Start: 2024-05-13

## 2024-05-13 NOTE — PROGRESS NOTES
[facility]  Progress Note    CHIEF COMPLAINT:    Chief Complaint   Patient presents with    Neck Pain     NEW RIGHT handed pt for cerv pain. Ref by Dr. Lee's PA. MRI cerv sp 4/25/24, XR cerv sp 4/11/24, Bone Scan 3/14/24. Overall pain 7/10 No pain to neck but radiating pain to R wrist and BUE w/ sharp. Denies N/T, admits weakness to R hand. Pt has complex medical hx of osteoporosis, cancer, and several surgeries-has plate anterior & rods posterior c4,c5,c6.  MRI shows issues to C7. Patient had fall 5 wks ago which also has produced lumar sp pain-pt needs to use cane at this time since the fall       History of Present Illness:  Hina Martin is a 63 year old female who is being seen in consultation at the request of MERNA Anne.  She has a history of 2 cervical spine operations in 2018 an ACDF and in 2020 a cervical posterior fixation, apparently because of screw dislodgment due to osteoporosis.  The surgeries were for symptoms of neck and right arm pain.  After those surgeries she had persistent right arm pain then underwent right carpal tunnel and ulnar decompression by Dr. Lee.  After that she was 95% better.  In the interim she has been diagnosed with anal cancer is now in remission.  In mid March she experienced a recurrence of right arm pain with radiation into the middle and ring fingers bilaterally.  Because of this she underwent recent cervical MRI scanning. She tells me she should not be taking steroids because of osteoporosis.  An EMG has been ordered, but is not yet scheduled.    She tells me she is on pregabalin because one of her physicians thinks she might have a lot of soft tissue pain. She has chronic bilateral shoulder pain, chronic right wrist and bilateral thumb pain.  She states she has trigger fingers but no physician has diagnosed her with that. Additionally, she has been experiencing lumbar pain, using a cane.           PAST  MEDICAL HISTORY:  Past Medical History:    Anal cancer (HCC)    Calculus of kidney    Exposure to medical diagnostic radiation    1/2021    Lower extremity edema    Osteoporosis    Personal history of antineoplastic chemotherapy    1/2021 completed    Plantar fasciitis    PONV (postoperative nausea and vomiting)    nausea    Rectal cancer (HCC)       SURGICAL HISTORY:  Past Surgical History:   Procedure Laterality Date    Colonoscopy N/A 09/24/2020    Procedure: COLONOSCOPY;  Surgeon: Gareth Eli MD;  Location:  ENDOSCOPY    Colonoscopy N/A 12/09/2021    Procedure: COLONOSCOPY WITH BIOPSY;  Surgeon: Gareth Eli MD;  Location:  ENDOSCOPY    Colonoscopy,biopsy N/A 05/05/2015    Procedure: COLONOSCOPY, POSSIBLE BIOPSY, POSSIBLE POLYPECTOMY 80212;  Surgeon: Jeronimo Borja MD;  Location: [facility]    Colonoscopy,diagnostic  05/05/2015    2 splenic flexure polyps, diverticulosis    Colonoscopy,remv lesn,snare N/A 05/05/2015    Procedure: COLONOSCOPY, POSSIBLE BIOPSY, POSSIBLE POLYPECTOMY 82946;  Surgeon: Jeronimo Borja MD;  Location: [facility]    Hysterectomy  1988    1 ovary removed.    Oophorectomy  1988    one ovary removed.    Other  06/04/2018    CERVICAL 4 - CERVICAL 5, CERVICAL 5 - CERVICAL 6  ANTERIOR CERVICAL DISCECTOMY AND FUSION WITH INSTRUMENTATION, ALLOGRAFT AND  ALL INDICATED PROCEDURES    Other  09/09/2019    RIGHT CARPAL TUNNEL RELEASE     Other surgical history      breast implants    Other surgical history  09/2019    Ulnar transposition, carpal tunnel release right arm    Other surgical history  06/2018    C4-6 fusion    Other surgical history  11/18/2020    Port-A-Cath Placement with fluroscopy, left chest     Port, indwelling, imp      removed s/p chemo       SOCIAL HISTORY:   Social History     Occupational History    Occupation: Works as    Tobacco Use    Smoking status: Never    Smokeless tobacco: Never   Vaping Use     Vaping status: Never Used   Substance and Sexual Activity    Alcohol use: Not Currently    Drug use: No    Sexual activity: Yes     Partners: Male     Birth control/protection: Hysterectomy       CURRENT MEDICATIONS:   Current Outpatient Medications   Medication Sig Dispense Refill    Meloxicam 15 MG Oral Tab Take 1 tablet (15 mg total) by mouth daily. 30 tablet 0    traMADol 50 MG Oral Tab Take 1-2 tablets ( mg total) by mouth every 6 (six) hours as needed for Pain. 40 tablet 0    Ibandronate Sodium 150 MG Oral Tab Take 1 tablet (150 mg total) by mouth every 30 (thirty) days. 3 tablet 3    traZODone 100 MG Oral Tab Take 1.5 tablets (150 mg total) by mouth nightly. 135 tablet 1    pregabalin 100 MG Oral Cap TAKE 1 CAPSULE BY MOUTH ONCE DAILY IN THE EVENING 90 capsule 1       ALLERGIES:   Allergies   Allergen Reactions    Dander ANAPHYLAXIS     Dogs and cats     Iodine (Topical) HIVES and TONGUE SWELLING     Fish iodine     Radiology Contrast Iodinated Dyes ANAPHYLAXIS and HIVES    Skin Adhesives RASH     Skin glue, dermabond        REVIEW OF SYSTEMS:   Patient-reported ROS  Constitutional  Sleep Disturbance: admits (RUE pain & LBP)  Chills: denies  Fever: denies  Weight Gain: denies  Weight Loss: denies   Cardiovascular  Chest Pain: denies  Irregular Heartbeat: denies   Respiratory  Painful Breathing: denies  Wheezing: denies   Gastrointestinal  Heartburn: admits  Abdominal Pain: admits  Blood in Stool : admits  Rectal Pain: admits (Hx of rectal ca.)   Hematology  Easy Bruising: denies  Easy Bleeding: denies   Genitourinary  Difficulty Urinating: denies  Bladder Incontinence: admits (urgency)  Pelvic Pain: denies  Painful Urination: denies   Musculoskeletal  Joint Stiffness: denies  Painful Joints: admits  Tailbone Pain: denies  Swollen Joints: denies   Peripheral Vascular  Swelling of Legs/Feet: admits  Cold Extremities: denies   Skin  Open Sores: denies  Nodules or Lumps: denies  Rash: denies    Neurological  Loss of Strength Since last Visit: admits  Tingling/Numbness: admits  Balance: denies   Psychiatric  Anxiety: denies  Depressed Mood: denies             PHYSICAL EXAM:   There were no vitals taken for this visit.    There is no height or weight on file to calculate BMI.      General: No immediate distress, she avoids extending her right hand to shake it.  Head: Normocephalic/ Atraumatic  Extremities: No upper extremity edema bilaterally. Peripheral pulses intact. Positive Finkelstein test bilaterally.  Spine:  full and painfree cervical ROM in all directions, diffuse soft tissue tenderness of the cervical and periscapular region bilaterally  Shoulders: full and painfree ROM   Neuro:   Cognition: alert & oriented x 3, attentive, able to follow 2 step commands, comprehention intact, spontaneous speech intact  Strength: Upper extremities have 5/5 strength  Sensation: Normal upper extremities  Reflexes: Normal upper extremities, no hyperreflexia, negative Eugenia's and no clonus  Spurling's sign: neg    Data    Radiology Imaging:  I personally reviewed a plain film x-ray of the cervical spine showing intact ACDF hardware between C4 and C6.  There are also pedicle screws at C4 and C5 on the right and C4, C5 and C6 on the left.  I personally reviewed a cervical MRI showing straightening of the usual lordosis, subadjacent disc bulging.      ASSESSMENT AND PLAN:  1. Right arm pain  Working diagnoses are bilateral de Quervain's tenosynovitis, fibromyalgia, collagen vascular disease, chronic cervical radiculopathy, recurrent carpal tunnel syndrome, recurrent ulnar nerve syndrome.  Start Mobic and Voltaren gel for the thumb pain until we can obtain an EMG.      2. S/P cervical spinal fusion  No significant neurological compression on recent MRI.  No neurological deficits on examination.  Continue Lyrica for soft tissue pain and possible chronic radiculopathy.    3. Bilateral carpal tunnel syndrome  She will  need an EMG.  There is an extensive neurological differential diagnosis as per above.  - EMG (at [facility]); Future    4. Age-related osteoporosis without current pathological fracture  No steroids, limits treatment options.            RTC: For EMG      The patient was in agreement with the assessment and plan.  All questions were answered.         Husam Arreola MD  Physical Medicine and Rehabilitation/Sports Medicine  [facility]

## 2024-06-06 RX ORDER — MELOXICAM 15 MG/1
15 TABLET ORAL DAILY
Qty: 30 TABLET | Refills: 0 | Status: SHIPPED | OUTPATIENT
Start: 2024-06-06

## 2024-06-06 NOTE — TELEPHONE ENCOUNTER
Refill Request    Medication request: Meloxicam 15 MG Oral Tab. Take 1 tablet (15 mg total) by mouth daily.     LOV:5/13/2024 Husam Arreola MD   Due back to clinic per last office note: Per Dr. Arreola: \"RTC: For EMG.\"  NOV: 7/18/2024 Husam Arreola MD      [place]: 05/13/2024 #30    Urine drug screen (if applicable): n/a  Pain contract: n/a    LOV plan (if weaning or changing medications): Per Dr. Arreola: \"Start Mobic and Voltaren gel for the thumb pain until we can obtain an EMG.\"

## 2024-06-27 ENCOUNTER — HOSPITAL ENCOUNTER (OUTPATIENT)
Dept: CV DIAGNOSTICS | Facility: HOSPITAL | Age: 64
Discharge: HOME | End: 2024-06-27
Attending: FAMILY MEDICINE
Payer: MEDICARE

## 2024-06-27 DIAGNOSIS — I34.0 MILD MITRAL REGURGITATION: ICD-10-CM

## 2024-06-27 PROCEDURE — 93306 TTE W/DOPPLER COMPLETE: CPT | Performed by: FAMILY MEDICINE

## 2024-07-01 ENCOUNTER — TELEPHONE (OUTPATIENT)
Dept: FAMILY MEDICINE CLINIC | Facility: CLINIC | Age: 64
End: 2024-07-01

## 2024-07-01 NOTE — TELEPHONE ENCOUNTER
Spoke with patient and she complained of difficulty breathing only when going up staircase.     Symptom onset: 1 year ago, not worsening but more noticeable the past couple months    Advised patient to go to Er if any difficulty breathing or worsening symptoms.   Patient denies difficulty breathing at rest or doing other activities.   Patient denies wheezing, shortness of breath or chest pain at this time.     Patient is asking for provider's recommendation.

## 2024-07-02 ENCOUNTER — OFFICE VISIT (OUTPATIENT)
Dept: FAMILY MEDICINE CLINIC | Facility: CLINIC | Age: 64
End: 2024-07-02
Payer: MEDICARE

## 2024-07-02 VITALS
SYSTOLIC BLOOD PRESSURE: 116 MMHG | DIASTOLIC BLOOD PRESSURE: 64 MMHG | WEIGHT: 153 LBS | TEMPERATURE: 98 F | HEIGHT: 64 IN | RESPIRATION RATE: 18 BRPM | BODY MASS INDEX: 26.12 KG/M2 | HEART RATE: 86 BPM

## 2024-07-02 DIAGNOSIS — I87.2 VENOUS INSUFFICIENCY OF BOTH LOWER EXTREMITIES: ICD-10-CM

## 2024-07-02 DIAGNOSIS — Z23 ENCOUNTER FOR IMMUNIZATION: ICD-10-CM

## 2024-07-02 DIAGNOSIS — M79.604 PAIN IN BOTH LOWER EXTREMITIES: Primary | ICD-10-CM

## 2024-07-02 DIAGNOSIS — M79.605 PAIN IN BOTH LOWER EXTREMITIES: Primary | ICD-10-CM

## 2024-07-02 DIAGNOSIS — R06.02 SOB (SHORTNESS OF BREATH): ICD-10-CM

## 2024-07-02 PROCEDURE — 99214 OFFICE O/P EST MOD 30 MIN: CPT | Performed by: FAMILY MEDICINE

## 2024-07-02 RX ORDER — ALBUTEROL SULFATE 90 UG/1
2 AEROSOL, METERED RESPIRATORY (INHALATION) EVERY 4 HOURS PRN
Qty: 1 EACH | Refills: 0 | Status: SHIPPED | OUTPATIENT
Start: 2024-07-02

## 2024-07-02 RX ORDER — ROPINIROLE 0.25 MG/1
0.25 TABLET, FILM COATED ORAL NIGHTLY
Qty: 30 TABLET | Refills: 0 | Status: SHIPPED | OUTPATIENT
Start: 2024-07-02 | End: 2024-08-01

## 2024-07-02 NOTE — PROGRESS NOTES
[facility] Office Note  Chief Complaint:   Chief Complaint   Patient presents with    Shortness Of Breath     Pt sts having SOB when walking up the stairs.     Edema     Pt is having swelling in her lower legs.    Leg Pain     Pt sts legs feels like someone is pulling her tendons. The pain is mostly in her left leg and some in her right.   It wakes her up at night and she has to try to walk it out but the pain is so severe that she can't walk.        HPI:   This is a 63 year old female coming in for multiple complaints.  The patient states that she has had continued lower extremity swelling.  The patient recently did have   echocardiogram completed.  She states that the lower extremity swelling increases towards the end of the day.  There is no pain associated with this.  She also states that within the last couple weeks she has had more of an increase of shortness of breath with physical activity.  She also states that she has had a sensation of something pulling at her tendons when she is sitting for too long and starts to ambulate this also happens at night she states that has waking her up due to the increased amount of pain.      Past Medical History:    Anal cancer (HCC)    Calculus of kidney    Exposure to medical diagnostic radiation    1/2021    Lower extremity edema    Osteoporosis    Personal history of antineoplastic chemotherapy    1/2021 completed    Plantar fasciitis    PONV (postoperative nausea and vomiting)    nausea    Rectal cancer (HCC)     Past Surgical History:   Procedure Laterality Date    Colonoscopy N/A 09/24/2020    Procedure: COLONOSCOPY;  Surgeon: Gareth Eli MD;  Location:  ENDOSCOPY    Colonoscopy N/A 12/09/2021    Procedure: COLONOSCOPY WITH BIOPSY;  Surgeon: Gareth Eli MD;  Location:  ENDOSCOPY    Colonoscopy,biopsy N/A 05/05/2015    Procedure: COLONOSCOPY, POSSIBLE BIOPSY, POSSIBLE POLYPECTOMY 17159;  Surgeon: Jeronimo Borja  MD;  Location: [facility]    Colonoscopy,diagnostic  05/05/2015    2 splenic flexure polyps, diverticulosis    Colonoscopy,remv lesn,snare N/A 05/05/2015    Procedure: COLONOSCOPY, POSSIBLE BIOPSY, POSSIBLE POLYPECTOMY 00000;  Surgeon: Jeronimo Borja MD;  Location: [facility]    Hysterectomy  1988    1 ovary removed.    Oophorectomy  1988    one ovary removed.    Other  06/04/2018    CERVICAL 4 - CERVICAL 5, CERVICAL 5 - CERVICAL 6  ANTERIOR CERVICAL DISCECTOMY AND FUSION WITH INSTRUMENTATION, ALLOGRAFT AND  ALL INDICATED PROCEDURES    Other  09/09/2019    RIGHT CARPAL TUNNEL RELEASE     Other surgical history      breast implants    Other surgical history  09/2019    Ulnar transposition, carpal tunnel release right arm    Other surgical history  06/2018    C4-6 fusion    Other surgical history  11/18/2020    Port-A-Cath Placement with fluroscopy, left chest     Port, indwelling, imp      removed s/p chemo     Social History:  Social History     Socioeconomic History    Marital status:     Number of children: 1   Occupational History    Occupation: Works as    Tobacco Use    Smoking status: Never    Smokeless tobacco: Never   Vaping Use    Vaping status: Never Used   Substance and Sexual Activity    Alcohol use: Not Currently    Drug use: No    Sexual activity: Yes     Partners: Male     Birth control/protection: Hysterectomy   Other Topics Concern    Caffeine Concern Yes     Comment: 1-3 coffee per daily    Exercise Yes     Comment: occ- ON HOLD   Social History Narrative    , lives at home with     Has 1 daughter     Family History:  Family History   Problem Relation Age of Onset    Breast Cancer Mother 48    Other (osteoporosis) Mother     Heart Disorder Father 43        CABG x 5, had stents later in life    Diabetes Father     Cancer Paternal Grandmother         breast cancer, colon cancer    Diabetes Paternal Grandmother     Breast Cancer  Paternal Grandmother 50        50's?    Heart Disorder Paternal Grandfather          from AMI    Heart Disorder Maternal Aunt     Breast Cancer Maternal Aunt 70        70's?    Heart Disorder Maternal Aunt     Breast Cancer Maternal Aunt 70        70's?    Heart Disorder Maternal Aunt     Breast Cancer Maternal Aunt 60        60's    Breast Cancer Maternal Aunt 80        80?    Heart Disorder Paternal Uncle 30         from AMI    Heart Disorder Paternal Uncle 45         from AMI    Cancer Paternal Uncle 48         from lung cancer    Heart Disorder Paternal Uncle 38        CABG    Heart Disorder Paternal Uncle 46         from CAD    Diabetes Paternal Uncle     Heart Disorder Paternal Uncle 60         from CAD    Diabetes Paternal Uncle     Ovarian Cancer Paternal Cousin Female 13        passed away age 14     Allergies:  Allergies   Allergen Reactions    Dander ANAPHYLAXIS     Dogs and cats     Iodine (Topical) HIVES and TONGUE SWELLING     Fish iodine     Radiology Contrast Iodinated Dyes ANAPHYLAXIS and HIVES    Skin Adhesives RASH     Skin glue, dermabond      Current Meds:  Current Outpatient Medications   Medication Sig Dispense Refill    rOPINIRole 0.25 MG Oral Tab Take 1 tablet (0.25 mg total) by mouth at bedtime. 30 tablet 0    albuterol 108 (90 Base) MCG/ACT Inhalation Aero Soln Inhale 2 puffs into the lungs every 4 (four) hours as needed for Wheezing. 1 each 0    MELOXICAM 15 MG Oral Tab Take 1 tablet by mouth once daily 30 tablet 0    traMADol 50 MG Oral Tab Take 1-2 tablets ( mg total) by mouth every 6 (six) hours as needed for Pain. 40 tablet 0    Ibandronate Sodium 150 MG Oral Tab Take 1 tablet (150 mg total) by mouth every 30 (thirty) days. 3 tablet 3    traZODone 100 MG Oral Tab Take 1.5 tablets (150 mg total) by mouth nightly. 135 tablet 1    pregabalin 100 MG Oral Cap TAKE 1 CAPSULE BY MOUTH ONCE DAILY IN THE EVENING 90 capsule 1      Counseling given: Not Answered        REVIEW OF SYSTEMS:   Consitutional: No fevers, chills, sweats  Eye: No recent visual problems  ENMT: No ear pain nasal congestion sore throat  Respiratory: Positive shortness of breath, cough  Cardiovascular: No chest pain palpitations syncope  Gastrointestinal: No nausea vomiting diarrhea  Genitourinary: No hematuria  Hema/Lymph no bruising tendency, swollen lymph glands  Endocrine: Negative for excessive thirst excessive hunger  Musculoskeletal: No back pain neck pain joint pain muscle pain decreased range of motion positive bilateral leg pain    Medical, surgical, family, and social histories were reviewed      EXAM:   VITALS:   Vitals:    07/02/24 1327   BP: 116/64   Pulse: 86   Resp: 18   Temp: 97.8 °F (36.6 °C)      GENERAL: well developed, well nourished, in no apparent distress  SKIN: no rashes, no suspicious lesions: Cool and Dry  HEENT: atraumatic, normocephalic, ears and throat are clear    Ears: TM's clear and visible bilaterally, no excess cerumen or erythema.   EYES: Pupils equal round and reactive.  Extraocular motions intact no scleral icterus no injection or drainage  THROAT without erythema tonsillar hypertrophy or exudate.  Uvula midline airway patent  NECK: Given midline.  No JVD or lymphadenopathy supple nontender no meningeal signs   LUNGS: clear to auscultation sounds equal bilaterally no wheezes rales or rhonchi  CARDIO: Regular rate and rhythm without murmurs gallops or rubs  GI: Soft nontender nondistended no hepatomegaly palpable masses.  No guarding.   without deformity or crepitus no flank tenderness  EXTREMITIES: no cyanosis, clubbing or edema no joint tenderness effusion or edema noted.      ASSESSMENT AND PLAN:   1. Pain in both lower extremities  - rOPINIRole 0.25 MG Oral Tab; Take 1 tablet (0.25 mg total) by mouth at bedtime.  Dispense: 30 tablet; Refill: 0  I did discuss with the patient that she may have a component of restless leg syndrome at this point I did  discuss with her that we could try ropinirole at bedtime to see if this helps alleviate some of the discomfort that she has been having.  She was also asked to use turmeric over-the-counter as well as high rich potassium foods for now.  2. Venous insufficiency of both lower extremities  I did discuss with the patient that the lower extremity swelling that she has been having is likely venous insufficiency.  Her echocardiogram was within normal limits.  I did discuss with her to watch her salt intake as well as to use compression stockings.  3. SOB (shortness of breath)  - albuterol 108 (90 Base) MCG/ACT Inhalation Aero Soln; Inhale 2 puffs into the lungs every 4 (four) hours as needed for Wheezing.  Dispense: 1 each; Refill: 0  I did discuss with the patient at this time would suggest using a rescue inhaler as needed for any episodes of shortness of breath.  We did discuss if she has any continued symptoms I would suggest for her to follow-up for possible Harvey function testing or referral to pulmonology.  4. Encounter for immunization       Meds & Refills for this Visit:  Requested Prescriptions     Signed Prescriptions Disp Refills    rOPINIRole 0.25 MG Oral Tab 30 tablet 0     Sig: Take 1 tablet (0.25 mg total) by mouth at bedtime.    albuterol 108 (90 Base) MCG/ACT Inhalation Aero Soln 1 each 0     Sig: Inhale 2 puffs into the lungs every 4 (four) hours as needed for Wheezing.       Health Maintenance:  Health Maintenance Due   Topic Date Due    Pneumococcal Vaccine: 65+ Years (1 of 2 - PCV) Never done    COVID-19 Vaccine (3 - 2023-24 season) 09/01/2023       Patient/Caregiver Education: Patient/Caregiver Education: There are no barriers to learning. Medical education done.   Outcome: Patient verbalizes understanding. Patient is notified to call with any questions, complications, allergies, or worsening or changing symptoms.  Patient is to call with any side effects or complications from the treatments as a  result of today.     Problem List:  Patient Active Problem List   Diagnosis    Vitamin D deficiency    Insomnia, unspecified    Osteoporosis    Cervical disc disorder at C5-C6 level with radiculopathy    Ulnar nerve entrapment at elbow    Bilateral carpal tunnel syndrome    Microscopic hematuria    S/P cervical spinal fusion    Adenosquamous carcinoma of anus (HCC)    Adenomatous polyp of colon    Nonrheumatic mitral valve regurgitation    Anal sphincter incontinence    Bone cyst of foot    History of anal cancer         No follow-ups on file.     Omero Higgins MD    Please note that portions of this note may have been completed with a voice recognition program. Efforts were made to edit the dictations but occasionally words are mis-transcribed.

## 2024-07-18 ENCOUNTER — PROCEDURE VISIT (OUTPATIENT)
Dept: PHYSICAL MEDICINE AND REHAB | Facility: CLINIC | Age: 64
End: 2024-07-18
Payer: MEDICARE

## 2024-07-18 DIAGNOSIS — M79.641 BILATERAL HAND PAIN: Primary | ICD-10-CM

## 2024-07-18 DIAGNOSIS — M79.642 BILATERAL HAND PAIN: Primary | ICD-10-CM

## 2024-07-18 PROCEDURE — 95886 MUSC TEST DONE W/N TEST COMP: CPT | Performed by: PHYSICAL MEDICINE & REHABILITATION

## 2024-07-18 PROCEDURE — 95910 NRV CNDJ TEST 7-8 STUDIES: CPT | Performed by: PHYSICAL MEDICINE & REHABILITATION

## 2024-07-18 NOTE — PROGRESS NOTES
[facility]  Electromyography Consultation      History of Present Illness:    I had the opportunity to see Hina Martin for electrodiagnostic consultation today.The patient is a 63 year old female with a chief complaint of bilateral upper extremity pain with a history of 2 cervical spine operations for symptoms of neck and right arm pain.  As she had persistent right arm pain, she then underwent right carpal tunnel and ulnar decompression by Dr. Lee.  After that she was 95% better.  In the interim she has been diagnosed with anal cancer which is now in remission.  In mid March she experienced a recurrence of right arm pain with radiation into the middle and ring fingers bilaterally.  Some of her physicians feel she has soft tissue pain and therefore she is on Lyrica.    PAST MEDICAL HISTORY:  Past Medical History:    Anal cancer (HCC)    Calculus of kidney    Exposure to medical diagnostic radiation    1/2021    Lower extremity edema    Osteoporosis    Personal history of antineoplastic chemotherapy    1/2021 completed    Plantar fasciitis    PONV (postoperative nausea and vomiting)    nausea    Rectal cancer (HCC)       SURGICAL HISTORY:  Past Surgical History:   Procedure Laterality Date    Colonoscopy N/A 09/24/2020    Procedure: COLONOSCOPY;  Surgeon: Gareth Eli MD;  Location:  ENDOSCOPY    Colonoscopy N/A 12/09/2021    Procedure: COLONOSCOPY WITH BIOPSY;  Surgeon: Gareth Eli MD;  Location:  ENDOSCOPY    Colonoscopy,biopsy N/A 05/05/2015    Procedure: COLONOSCOPY, POSSIBLE BIOPSY, POSSIBLE POLYPECTOMY 41754;  Surgeon: Jeronimo Borja MD;  Location: [facility]    Colonoscopy,diagnostic  05/05/2015    2 splenic flexure polyps, diverticulosis    Colonoscopy,remv lesn,snare N/A 05/05/2015    Procedure: COLONOSCOPY, POSSIBLE BIOPSY, POSSIBLE POLYPECTOMY 57041;  Surgeon: Jeronimo Borja MD;  Location: [facility]  [place], [facility]    Hysterectomy      1 ovary removed.    Oophorectomy      one ovary removed.    Other  2018    CERVICAL 4 - CERVICAL 5, CERVICAL 5 - CERVICAL 6  ANTERIOR CERVICAL DISCECTOMY AND FUSION WITH INSTRUMENTATION, ALLOGRAFT AND  ALL INDICATED PROCEDURES    Other  2019    RIGHT CARPAL TUNNEL RELEASE     Other surgical history      breast implants    Other surgical history  2019    Ulnar transposition, carpal tunnel release right arm    Other surgical history  2018    C4-6 fusion    Other surgical history  2020    Port-A-Cath Placement with fluroscopy, left chest     Port, indwelling, imp      removed s/p chemo       SOCIAL HISTORY:   Social History     Occupational History    Occupation: Works as    Tobacco Use    Smoking status: Never    Smokeless tobacco: Never   Vaping Use    Vaping status: Never Used   Substance and Sexual Activity    Alcohol use: Not Currently    Drug use: No    Sexual activity: Yes     Partners: Male     Birth control/protection: Hysterectomy       FAMILY HISTORY:   Family History   Problem Relation Age of Onset    Breast Cancer Mother 48    Other (osteoporosis) Mother     Heart Disorder Father 43        CABG x 5, had stents later in life    Diabetes Father     Cancer Paternal Grandmother         breast cancer, colon cancer    Diabetes Paternal Grandmother     Breast Cancer Paternal Grandmother 50        50's?    Heart Disorder Paternal Grandfather          from AMI    Heart Disorder Maternal Aunt     Breast Cancer Maternal Aunt 70        70's?    Heart Disorder Maternal Aunt     Breast Cancer Maternal Aunt 70        70's?    Heart Disorder Maternal Aunt     Breast Cancer Maternal Aunt 60        60's    Breast Cancer Maternal Aunt 80        80?    Heart Disorder Paternal Uncle 30         from AMI    Heart Disorder Paternal Uncle 45         from AMI    Cancer Paternal Uncle 48         from lung cancer    Heart Disorder  Paternal Uncle 38        CABG    Heart Disorder Paternal Uncle 46         from CAD    Diabetes Paternal Uncle     Heart Disorder Paternal Uncle 60         from CAD    Diabetes Paternal Uncle     Ovarian Cancer Paternal Cousin Female 13        passed away age 14       CURRENT MEDICATIONS:   Current Outpatient Medications   Medication Sig Dispense Refill    rOPINIRole 0.25 MG Oral Tab Take 1 tablet (0.25 mg total) by mouth at bedtime. 30 tablet 0    albuterol 108 (90 Base) MCG/ACT Inhalation Aero Soln Inhale 2 puffs into the lungs every 4 (four) hours as needed for Wheezing. 1 each 0    MELOXICAM 15 MG Oral Tab Take 1 tablet by mouth once daily 30 tablet 0    traMADol 50 MG Oral Tab Take 1-2 tablets ( mg total) by mouth every 6 (six) hours as needed for Pain. 40 tablet 0    Ibandronate Sodium 150 MG Oral Tab Take 1 tablet (150 mg total) by mouth every 30 (thirty) days. 3 tablet 3    traZODone 100 MG Oral Tab Take 1.5 tablets (150 mg total) by mouth nightly. 135 tablet 1    pregabalin 100 MG Oral Cap TAKE 1 CAPSULE BY MOUTH ONCE DAILY IN THE EVENING 90 capsule 1       PHYSICAL EXAM:   There were no vitals taken for this visit.    There is no height or weight on file to calculate BMI.      General: No immediate distress   Extremities: No upper extremity edema bilaterally. Peripheral pulses intact. Positive Finkelstein test bilaterally.  Spine:  full and painfree cervical ROM in all directions, diffuse soft tissue tenderness of the cervical and periscapular region bilaterally  Shoulders: full and painfree ROM   Neuro:   Cognition: alert & oriented x 3, attentive, able to follow 2 step commands, comprehention intact, spontaneous speech intact  Strength: Upper extremities have 5/5 strength  Sensation: Normal upper extremities  Reflexes: Normal upper extremities, no hyperreflexia, negative Eugenia's and no clonus  Spurling's sign: neg    EMG/NCV  Sensory NCS      Nerve / Sites Distance Segments Peak Lat NP  Amp    cm  ms µV   R MEDIAN - Dig III Antidr      Wrist 14 Wrist - Dig III 3.00 25.3      Ref.  Ref. 3.80 20.0      Palm 7 Palm - Dig III 1.95 36.1   L MEDIAN - Dig III Antidr      Wrist 14 Wrist - Dig III 3.50 22.5      Ref.  Ref. 3.80 20.0      Palm 7 Palm - Dig III 1.90 24.2   R ULNAR - Dig V Antidr      Wrist 14 Wrist - Dig V 2.90 23.2      Ref.  Ref. 3.80 10.0   L ULNAR - Dig V Antidr      Wrist 14 Wrist - Dig V 3.20 33.9      Ref.  Ref. 3.80 10.0       Motor NCS      Nerve / Sites Distance Segments Latency Amplitude Velocity Amp.1-2 Peak Dur. Area    cm  ms mV m/s % ms mVms   R MEDIAN - APB      Wrist 8 Wrist - APB 3.25 5.0  100 5.50 15.8      Ref.  Ref. 4.40 4.0          Elbow 21.5 Elbow - Wrist 7.25 5.2 53.8 104 5.55 16.1      Ref.  Ref.   49.0      L MEDIAN - APB      Wrist 8 Wrist - APB 3.55 7.4  100 5.85 25.3      Ref.  Ref. 4.40 4.0          Elbow 19.5 Elbow - Wrist 7.30 7.3 52.0 98.6 6.05 25.0      Ref.  Ref.   49.0      R ULNAR - ADM      Wrist 8 Wrist - ADM 2.45 11.6  100 5.55 29.9      Ref.  Ref. 4.20 5.0          B.Elbow 19 B.Elbow - Wrist 5.65 11.2 59.4 97 5.65 30.0      Ref.  Ref.   50.0         A.Elbow 9 A.Elbow - B.Elbow 7.20 10.7 58.1 92.3 5.85 28.5   L ULNAR - ADM      Wrist 8 Wrist - ADM 2.45 9.2  100 5.80 32.1      Ref.  Ref. 4.20 5.0          B.Elbow 18.5 B.Elbow - Wrist 5.55 9.4 59.7 102 6.10 32.5      Ref.  Ref.   50.0         A.Elbow 8.5 A.Elbow - B.Elbow 6.80 9.2 68.0 99.4 6.10 30.9       EMG Summary Table     Spontaneous MUAP Recruitment    IA Fib PSW Fasc H.F. Amp Dur. PPP Pattern   R. DELTOID N None None None None N N N N   R. TRICEPS N None None None None N N N N   R. BICEPS N None None None None N N N N   R. FLEX CARPI RAD N None None None None N N N N   R. FIRST D INTEROSS N None None None None N N N N   R. ABD POLL BREVIS N None None None None N N N N   L. DELTOID N None None None None N N N N   L. TRICEPS N None None None None N N N N   L. BICEPS N None None None None N N N N    L. FLEX CARPI RAD N None None None None N N N N   L. FIRST D INTEROSS N None None None None N N N N   L. ABD POLL BREVIS N None None None None N N N N       Findings: Extremities were warmed with hot packs for 15 minutes prior to testing.  Sensory nerve conduction studies revealed normal and symmetric median and ulnar responses.  Motor nerve conduction studies also revealed normal and symmetric median and ulnar responses with normal latencies, amplitudes and conduction velocities.  No conduction block or conduction delay across the elbow specifically for the ulnar nerve.  Needle EMG of the bilateral upper extremities was normal in all muscles tested.  Please see EMG summary table above.  Impression:  1.  Normal study  2.  No electrodiagnostic evidence of median neuropathy bilaterally.  3.  No electrodiagnostic evidence of ulnar neuropathy bilaterally.  4.  No electrodiagnostic evidence of cervical radiculopathy bilaterally.      ASSESSMENT AND PLAN:  1. Bilateral hand pain  The patient's EMG is negative for any residual spinal or peripheral nerve entrapment.  Her arm symptoms seem to be primarily soft tissue and orthopedic in origin.  Specifically she seems to have bilateral de Quervain's tenosynovitis.  The patient has an appointment with me soon and we will address that with an injection and hand therapy if appropriate.  She may also have fibromyalgia or collagen vascular disease which would be best addressed by rheumatology.        Husam Arreola M.D.  Diplomate [employer] Board of Physical Medicine and Rehabilitation

## 2024-07-19 ENCOUNTER — OFFICE VISIT (OUTPATIENT)
Dept: PHYSICAL MEDICINE AND REHAB | Facility: CLINIC | Age: 64
End: 2024-07-19
Payer: MEDICARE

## 2024-07-19 DIAGNOSIS — Z98.1 S/P CERVICAL SPINAL FUSION: ICD-10-CM

## 2024-07-19 DIAGNOSIS — M65.4 DE QUERVAIN'S TENOSYNOVITIS, BILATERAL: Primary | ICD-10-CM

## 2024-07-19 DIAGNOSIS — M81.0 AGE-RELATED OSTEOPOROSIS WITHOUT CURRENT PATHOLOGICAL FRACTURE: ICD-10-CM

## 2024-07-19 DIAGNOSIS — M25.50 POLYARTHRALGIA: ICD-10-CM

## 2024-07-19 RX ORDER — TRIAMCINOLONE ACETONIDE 40 MG/ML
80 INJECTION, SUSPENSION INTRA-ARTICULAR; INTRAMUSCULAR ONCE
Status: COMPLETED | OUTPATIENT
Start: 2024-07-19 | End: 2024-07-19

## 2024-07-19 RX ORDER — LIDOCAINE HYDROCHLORIDE 10 MG/ML
4 INJECTION, SOLUTION INFILTRATION; PERINEURAL ONCE
Status: COMPLETED | OUTPATIENT
Start: 2024-07-19 | End: 2024-07-19

## 2024-07-22 ENCOUNTER — HOSPITAL ENCOUNTER (OUTPATIENT)
Dept: CT IMAGING | Facility: HOSPITAL | Age: 64
Discharge: HOME | End: 2024-07-22
Attending: INTERNAL MEDICINE
Payer: MEDICARE

## 2024-07-22 DIAGNOSIS — C21.0 ANAL CANCER (HCC): ICD-10-CM

## 2024-07-22 PROCEDURE — 71250 CT THORAX DX C-: CPT | Performed by: INTERNAL MEDICINE

## 2024-07-26 ENCOUNTER — TELEPHONE (OUTPATIENT)
Dept: PHYSICAL MEDICINE AND REHAB | Facility: CLINIC | Age: 64
End: 2024-07-26

## 2024-07-26 NOTE — TELEPHONE ENCOUNTER
Pt phoned to speak to a clinical staff member to request refill of Tramadol due to pain in hands and wrists.  Please send prescription to [facility] on [address] - [place]

## 2024-07-29 NOTE — PROGRESS NOTES
[facility] Note    Diagnosis: cT2 N0 MX Anal SCC    Treatment History:  1. ChemoRT with 5-FU 4000 mg/m2 D1-4 and D29-32 + MMC 10 mg/m2 on D1 and D29: 11/30/20 to 01/08/21    Visit Diagnosis:  1. Anal cancer (HCC)        History of Present Illness: 63F with a PMH of Osteoporosis referred by Dr. Eli for anal cancer. Today is follow up for cT2 N0 Mx Anal SCCa. She had C1D29 of 5-FU + MMC on 12/28/20. She completed RT on 1/8/21. Her follow up imaging from 05/2021 shows OPAL.    Hematology/Oncology History:   She presented to see Dr. Eli on 9/14/20 due to hematochezia. This has been going on for 6 months associated with anal pain. Stool consistent has changed. Due to pain, she has a difficult time sitting. No prolapse. Her work up is below. Colonoscopy in 2015 with polyps. Pap was normal in 2018. Paternal grandfather with colon cancer. Mother with breast cancer. No smoking or etoh abuse. She is having issues with both neck and anal pain. She has not taken her norco or flexaril in fear of being addicted.     9/24/20: Colonoscopy with Dr. Eli: mass originating at dentate line--moderately differentiated non-keratinizing squamous cells, positive for CK4, p40, p16 and negative for CK20 and CDX2. Small population with CK7 positive cell. Overall--squamous cell carcinoma of anus, p16 positive. Possible small component of adenosquamous.     10/2/20: MRI Rectal 3T: pedunculated intra-luminal mass, measures 4.4 x 3.3 x 1.9 cm; along the right side there is a complex cyst (2.2 x 2.2 x 1.9 cm). Clinical T3c N0.      10/5/20: Discussed at TB--plan for PET/CT followed by excision of mass to further evaluate. Will need to evaluate cystic fluid. CEA 11.6.    10/9/20: PET/CT: Abnormal uptake in rectal/anal mass with SUV 15.9.     10/14/20: Anal canal mass excision and Right Lateral rectal sub-mucosal lesion: poorly differentiated carcinoma with squamous differentiation.     11/9/20: Normal Pap and negative  HPV.    11/30/20: Started ChemoRT with 5-FU + MMC  D1: 11/30/20  --Required G-CSF due to  on 12/15/20--  D29: 12/28/20  RT done on 1/8/21 05/06/21: CT Chest/Abdomen and MR Rectal protocol: There is no evidence of diease. There is slight thickening of the lateral rectal mucosa related to previous surgery. Case was reviewed at Rect[facility] and there is OPAL but she needs to follow up with Dr. Eli.     Anal EUS on 12/9/21: No evidence of disease     CT Chest/Abd + MRI Rectal from 09/2022: OPAL    CT CAP 4/11/24: LLL pleural based nodule appears calcified, unclear if there previously. Otherwise OPAL.     Interval History:  - with a CVA recently   -Follow up CT chest showed resolution of the previous LLL pleural nodule. Stable 2 mm LLL nodule. Will repeat CT chest in 3 months  -follow up with surgery not set up yet  -Dealing with shooting pains in her legs. Has some lower back pain as well. No incontinence.   -Has occasional dyspnea. No cough or chest pain. Started on albuterol recently     Review of Systems: 12 Point ROS was completed and pertinent positives are in the HPI    Current Outpatient Medications on File Prior to Visit   Medication Sig Dispense Refill    albuterol 108 (90 Base) MCG/ACT Inhalation Aero Soln Inhale 2 puffs into the lungs every 4 (four) hours as needed for Wheezing. 1 each 0    MELOXICAM 15 MG Oral Tab Take 1 tablet by mouth once daily 30 tablet 0    Ibandronate Sodium 150 MG Oral Tab Take 1 tablet (150 mg total) by mouth every 30 (thirty) days. 3 tablet 3    traZODone 100 MG Oral Tab Take 1.5 tablets (150 mg total) by mouth nightly. 135 tablet 1    pregabalin 100 MG Oral Cap TAKE 1 CAPSULE BY MOUTH ONCE DAILY IN THE EVENING 90 capsule 1    rOPINIRole 0.25 MG Oral Tab Take 1 tablet (0.25 mg total) by mouth at bedtime. (Patient not taking: Reported on 7/30/2024) 30 tablet 0    traMADol 50 MG Oral Tab Take 1-2 tablets ( mg total) by mouth every 6 (six) hours as needed for Pain.  (Patient not taking: Reported on 7/30/2024) 40 tablet 0     Current Facility-Administered Medications on File Prior to Visit   Medication Dose Route Frequency Provider Last Rate Last Admin    [COMPLETED] lidocaine (Xylocaine) 1 % injection  4 mL Intradermal Once Husam Arreola MD   4 mL at 07/19/24 1052    [COMPLETED] triamcinolone acetonide (Kenalog-40) 40 MG/ML injection 80 mg  80 mg Intra-articular Once Husam Arreola MD   80 mg at 07/19/24 1055     Past Medical History:    Anal cancer (HCC)    Calculus of kidney    Exposure to medical diagnostic radiation    1/2021    Lower extremity edema    Osteoporosis    Personal history of antineoplastic chemotherapy    1/2021 completed    Plantar fasciitis    PONV (postoperative nausea and vomiting)    nausea    Rectal cancer (HCC)     Past Surgical History:   Procedure Laterality Date    Colonoscopy N/A 09/24/2020    Procedure: COLONOSCOPY;  Surgeon: Gareth Eli MD;  Location:  ENDOSCOPY    Colonoscopy N/A 12/09/2021    Procedure: COLONOSCOPY WITH BIOPSY;  Surgeon: Gareth Eli MD;  Location:  ENDOSCOPY    Colonoscopy,biopsy N/A 05/05/2015    Procedure: COLONOSCOPY, POSSIBLE BIOPSY, POSSIBLE POLYPECTOMY 39264;  Surgeon: Jeronimo Borja MD;  Location: [facility]    Colonoscopy,diagnostic  05/05/2015    2 splenic flexure polyps, diverticulosis    Colonoscopy,remv lesn,snare N/A 05/05/2015    Procedure: COLONOSCOPY, POSSIBLE BIOPSY, POSSIBLE POLYPECTOMY 91968;  Surgeon: Jeronimo Borja MD;  Location: [facility]    Hysterectomy  1988    1 ovary removed.    Oophorectomy  1988    one ovary removed.    Other  06/04/2018    CERVICAL 4 - CERVICAL 5, CERVICAL 5 - CERVICAL 6  ANTERIOR CERVICAL DISCECTOMY AND FUSION WITH INSTRUMENTATION, ALLOGRAFT AND  ALL INDICATED PROCEDURES    Other  09/09/2019    RIGHT CARPAL TUNNEL RELEASE     Other surgical history      breast implants    Other surgical history  09/2019     Ulnar transposition, carpal tunnel release right arm    Other surgical history  2018    C4-6 fusion    Other surgical history  2020    Port-A-Cath Placement with fluroscopy, left chest     Port, indwelling, imp      removed s/p chemo     Social History     Socioeconomic History    Marital status:     Number of children: 1   Occupational History    Occupation: Works as    Tobacco Use    Smoking status: Never    Smokeless tobacco: Never   Vaping Use    Vaping status: Never Used   Substance and Sexual Activity    Alcohol use: Not Currently    Drug use: No    Sexual activity: Yes     Partners: Male     Birth control/protection: Hysterectomy      Family History   Problem Relation Age of Onset    Breast Cancer Mother 48    Other (osteoporosis) Mother     Heart Disorder Father 43        CABG x 5, had stents later in life    Diabetes Father     Cancer Paternal Grandmother         breast cancer, colon cancer    Diabetes Paternal Grandmother     Breast Cancer Paternal Grandmother 50        50's?    Heart Disorder Paternal Grandfather          from AMI    Heart Disorder Maternal Aunt     Breast Cancer Maternal Aunt 70        70's?    Heart Disorder Maternal Aunt     Breast Cancer Maternal Aunt 70        70's?    Heart Disorder Maternal Aunt     Breast Cancer Maternal Aunt 60        60's    Breast Cancer Maternal Aunt 80        80?    Heart Disorder Paternal Uncle 30         from AMI    Heart Disorder Paternal Uncle 45         from AMI    Cancer Paternal Uncle 48         from lung cancer    Heart Disorder Paternal Uncle 38        CABG    Heart Disorder Paternal Uncle 46         from CAD    Diabetes Paternal Uncle     Heart Disorder Paternal Uncle 60         from CAD    Diabetes Paternal Uncle     Ovarian Cancer Paternal Cousin Female 13        passed away age 14       Physical Exam  Height: --  Weight: 68 kg (150 lb) ( 1522)  BSA (Calculated - sq m): --  Pulse: 87  (07/30 1522)  BP: 144/80 (07/30 1522)  Temp: 98 °F (36.7 °C) (07/30 1522)  Do Not Use - Resp Rate: --  SpO2: 98 % (07/30 1522)     General: NAD, AOX3  HEENT:  no jvd, no scleral icterus  LN: No inguinal LAD  CV: RRR S1S2  Lungs: CTAB, no increased WOB  Extremities: No edema   Abd: soft nt nd  Lungs:  no increased work of breathing CTAB  Neuro: CN: II-XII grossly intact    Results:reviewed   Lab Results   Component Value Date    WBC 3.3 (L) 04/18/2024    HGB 13.4 04/18/2024    HCT 40.8 04/18/2024    MCV 93.4 04/18/2024    .0 04/18/2024    EOSABS 0.00 12/21/2020     Lab Results   Component Value Date     04/18/2024    K 4.7 04/18/2024    CO2 27.0 04/18/2024     04/18/2024    BUN 14 04/18/2024    ALB 3.6 04/18/2024       Radiology: I personally reviewed the imaging    Pathology: reviewed     Assessment:   Anal Squamous Cell Ca: cT2 N0 Mx, p16 positive  -S/P Anal canal mass and Right sub-mucosal mass excision with Dr. Eli on 10/14/20  -Gynecology exam was normal  -ChemoRT with 5-FU/MMC on 11/30/20 to 1/8/21  -CEA normalized (11.6 to 0.8) on 12/8/20  -Discussed She likely has a favorable OS with 5 year OS > 80% and <20 Locoregional failure.   -She is s/p 3 years of imaging. No further imaging recommended. No evidence of recurrence   -Continue to follow up with surgery-she has not scheduled   -q6 month visit     LLL Nodule: 3 mm and calcified. Possibly related to RSV. Repeat CT chest shows resolution. There is a 2 mm LLL nodule that we will follow with a repeat CT chest in 3 months.     Lower back pain with radiation down legs  -Previously was following with neurosurgery. Has a history of DJD and cervical stenosis. Will add an MRI Lumbar/Sacral plexus She will follow up with NSGY    Dyspnea: following PCP. Recently started on albuterol. She would like to establish with pulmonary     -Check CBC, Fe studies, B12, Folate, CMP today    ZAHRA Lindquist MD  [facility]

## 2024-07-30 ENCOUNTER — OFFICE VISIT (OUTPATIENT)
Dept: HEMATOLOGY/ONCOLOGY | Facility: HOSPITAL | Age: 64
End: 2024-07-30
Attending: INTERNAL MEDICINE
Payer: MEDICARE

## 2024-07-30 VITALS
SYSTOLIC BLOOD PRESSURE: 144 MMHG | RESPIRATION RATE: 18 BRPM | TEMPERATURE: 98 F | HEART RATE: 87 BPM | OXYGEN SATURATION: 98 % | WEIGHT: 150 LBS | DIASTOLIC BLOOD PRESSURE: 80 MMHG | BODY MASS INDEX: 26 KG/M2

## 2024-07-30 DIAGNOSIS — M54.12 CERVICAL RADICULOPATHY: ICD-10-CM

## 2024-07-30 DIAGNOSIS — C21.0 ANAL CANCER (HCC): Primary | ICD-10-CM

## 2024-07-30 LAB
ALBUMIN SERPL-MCNC: 4.6 G/DL (ref 3.2–4.8)
ALBUMIN/GLOB SERPL: 2 {RATIO} (ref 1–2)
ALP LIVER SERPL-CCNC: 63 U/L
ALT SERPL-CCNC: 27 U/L
ANION GAP SERPL CALC-SCNC: 6 MMOL/L (ref 0–18)
AST SERPL-CCNC: 23 U/L (ref ?–34)
BASOPHILS # BLD AUTO: 0.03 X10(3) UL (ref 0–0.2)
BASOPHILS NFR BLD AUTO: 0.5 %
BILIRUB SERPL-MCNC: 0.5 MG/DL (ref 0.2–1.1)
BUN BLD-MCNC: 26 MG/DL (ref 9–23)
CALCIUM BLD-MCNC: 9.5 MG/DL (ref 8.7–10.4)
CHLORIDE SERPL-SCNC: 107 MMOL/L (ref 98–112)
CO2 SERPL-SCNC: 26 MMOL/L (ref 21–32)
CREAT BLD-MCNC: 0.98 MG/DL
DEPRECATED HBV CORE AB SER IA-ACNC: 60.5 NG/ML
EGFRCR SERPLBLD CKD-EPI 2021: 64 ML/MIN/1.73M2 (ref 60–?)
EOSINOPHIL # BLD AUTO: 0.03 X10(3) UL (ref 0–0.7)
EOSINOPHIL NFR BLD AUTO: 0.5 %
ERYTHROCYTE [DISTWIDTH] IN BLOOD BY AUTOMATED COUNT: 13.3 %
FOLATE SERPL-MCNC: >24 NG/ML (ref 5.4–?)
GLOBULIN PLAS-MCNC: 2.3 G/DL (ref 2–3.5)
GLUCOSE BLD-MCNC: 99 MG/DL (ref 70–99)
HCT VFR BLD AUTO: 38.9 %
HGB BLD-MCNC: 13.4 G/DL
IMM GRANULOCYTES # BLD AUTO: 0.02 X10(3) UL (ref 0–1)
IMM GRANULOCYTES NFR BLD: 0.3 %
IRON SATN MFR SERPL: 32 %
IRON SERPL-MCNC: 97 UG/DL
LYMPHOCYTES # BLD AUTO: 1.13 X10(3) UL (ref 1–4)
LYMPHOCYTES NFR BLD AUTO: 17.1 %
MAGNESIUM SERPL-MCNC: 2.1 MG/DL (ref 1.6–2.6)
MCH RBC QN AUTO: 31.4 PG (ref 26–34)
MCHC RBC AUTO-ENTMCNC: 34.4 G/DL (ref 31–37)
MCV RBC AUTO: 91.1 FL
MONOCYTES # BLD AUTO: 0.26 X10(3) UL (ref 0.1–1)
MONOCYTES NFR BLD AUTO: 3.9 %
NEUTROPHILS # BLD AUTO: 5.13 X10 (3) UL (ref 1.5–7.7)
NEUTROPHILS # BLD AUTO: 5.13 X10(3) UL (ref 1.5–7.7)
NEUTROPHILS NFR BLD AUTO: 77.7 %
OSMOLALITY SERPL CALC.SUM OF ELEC: 293 MOSM/KG (ref 275–295)
PLATELET # BLD AUTO: 237 10(3)UL (ref 150–450)
POTASSIUM SERPL-SCNC: 4.4 MMOL/L (ref 3.5–5.1)
PROT SERPL-MCNC: 6.9 G/DL (ref 5.7–8.2)
RBC # BLD AUTO: 4.27 X10(6)UL
SODIUM SERPL-SCNC: 139 MMOL/L (ref 136–145)
TOTAL IRON BINDING CAPACITY: 300 UG/DL (ref 250–425)
TRANSFERRIN SERPL-MCNC: 246 MG/DL (ref 250–380)
VIT B12 SERPL-MCNC: 402 PG/ML (ref 211–911)
WBC # BLD AUTO: 6.6 X10(3) UL (ref 4–11)

## 2024-07-30 PROCEDURE — 99215 OFFICE O/P EST HI 40 MIN: CPT | Performed by: INTERNAL MEDICINE

## 2024-07-30 NOTE — PROGRESS NOTES
Patient here for follow-up. Denies any bowel issues,fevers, or nausea. Having new leg swelling and pain. States the pain is waking her 4-5x/week. States pain feels as though her tendons are being stretched. On lyrica. Not established with neurology. Had recent CT chest 7/22/24.

## 2024-08-01 RX ORDER — TRAMADOL HYDROCHLORIDE 50 MG/1
50 TABLET ORAL EVERY 6 HOURS PRN
Qty: 28 TABLET | Refills: 0 | OUTPATIENT
Start: 2024-08-01

## 2024-08-01 NOTE — TELEPHONE ENCOUNTER
At this time, I do not recommend further Tramadol refills. Tramadol is recommend in the short term for pain relief. Patient was referred to physiatry and is under going further work up. She was prescribed Voltaren gel and Meloxicam. I recommend she continue conservative treatment per physiatry. If no improvement, recommend follow up.     Please advise, thank you

## 2024-08-01 NOTE — TELEPHONE ENCOUNTER
Msg below from PA noted.    [email or web address] msg sent to pt to inform.    Nothing further needed at this time, closing encounter.

## 2024-08-01 NOTE — TELEPHONE ENCOUNTER
Medication:   traMADol 50 MG Oral Tab 40 tablet 0 5/9/2024 --   Sig:   Take 1-2 tablets ( mg total) by mouth every 6 (six) hours as needed for Pain.          Date of last refill: 5.9.24 (#40/0)  [place]      Last office visit: 5.9.24  Due back to clinic per last office note:    Date next office visit scheduled:    Future Appointments   Date Time Provider Department Center   8/14/2024  1:30 PM Husam Arreola MD PM&R [facility]   10/29/2024  1:00 PM Chaparrita Lindquist MD  [facility]           Last OV note recommendation:       \" PLAN:  Reviewed xrays and MRI with patient  2.   Discussed EMG for work up of arm complaints vs injections for the possible C7 radiculopathy.  Pt would like to try injections first and if no response then proceed with EMG   3.   Pt referred to physiatry for possible injections and EMG  4.   F/u after physiatry \"

## 2024-08-02 NOTE — TELEPHONE ENCOUNTER
I do not recommend long-term tramadol.  If necessary, please obtain from the physician previously prescribing, thank you.

## 2024-08-04 PROBLEM — M65.4 DE QUERVAIN'S TENOSYNOVITIS, BILATERAL: Status: ACTIVE | Noted: 2024-08-04

## 2024-08-04 PROBLEM — M25.50 POLYARTHRALGIA: Status: ACTIVE | Noted: 2024-08-04

## 2024-08-04 PROBLEM — G56.03 BILATERAL CARPAL TUNNEL SYNDROME: Status: RESOLVED | Noted: 2018-05-03 | Resolved: 2024-08-04

## 2024-08-04 NOTE — PROCEDURES
De Quervain's tendon sheath injection  Location: bilateral  After discussing benefits and possible side effects, we proceeded with a tendon sheath injection. The patient was consented.  The patient's hand was placed on the examination table.  The radial wrist was prepped in the usual sterile fashion with Betadine and alcohol.  A 25-gauge 1-1/2 inch needle was inserted.  There was no aspiration of blood or fluid.  I then injected a solution containing 1.0 ml of 40 mg/mL Kenalog and 2.0 ml of 1% lidocaine.  The needle was then withdrawn and a sterile dressing applied.      The same procedure was routed on the contralateral side.    The patient tolerated the injection without immediate complications.  The patient was given discharge instructions and is to follow-up as directed.

## 2024-08-04 NOTE — PROGRESS NOTES
[facility]  Progress Note    CHIEF COMPLAINT:    Chief Complaint   Patient presents with    Follow - Up     05/13/24 LOV. 07/18/24 EMG BUE.        History of Present Illness:  Hina Martin is a 63 year old female who presents today for follow up for symptoms of chronic bilateral hand pain.  I last saw her for an EMG of the upper extremities which was normal.    She has a long history of bilateral upper extremity pain with a history of 2 cervical spine operations for symptoms of neck and right arm pain.  As she had persistent right arm pain, she then underwent right carpal tunnel and ulnar decompression by Dr. Lee.  After that she was 95% better.  In the interim she has been diagnosed with anal cancer which is now in remission.  In mid March she experienced a recurrence of right arm pain with radiation into the middle and ring fingers bilaterally.  Some of her physicians feel she has soft tissue pain and therefore she is on Lyrica.     She is on pregabalin because one of her physicians thinks she might have a lot of soft tissue pain. She has chronic bilateral shoulder pain, chronic right wrist and bilateral thumb pain.  She states she has trigger fingers but no physician has diagnosed her with that. Additionally, she has been experiencing lumbar pain, using a cane     PAST MEDICAL HISTORY:  Past Medical History:    Anal cancer (HCC)    Calculus of kidney    Exposure to medical diagnostic radiation    1/2021    Lower extremity edema    Osteoporosis    Personal history of antineoplastic chemotherapy    1/2021 completed    Plantar fasciitis    PONV (postoperative nausea and vomiting)    nausea    Rectal cancer (HCC)       SURGICAL HISTORY:  Past Surgical History:   Procedure Laterality Date    Colonoscopy N/A 09/24/2020    Procedure: COLONOSCOPY;  Surgeon: Gareth Eli MD;  Location:  ENDOSCOPY    Colonoscopy N/A 12/09/2021    Procedure: COLONOSCOPY WITH  BIOPSY;  Surgeon: Gareth Eli MD;  Location:  ENDOSCOPY    Colonoscopy,biopsy N/A 05/05/2015    Procedure: COLONOSCOPY, POSSIBLE BIOPSY, POSSIBLE POLYPECTOMY 75183;  Surgeon: Jeronimo Borja MD;  Location: [facility]    Colonoscopy,diagnostic  05/05/2015    2 splenic flexure polyps, diverticulosis    Colonoscopy,remv lesn,snare N/A 05/05/2015    Procedure: COLONOSCOPY, POSSIBLE BIOPSY, POSSIBLE POLYPECTOMY 56799;  Surgeon: Jeronimo Borja MD;  Location: [facility]    Hysterectomy  1988    1 ovary removed.    Oophorectomy  1988    one ovary removed.    Other  06/04/2018    CERVICAL 4 - CERVICAL 5, CERVICAL 5 - CERVICAL 6  ANTERIOR CERVICAL DISCECTOMY AND FUSION WITH INSTRUMENTATION, ALLOGRAFT AND  ALL INDICATED PROCEDURES    Other  09/09/2019    RIGHT CARPAL TUNNEL RELEASE     Other surgical history      breast implants    Other surgical history  09/2019    Ulnar transposition, carpal tunnel release right arm    Other surgical history  06/2018    C4-6 fusion    Other surgical history  11/18/2020    Port-A-Cath Placement with fluroscopy, left chest     Port, indwelling, imp      removed s/p chemo       SOCIAL HISTORY:   Social History     Occupational History    Occupation: Works as    Tobacco Use    Smoking status: Never    Smokeless tobacco: Never   Vaping Use    Vaping status: Never Used   Substance and Sexual Activity    Alcohol use: Not Currently    Drug use: No    Sexual activity: Yes     Partners: Male     Birth control/protection: Hysterectomy       CURRENT MEDICATIONS:   Current Outpatient Medications   Medication Sig Dispense Refill    albuterol 108 (90 Base) MCG/ACT Inhalation Aero Soln Inhale 2 puffs into the lungs every 4 (four) hours as needed for Wheezing. 1 each 0    MELOXICAM 15 MG Oral Tab Take 1 tablet by mouth once daily 30 tablet 0    traMADol 50 MG Oral Tab Take 1-2 tablets ( mg total) by mouth every 6 (six) hours as needed  for Pain. (Patient not taking: Reported on 7/30/2024) 40 tablet 0    Ibandronate Sodium 150 MG Oral Tab Take 1 tablet (150 mg total) by mouth every 30 (thirty) days. 3 tablet 3    traZODone 100 MG Oral Tab Take 1.5 tablets (150 mg total) by mouth nightly. 135 tablet 1    pregabalin 100 MG Oral Cap TAKE 1 CAPSULE BY MOUTH ONCE DAILY IN THE EVENING 90 capsule 1       ALLERGIES:   Allergies   Allergen Reactions    Dander ANAPHYLAXIS     Dogs and cats     Iodine (Topical) HIVES and TONGUE SWELLING     Fish iodine     Radiology Contrast Iodinated Dyes ANAPHYLAXIS and HIVES    Skin Adhesives RASH     Skin glue, dermabond          PHYSICAL EXAM:   There were no vitals taken for this visit.    There is no height or weight on file to calculate BMI.      General: No immediate distress   Extremities: No upper extremity edema bilaterally. Peripheral pulses intact. Positive Finkelstein test bilaterally.  Spine:  full and painfree cervical ROM in all directions, diffuse soft tissue tenderness of the cervical and periscapular region bilaterally  Shoulders: full and painfree ROM   Neuro:   Cognition: alert & oriented x 3, attentive, able to follow 2 step commands, comprehention intact, spontaneous speech intact  Strength: Upper extremities have 5/5 strength  Sensation: Normal upper extremities  Reflexes: Normal upper extremities, no hyperreflexia, negative Eugenia's and no clonus  Spurling's sign: neg        Data  EMG July 18, 2024:  Impression:  1.  Normal study  2.  No electrodiagnostic evidence of median neuropathy bilaterally.  3.  No electrodiagnostic evidence of ulnar neuropathy bilaterally.  4.  No electrodiagnostic evidence of cervical radiculopathy bilaterally.    Radiology Imaging:  I reviewed a plain film x-ray of the cervical spine showing intact ACDF hardware between C4 and C6.  There are also pedicle screws at C4 and C5 on the right and C4, C5 and C6 on the left.  I reviewed a cervical MRI showing straightening of  the usual lordosis, subadjacent disc bulging.    ASSESSMENT AND PLAN:  1. De Quervain's tenosynovitis, bilateral  Today we decided to bilateral de Quervain's injections.  She will return in 4 weeks to check for her response.  - lidocaine (Xylocaine) 1 % injection  - triamcinolone acetonide (Kenalog-40) 40 MG/ML injection 80 mg    2. Polyarthralgia  Continue Lyrica from other physicians.    3. S/P cervical spinal fusion  Excellent surgical results.  No neurological deficits on examination.    4. Age-related osteoporosis without current pathological fracture  Avoiding steroids            The patient was in agreement with the assessment and plan.  All questions were answered.        Husam Arreola MD  Physical Medicine and Rehabilitation/Sports Medicine  [facility]

## 2024-08-05 ENCOUNTER — TELEPHONE (OUTPATIENT)
Facility: LOCATION | Age: 64
End: 2024-08-05

## 2024-08-05 NOTE — TELEPHONE ENCOUNTER
The patient recently called to schedule cancer screening with . She use to be a patient of . Messaging to see if  have any earlier appointments for the patient to be seen. Last seen in February in office.     Call back # [phone]

## 2024-08-05 NOTE — TELEPHONE ENCOUNTER
Spoke with patient, and scheduled w/GORDON 8/29/24.    Future Appointments   Date Time Provider Department Center   8/14/2024  1:30 PM [facility]   8/29/2024  2:00 PM Riccardo Lara MD EMGGENSURNAP [id number]   9/9/2024  2:00 PM [facility] MRI RM1 (1.5T WIDE) [facility] MRI [facility]   10/15/2024  1:40 PM Power Mcdaniel DO ENIWOODRIDGE [id number]   10/29/2024  1:00 PM Chaparrita Lindquist MD  HEM ONC [facility]

## 2024-08-14 ENCOUNTER — OFFICE VISIT (OUTPATIENT)
Dept: PHYSICAL MEDICINE AND REHAB | Facility: CLINIC | Age: 64
End: 2024-08-14
Payer: MEDICARE

## 2024-08-14 ENCOUNTER — HOSPITAL ENCOUNTER (OUTPATIENT)
Dept: GENERAL RADIOLOGY | Facility: HOSPITAL | Age: 64
Discharge: HOME | End: 2024-08-14
Attending: PHYSICAL MEDICINE & REHABILITATION
Payer: MEDICARE

## 2024-08-14 VITALS
BODY MASS INDEX: 25 KG/M2 | OXYGEN SATURATION: 98 % | WEIGHT: 147 LBS | DIASTOLIC BLOOD PRESSURE: 72 MMHG | SYSTOLIC BLOOD PRESSURE: 118 MMHG | HEART RATE: 81 BPM

## 2024-08-14 DIAGNOSIS — M79.642 PAIN IN BOTH HANDS: Primary | ICD-10-CM

## 2024-08-14 DIAGNOSIS — M65.4 DE QUERVAIN'S TENOSYNOVITIS, BILATERAL: ICD-10-CM

## 2024-08-14 DIAGNOSIS — M25.50 POLYARTHRALGIA: ICD-10-CM

## 2024-08-14 DIAGNOSIS — Z98.1 S/P CERVICAL SPINAL FUSION: ICD-10-CM

## 2024-08-14 DIAGNOSIS — M81.0 AGE-RELATED OSTEOPOROSIS WITHOUT CURRENT PATHOLOGICAL FRACTURE: ICD-10-CM

## 2024-08-14 DIAGNOSIS — M79.641 PAIN IN BOTH HANDS: Primary | ICD-10-CM

## 2024-08-14 PROCEDURE — 73130 X-RAY EXAM OF HAND: CPT | Performed by: PHYSICAL MEDICINE & REHABILITATION

## 2024-08-14 PROCEDURE — 99214 OFFICE O/P EST MOD 30 MIN: CPT | Performed by: PHYSICAL MEDICINE & REHABILITATION

## 2024-08-14 NOTE — PROGRESS NOTES
[facility]  Progress Note    CHIEF COMPLAINT:    Chief Complaint   Patient presents with    Follow - Up     LOV7/19/2024 Post injections DeQuervain`s tendon sheath injection Left hand  has improved 35% and improvement of  25%. Pain 7/10 . No PT. Continues to do HEP. Pregabalin no improvement.        History of Present Illness:  Hina Martin is a 63 year old female who presents today for follow up for symptoms of bilateral hand pain.  At my last visit with her I performed bilateral de Quervain's injections.  She states symptoms are mildly improved.  She states she still can't manage her groceries due to hand pain.      In addition to the symptoms she has numerous other complaints, including numb legs, insomnia, other physicians have ordered and MRI of the pelvis.  She will be seeing Dr. Mcdaniel soon to workup lower extremity paresthesias.       PAST MEDICAL HISTORY:  Past Medical History:    Anal cancer (HCC)    Calculus of kidney    Exposure to medical diagnostic radiation    1/2021    Lower extremity edema    Osteoporosis    Personal history of antineoplastic chemotherapy    1/2021 completed    Plantar fasciitis    PONV (postoperative nausea and vomiting)    nausea    Rectal cancer (HCC)       SURGICAL HISTORY:  Past Surgical History:   Procedure Laterality Date    Colonoscopy N/A 09/24/2020    Procedure: COLONOSCOPY;  Surgeon: Gareth Eli MD;  Location:  ENDOSCOPY    Colonoscopy N/A 12/09/2021    Procedure: COLONOSCOPY WITH BIOPSY;  Surgeon: Gareth Eli MD;  Location:  ENDOSCOPY    Colonoscopy,biopsy N/A 05/05/2015    Procedure: COLONOSCOPY, POSSIBLE BIOPSY, POSSIBLE POLYPECTOMY 44060;  Surgeon: Jeronimo Borja MD;  [facility] [facility]    Colonoscopy,diagnostic  05/05/2015    2 splenic flexure polyps, diverticulosis    Colonoscopy,remv lesn,snare N/A 05/05/2015    Procedure: COLONOSCOPY, POSSIBLE BIOPSY, POSSIBLE  POLYPECTOMY 82584;  Surgeon: Jeronimo Borja MD;  [facility] [facility]    Hysterectomy  1988    1 ovary removed.    Oophorectomy  1988    one ovary removed.    Other  06/04/2018    CERVICAL 4 - CERVICAL 5, CERVICAL 5 - CERVICAL 6  ANTERIOR CERVICAL DISCECTOMY AND FUSION WITH INSTRUMENTATION, ALLOGRAFT AND  ALL INDICATED PROCEDURES    Other  09/09/2019    RIGHT CARPAL TUNNEL RELEASE     Other surgical history      breast implants    Other surgical history  09/2019    Ulnar transposition, carpal tunnel release right arm    Other surgical history  06/2018    C4-6 fusion    Other surgical history  11/18/2020    Port-A-Cath Placement with fluroscopy, left chest     Port, indwelling, imp      removed s/p chemo       SOCIAL HISTORY:   Social History     Occupational History    Occupation: Works as    Tobacco Use    Smoking status: Never    Smokeless tobacco: Never   Vaping Use    Vaping status: Never Used   Substance and Sexual Activity    Alcohol use: Not Currently    Drug use: No    Sexual activity: Yes     Partners: Male     Birth control/protection: Hysterectomy       CURRENT MEDICATIONS:   Current Outpatient Medications   Medication Sig Dispense Refill    albuterol 108 (90 Base) MCG/ACT Inhalation Aero Soln Inhale 2 puffs into the lungs every 4 (four) hours as needed for Wheezing. 1 each 0    Ibandronate Sodium 150 MG Oral Tab Take 1 tablet (150 mg total) by mouth every 30 (thirty) days. 3 tablet 3    traZODone 100 MG Oral Tab Take 1.5 tablets (150 mg total) by mouth nightly. 135 tablet 1    pregabalin 100 MG Oral Cap TAKE 1 CAPSULE BY MOUTH ONCE DAILY IN THE EVENING 90 capsule 1    MELOXICAM 15 MG Oral Tab Take 1 tablet by mouth once daily (Patient not taking: Reported on 8/14/2024) 30 tablet 0    traMADol 50 MG Oral Tab Take 1-2 tablets ( mg total) by mouth every 6 (six) hours as needed for Pain. (Patient not taking: Reported on 7/30/2024) 40 tablet 0       ALLERGIES:    Allergies   Allergen Reactions    Dander ANAPHYLAXIS     Dogs and cats     Iodine (Topical) HIVES and TONGUE SWELLING     Fish iodine     Radiology Contrast Iodinated Dyes ANAPHYLAXIS and HIVES    Skin Adhesives RASH     Skin glue, dermabond          PHYSICAL EXAM:   /72   Pulse 81   Wt 147 lb (66.7 kg)   SpO2 98%   BMI 25.23 kg/m²     Body mass index is 25.23 kg/m².      General: No immediate distress  Extremities: No upper extremity edema bilaterally   Spine: full and painfree cervical ROM in all directions  Shoulders: full and painfree ROM   Neuro:   Cognition: alert & oriented x 3, attentive, comprehention intact, spontaneous speech intact  Strength:  Upper extremities have 5/5 strength  Sensation: Normal upper extremities  Reflexes: Normal upper extremities  Spurling's sign: neg        Data    Radiology Imaging:  EMG July 18, 2024:  Impression:  1.  Normal study  2.  No electrodiagnostic evidence of median neuropathy bilaterally.  3.  No electrodiagnostic evidence of ulnar neuropathy bilaterally.  4.  No electrodiagnostic evidence of cervical radiculopathy bilaterally.     Radiology Imaging:  I reviewed a plain film x-ray of the cervical spine showing intact ACDF hardware between C4 and C6.  There are also pedicle screws at C4 and C5 on the right and C4, C5 and C6 on the left.  I reviewed a cervical MRI showing straightening of the usual lordosis, subadjacent disc bulging.    ASSESSMENT AND PLAN:  1. Pain in both hands  Unclear etiology, probably multifactorial.  Already on pregabalin for nonspecific soft tissue pain.  Will get hand x-rays and send to OT.  No obvious radiculopathy or obvious focal orthopedic issues of the hands.  RTC 6 weeks.  - Occupational Therapy Referral - External    2. De Quervain's tenosynovitis, bilateral  Examination now normal post injection.  - XR HAND BILAT (MIN 3 VIEWS) (CPT=73130-50); Future  - Occupational Therapy Referral - External    3. Polyarthralgia  Already on  Addy, might be a candidate for Cymbalta  - XR HAND BILAT (MIN 3 VIEWS) (CPT=73130-50); Future    4. S/P cervical spinal fusion  Spinal workup has been unrevealing for causes of arm symptoms.  The cervical spine is radiographically and physiologically unremarkable.    5. Age-related osteoporosis without current pathological fracture  Caution with steroids                The patient was in agreement with the assessment and plan.  All questions were answered.        Husam Arreola MD  Physical Medicine and Rehabilitation/Sports Medicine  [facility]

## 2024-08-21 ENCOUNTER — MED REC SCAN ONLY (OUTPATIENT)
Dept: PHYSICAL MEDICINE AND REHAB | Facility: CLINIC | Age: 64
End: 2024-08-21

## 2024-08-29 ENCOUNTER — OFFICE VISIT (OUTPATIENT)
Facility: LOCATION | Age: 64
End: 2024-08-29
Payer: MEDICARE

## 2024-08-29 VITALS — TEMPERATURE: 98 F | HEART RATE: 73 BPM

## 2024-08-29 DIAGNOSIS — R15.1 FECAL SMEARING: ICD-10-CM

## 2024-08-29 DIAGNOSIS — C21.0 ANAL CANCER (HCC): Primary | ICD-10-CM

## 2024-08-29 PROCEDURE — 99213 OFFICE O/P EST LOW 20 MIN: CPT | Performed by: STUDENT IN AN ORGANIZED HEALTH CARE EDUCATION/TRAINING PROGRAM

## 2024-08-29 PROCEDURE — 46600 DIAGNOSTIC ANOSCOPY SPX: CPT | Performed by: STUDENT IN AN ORGANIZED HEALTH CARE EDUCATION/TRAINING PROGRAM

## 2024-08-29 NOTE — H&P
New Patient Visit Note       Active Problems      1. Anal cancer (HCC)    2. Fecal smearing        Chief Complaint   Chief Complaint   Patient presents with    New Patient     NP- Adenosquamous carcinoma of anus f/u ( seen DJP)- denies new concerns        History of Present Illness   This is a very nice 64-year-old female with history of squamous cell carcinoma of the anal canal previously known to Dr. Eli and Dr. Waldrop.  Patient underwent transanal excision of the lesion on 10/14/2020 followed by chemoradiotherapy completed on 1/8/2021.  She follows up with Dr. Lindquist of medical oncology regularly and surveillance imaging has been negative for recurrence.  Dr. Waldrop performed the patient's most recent colonoscopy on 12/16/2022.  This demonstrated radiation proctitis and diverticulosis.  He recommended a 3-year recall.  Patient presents to clinic to establish care with me.    Patient continues to have some bowel dysfunction.  She states she cannot feel her rectum well.  She feels like her rectum does not empty well.  She has a daily mucus discharge and wears a pad.  She has some episodes of fecal incontinence.  She denies any rectal pain, bleeding or feeling any sort of mass in the area.  She is currently regulating her bowel movements through prebiotics and probiotics.  She has never completed formal pelvic floor physical therapy.  Patient is currently retired and her  unfortunately has had a stroke recently.      Allergies  Hina is allergic to dander, iodine (topical), radiology contrast iodinated dyes, and skin adhesives.    Past Medical / Surgical / Social / Family History    The past medical and past surgical history have been reviewed by me today.    Past Medical History:    Anal cancer (HCC)    Calculus of kidney    Exposure to medical diagnostic radiation    1/2021    Lower extremity edema    Osteoporosis    Personal history of antineoplastic chemotherapy    1/2021 completed    Plantar fasciitis     PONV (postoperative nausea and vomiting)    nausea    Rectal cancer (HCC)     Past Surgical History:   Procedure Laterality Date    Colonoscopy N/A 09/24/2020    Procedure: COLONOSCOPY;  Surgeon: Gareth Eli MD;  Location:  ENDOSCOPY    Colonoscopy N/A 12/09/2021    Procedure: COLONOSCOPY WITH BIOPSY;  Surgeon: Gareth Eli MD;  Location:  ENDOSCOPY    Colonoscopy,biopsy N/A 05/05/2015    Procedure: COLONOSCOPY, POSSIBLE BIOPSY, POSSIBLE POLYPECTOMY 73613;  Surgeon: Jeronimo Borja MD;  Location: [facility]    Colonoscopy,diagnostic  05/05/2015    2 splenic flexure polyps, diverticulosis    Colonoscopy,remv lesn,snare N/A 05/05/2015    Procedure: COLONOSCOPY, POSSIBLE BIOPSY, POSSIBLE POLYPECTOMY 51395;  Surgeon: Jeronimo Borja MD;  Location: [facility]    Hysterectomy  1988    1 ovary removed.    Oophorectomy  1988    one ovary removed.    Other  06/04/2018    CERVICAL 4 - CERVICAL 5, CERVICAL 5 - CERVICAL 6  ANTERIOR CERVICAL DISCECTOMY AND FUSION WITH INSTRUMENTATION, ALLOGRAFT AND  ALL INDICATED PROCEDURES    Other  09/09/2019    RIGHT CARPAL TUNNEL RELEASE     Other surgical history      breast implants    Other surgical history  09/2019    Ulnar transposition, carpal tunnel release right arm    Other surgical history  06/2018    C4-6 fusion    Other surgical history  11/18/2020    Port-A-Cath Placement with fluroscopy, left chest     Port, indwelling, imp      removed s/p chemo       The family history and social history have been reviewed by me today.    Family History   Problem Relation Age of Onset    Breast Cancer Mother 48    Other (osteoporosis) Mother     Heart Disorder Father 43        CABG x 5, had stents later in life    Diabetes Father     Cancer Paternal Grandmother         breast cancer, colon cancer    Diabetes Paternal Grandmother     Breast Cancer Paternal Grandmother 50        50's?    Heart Disorder Paternal Grandfather           from AMI    Heart Disorder Maternal Aunt     Breast Cancer Maternal Aunt 70        70's?    Heart Disorder Maternal Aunt     Breast Cancer Maternal Aunt 70        70's?    Heart Disorder Maternal Aunt     Breast Cancer Maternal Aunt 60        60's    Breast Cancer Maternal Aunt 80        80?    Heart Disorder Paternal Uncle 30         from AMI    Heart Disorder Paternal Uncle 45         from AMI    Cancer Paternal Uncle 48         from lung cancer    Heart Disorder Paternal Uncle 38        CABG    Heart Disorder Paternal Uncle 46         from CAD    Diabetes Paternal Uncle     Heart Disorder Paternal Uncle 60         from CAD    Diabetes Paternal Uncle     Ovarian Cancer Paternal Cousin Female 13        passed away age 14     Social History     Socioeconomic History    Marital status:     Number of children: 1   Occupational History    Occupation: Works as    Tobacco Use    Smoking status: Never    Smokeless tobacco: Never   Vaping Use    Vaping status: Never Used   Substance and Sexual Activity    Alcohol use: Not Currently    Drug use: No    Sexual activity: Yes     Partners: Male     Birth control/protection: Hysterectomy   Other Topics Concern    Caffeine Concern Yes     Comment: 1-3 coffee per daily    Exercise Yes     Comment: occ- ON HOLD        Current Outpatient Medications:     albuterol 108 (90 Base) MCG/ACT Inhalation Aero Soln, Inhale 2 puffs into the lungs every 4 (four) hours as needed for Wheezing., Disp: 1 each, Rfl: 0    MELOXICAM 15 MG Oral Tab, Take 1 tablet by mouth once daily (Patient not taking: Reported on 2024), Disp: 30 tablet, Rfl: 0    traMADol 50 MG Oral Tab, Take 1-2 tablets ( mg total) by mouth every 6 (six) hours as needed for Pain. (Patient not taking: Reported on 2024), Disp: 40 tablet, Rfl: 0    Ibandronate Sodium 150 MG Oral Tab, Take 1 tablet (150 mg total) by mouth every 30 (thirty) days., Disp: 3 tablet, Rfl: 3     traZODone 100 MG Oral Tab, Take 1.5 tablets (150 mg total) by mouth nightly., Disp: 135 tablet, Rfl: 1    pregabalin 100 MG Oral Cap, TAKE 1 CAPSULE BY MOUTH ONCE DAILY IN THE EVENING, Disp: 90 capsule, Rfl: 1      Review of Systems  A 10 point review of systems was performed and negative unless otherwise documented per HPI.    Physical Findings   Pulse 73   Temp 97.5 °F (36.4 °C) (Temporal)   Physical Exam  Vitals and nursing note reviewed. Exam conducted with a chaperone present.   Constitutional:       General: She is not in acute distress.  HENT:      Head: Normocephalic and atraumatic.      Mouth/Throat:      Mouth: Mucous membranes are moist.   Cardiovascular:      Rate and Rhythm: Normal rate and regular rhythm.   Pulmonary:      Effort: Pulmonary effort is normal.   Abdominal:      General: There is no distension.      Palpations: Abdomen is soft.      Tenderness: There is no abdominal tenderness.   Genitourinary:     Comments: Patient examined in the prone jackknife position with female medical assistant chaperone present.  External exam of the anus is normal.  Digital rectal exam shows fair tone with a flat scar palpable along the right side of the anal canal.  There is no mass, bleeding, drainage or tenderness.  Anoscopy reveals a flat, white scar along the right side of the anal canal.  No inguinal lymphadenopathy.  Musculoskeletal:         General: No deformity.   Skin:     General: Skin is warm and dry.   Neurological:      General: No focal deficit present.      Mental Status: She is alert.   Psychiatric:         Mood and Affect: Mood normal.             Assessment   1. Anal cancer (HCC)    2. Fecal smearing        This is a very nice 64-year-old female with history of squamous cell carcinoma of the anal canal previously known to Dr. Eli and Dr. Waldrop.  Patient underwent transanal excision of the lesion on 10/14/2020 followed by chemoradiotherapy completed on 1/8/2021.  She follows up with   Lexa of medical oncology regularly and surveillance imaging has been negative for recurrence.  Dr. Waldrop performed the patient's most recent colonoscopy on 12/16/2022.  This demonstrated radiation proctitis and diverticulosis.  He recommended a 3-year recall.  Patient presents to clinic to establish care with me.    Patient continues to have some bowel dysfunction.  She states she cannot feel her rectum well.  She feels like her rectum does not empty well.  She has a daily mucus discharge and wears a pad.  She has some episodes of fecal incontinence.  She denies any rectal pain, bleeding or feeling any sort of mass in the area.  She is currently regulating her bowel movements through prebiotics and probiotics.  She has never completed formal pelvic floor physical therapy.  Patient is currently retired and her  unfortunately has had a stroke recently.    External exam of the anus is normal.  Digital rectal exam shows fair tone with a flat scar palpable along the right side of the anal canal.  There is no mass, bleeding, drainage or tenderness.  Anoscopy reveals a flat, white scar along the right side of the anal canal.  No inguinal lymphadenopathy.    Plan  I reassured the patient there are no signs of local disease recurrence based on bedside exam findings today with a anoscopy.  I recommend considering pelvic floor physical therapy to see if this can help manage her bowel dysfunction.  Patient was interested in trying pelvic floor physical therapy.  I placed this referral order in and asked the patient to call their office to schedule a consultation with them.      I would like see the patient back in 6 months time for repeat examination including digital rectal exam, anoscopy and lymph node exam.  Patient should see me sooner with any new/worsening anorectal symptoms.  Patient expressed understanding was agreeable to plan.     No orders of the defined types were placed in this encounter.      Imaging &  Referrals   PELVIC FLOOR THERAPY - INTERNAL    Follow Up  No follow-ups on file.    Riccardo Lara MD

## 2024-08-29 NOTE — PROCEDURES
Procedure: Anoscopy    Surgeon: Riccardo Lara    Anesthesia: None    Findings: See the progress note attached for all findings    Operative Summary: The patient was placed in a prone position on the proctoscopy table, the hips were flexed in the jackknife position.    Using a well-lubricated finger, digital rectal exam was performed in anticipation of the anoscope.    The anoscope was then inserted under direct visualization.    Excellent visualization was performed in a 360° fashion.    The scope was removed. The patient was taken out of the prone, jackknife, position.     The patient tolerated the procedure well.

## 2024-09-09 ENCOUNTER — TELEPHONE (OUTPATIENT)
Dept: HEMATOLOGY/ONCOLOGY | Facility: HOSPITAL | Age: 64
End: 2024-09-09

## 2024-09-09 ENCOUNTER — HOSPITAL ENCOUNTER (OUTPATIENT)
Dept: MRI IMAGING | Age: 64
Discharge: HOME | End: 2024-09-09
Attending: INTERNAL MEDICINE
Payer: MEDICARE

## 2024-09-09 DIAGNOSIS — C21.0 ANAL CANCER (HCC): Primary | ICD-10-CM

## 2024-09-09 DIAGNOSIS — C21.0 ANAL CANCER (HCC): ICD-10-CM

## 2024-09-09 PROCEDURE — 72158 MRI LUMBAR SPINE W/O & W/DYE: CPT | Performed by: INTERNAL MEDICINE

## 2024-09-09 PROCEDURE — A9575 INJ GADOTERATE MEGLUMI 0.1ML: HCPCS | Performed by: INTERNAL MEDICINE

## 2024-09-09 RX ORDER — GADOTERATE MEGLUMINE 376.9 MG/ML
15 INJECTION INTRAVENOUS
Status: COMPLETED | OUTPATIENT
Start: 2024-09-09 | End: 2024-09-09

## 2024-09-09 RX ADMIN — GADOTERATE MEGLUMINE 14 ML: 376.9 INJECTION INTRAVENOUS at 14:19:00

## 2024-09-09 NOTE — TELEPHONE ENCOUNTER
Amber, MRI Lead at [facility], called stated she needs clarification on orders before the pt's appt today 9/9/24 at 2pm    Please call Amber back at [phone]    Attempted to reach nurse

## 2024-09-10 DIAGNOSIS — C21.0 ANAL CANCER (HCC): Primary | ICD-10-CM

## 2024-09-10 RX ORDER — HYDROCODONE BITARTRATE AND ACETAMINOPHEN 5; 325 MG/1; MG/1
1-2 TABLET ORAL EVERY 4 HOURS PRN
Qty: 90 TABLET | Refills: 0 | Status: SHIPPED | OUTPATIENT
Start: 2024-09-10 | End: 2024-09-10

## 2024-09-26 DIAGNOSIS — C21.0 ANAL CANCER (HCC): ICD-10-CM

## 2024-09-26 DIAGNOSIS — G47.00 INSOMNIA, UNSPECIFIED TYPE: ICD-10-CM

## 2024-09-26 NOTE — TELEPHONE ENCOUNTER
LOV:07/02/2024      NOV:not scheduled     Medication:     Trazodone 100 mg 1.5 tabs nightly Norco 5- 325 mg 1-2 tabs every 4 hours as needed

## 2024-09-26 NOTE — TELEPHONE ENCOUNTER
HYDROcodone-acetaminophen 5-325 MG Oral Tab 90 tablet       traZODone 100 MG Oral Tab 135 tablet 1 3/19/2024 --     [address] ([address]), [address] [phone], [phone]     Please advise, needs refill.

## 2024-09-28 RX ORDER — TRAZODONE HYDROCHLORIDE 100 MG/1
150 TABLET ORAL NIGHTLY
Qty: 135 TABLET | Refills: 0 | Status: SHIPPED | OUTPATIENT
Start: 2024-09-28

## 2024-09-30 RX ORDER — HYDROCODONE BITARTRATE AND ACETAMINOPHEN 5; 325 MG/1; MG/1
1-2 TABLET ORAL EVERY 4 HOURS PRN
Qty: 90 TABLET | Refills: 0 | Status: SHIPPED | OUTPATIENT
Start: 2024-09-30

## 2024-10-07 DIAGNOSIS — M54.2 NECK PAIN: ICD-10-CM

## 2024-10-07 NOTE — TELEPHONE ENCOUNTER
Last Office Visit: 03/19/2024    Last Refill:   Medication Quantity Refills Start End   pregabalin 100 MG Oral Cap 90 capsule 1 3/19/2024 --     Return to Clinic: 09/19/2024    Protocol: n/a    Please call patient to schedule medication follow up then route to new pcp

## 2024-10-11 RX ORDER — PREGABALIN 100 MG/1
CAPSULE ORAL
Qty: 90 CAPSULE | Refills: 0 | Status: SHIPPED | OUTPATIENT
Start: 2024-10-11

## 2024-10-15 ENCOUNTER — OFFICE VISIT (OUTPATIENT)
Dept: NEUROLOGY | Facility: CLINIC | Age: 64
End: 2024-10-15
Payer: MEDICARE

## 2024-10-15 ENCOUNTER — TELEPHONE (OUTPATIENT)
Dept: NEUROLOGY | Facility: CLINIC | Age: 64
End: 2024-10-15

## 2024-10-15 ENCOUNTER — LAB ENCOUNTER (OUTPATIENT)
Dept: LAB | Age: 64
End: 2024-10-15
Attending: Other
Payer: MEDICARE

## 2024-10-15 VITALS
SYSTOLIC BLOOD PRESSURE: 142 MMHG | HEART RATE: 91 BPM | RESPIRATION RATE: 17 BRPM | BODY MASS INDEX: 26 KG/M2 | WEIGHT: 149.5 LBS | DIASTOLIC BLOOD PRESSURE: 73 MMHG

## 2024-10-15 DIAGNOSIS — D72.810 LYMPHOCYTES DECREASED: ICD-10-CM

## 2024-10-15 DIAGNOSIS — M79.605 PAIN IN BOTH LOWER EXTREMITIES: ICD-10-CM

## 2024-10-15 DIAGNOSIS — M79.604 PAIN IN BOTH LOWER EXTREMITIES: ICD-10-CM

## 2024-10-15 DIAGNOSIS — M79.604 PAIN IN BOTH LOWER EXTREMITIES: Primary | ICD-10-CM

## 2024-10-15 DIAGNOSIS — M79.605 PAIN IN BOTH LOWER EXTREMITIES: Primary | ICD-10-CM

## 2024-10-15 LAB
BASOPHILS # BLD AUTO: 0.05 X10(3) UL (ref 0–0.2)
BASOPHILS NFR BLD AUTO: 1 %
CK SERPL-CCNC: 95 U/L
EOSINOPHIL # BLD AUTO: 0.12 X10(3) UL (ref 0–0.7)
EOSINOPHIL NFR BLD AUTO: 2.3 %
ERYTHROCYTE [DISTWIDTH] IN BLOOD BY AUTOMATED COUNT: 13.1 %
ERYTHROCYTE [SEDIMENTATION RATE] IN BLOOD: 8 MM/HR
HCT VFR BLD AUTO: 37.8 %
HGB BLD-MCNC: 13 G/DL
IMM GRANULOCYTES # BLD AUTO: 0.01 X10(3) UL (ref 0–1)
IMM GRANULOCYTES NFR BLD: 0.2 %
LYMPHOCYTES # BLD AUTO: 1.31 X10(3) UL (ref 1–4)
LYMPHOCYTES NFR BLD AUTO: 25.2 %
MCH RBC QN AUTO: 32.7 PG (ref 26–34)
MCHC RBC AUTO-ENTMCNC: 34.4 G/DL (ref 31–37)
MCV RBC AUTO: 95.2 FL
MONOCYTES # BLD AUTO: 0.24 X10(3) UL (ref 0.1–1)
MONOCYTES NFR BLD AUTO: 4.6 %
NEUTROPHILS # BLD AUTO: 3.47 X10 (3) UL (ref 1.5–7.7)
NEUTROPHILS # BLD AUTO: 3.47 X10(3) UL (ref 1.5–7.7)
NEUTROPHILS NFR BLD AUTO: 66.7 %
PLATELET # BLD AUTO: 218 10(3)UL (ref 150–450)
RBC # BLD AUTO: 3.97 X10(6)UL
WBC # BLD AUTO: 5.2 X10(3) UL (ref 4–11)

## 2024-10-15 PROCEDURE — 85652 RBC SED RATE AUTOMATED: CPT

## 2024-10-15 PROCEDURE — 82550 ASSAY OF CK (CPK): CPT

## 2024-10-15 PROCEDURE — 99204 OFFICE O/P NEW MOD 45 MIN: CPT | Performed by: OTHER

## 2024-10-15 PROCEDURE — 36415 COLL VENOUS BLD VENIPUNCTURE: CPT

## 2024-10-15 PROCEDURE — 85025 COMPLETE CBC W/AUTO DIFF WBC: CPT

## 2024-10-15 RX ORDER — NORTRIPTYLINE HYDROCHLORIDE 25 MG/1
25 CAPSULE ORAL NIGHTLY
Qty: 30 CAPSULE | Refills: 2 | Status: SHIPPED | OUTPATIENT
Start: 2024-10-15

## 2024-10-15 NOTE — PATIENT INSTRUCTIONS
After your visit at the [facility] today,  please direct any follow up questions or medication needs to the staff in our [place] office so that your concerns may be promptly addressed.  We are available through [employer] or at the numbers below:    The phone number is:   [phone] option #1    The fax number is:  [phone]    Your pharmacy should also send any requests electronically to the [place] office.  Refill policies:    Allow 2-3 business days for refills; controlled substances may take longer.  Contact your pharmacy at least 5 days prior to running out of medication and have them send an electronic request or submit request through the “request refill” option in your [employer] account.  Refills are not addressed on weekends; covering physicians do not authorize routine medications on weekends.  No narcotics or controlled substances are refilled after noon on Fridays or by on call physicians.  By law, narcotics must be electronically prescribed.  A 30 day supply with no refills is the maximum allowed.  If your prescription is due for a refill, you may be due for a follow up appointment.  To best provide you care, patients receiving routine medications need to be seen at least once a year.  Patients receiving narcotic/controlled substance medications need to be seen at least once every 3 months.  In the event that your preferred pharmacy does not have the requested medication in stock (e.g. Backordered), it is your responsibility to find another pharmacy that has the requested medication available.  We will gladly send a new prescription to that pharmacy at your request.    Scheduling Tests:    If your physician has ordered radiology tests such as MRI or CT scans, please contact Central Scheduling at [phone] right away to schedule the test.  Once scheduled, the [facility] Centralized Referral Team will work with your insurance carrier to obtain pre-certification or prior authorization.   Depending on your insurance carrier, approval may take 3-10 days.  It is highly recommended patients assure they have received an authorization before having a test performed.  If test is done without insurance authorization, patient may be responsible for the entire amount billed.      Precertification and Prior Authorizations:  If your physician has recommended that you have a procedure or additional testing performed the [facility] Centralized Referral Team will contact your insurance carrier to obtain pre-certification or prior authorization.    You are strongly encouraged to contact your insurance carrier to verify that your procedure/test has been approved and is a COVERED benefit.  Although the [facility] Centralized Referral Team does its due diligence, the insurance carrier gives the disclaimer that \"Although the procedure is authorized, this does not guarantee payment.\"    Ultimately the patient is responsible for payment.   Thank you for your understanding in this matter.  Paperwork Completion:  If you require FMLA or disability paperwork for your recovery, please make sure to either drop it off or have it faxed to our office at [phone]. Be sure the form has your name and date of birth on it.  The form will be faxed to our Forms Department and they will complete it for you.  There is a 25$ fee for all forms that need to be filled out.  Please be aware there is a 10-14 day turnaround time.  You will need to sign a release of information (MOISES) form if your paperwork does not come with one.  You may call the Forms Department with any questions at [phone].  Their fax number is [phone].

## 2024-10-15 NOTE — TELEPHONE ENCOUNTER
Pt brought [place] Parking Placard Form to OV today.  Completed and signed.  Original given to patient and copy sent to scanning.

## 2024-10-15 NOTE — H&P
Neurology H&P    Hina Mratin Patient Status:  No patient class for patient encounter    1960 MRN [id number]   Location [facility], [address] [place] [address] Attending No att. providers found   Hosp Day # 0 PCP Terri Johnson MD     Subjective:  Hina Martin is a(n) 64 year old female past medical history of OA, cervical spine stenosis status post fusion, and anal cancer.  She comes the neurology clinic for evaluation of leg pains. She states that she also has pain in her hands. She has pain in her legs mostly at night. She states that she cannot get through a night without pain in her legs. She states that if she sits for too long on a hard chair she gets pain in the legs as well. She states that it feels like someone is taking the insides of her legs and stretching. Most of the time this is from the knees down to the foot. She uses a cane due to the pain that she has when getting in or out of a car. She does endorse pain that radiates form the back and into the legs. She denies any numbness or tingling the legs. She feels that her legs are weak especially when standing form a sitting position and feels like her legs may give out.     Current Medications:  Current Outpatient Medications   Medication Sig Dispense Refill    pregabalin 100 MG Oral Cap TAKE 1 CAPSULE BY MOUTH ONCE DAILY IN THE EVENING 90 capsule 0    HYDROcodone-acetaminophen 5-325 MG Oral Tab Take 1-2 tablets by mouth every 4 (four) hours as needed for Pain. 90 tablet 0    traZODone 100 MG Oral Tab Take 1.5 tablets (150 mg total) by mouth nightly. 135 tablet 0    albuterol 108 (90 Base) MCG/ACT Inhalation Aero Soln Inhale 2 puffs into the lungs every 4 (four) hours as needed for Wheezing. 1 each 0    MELOXICAM 15 MG Oral Tab Take 1 tablet by mouth once daily (Patient not taking: Reported on 2024) 30 tablet 0    traMADol 50 MG Oral Tab Take 1-2 tablets ( mg total) by mouth every 6 (six) hours as needed for  Pain. (Patient not taking: Reported on 7/30/2024) 40 tablet 0    Ibandronate Sodium 150 MG Oral Tab Take 1 tablet (150 mg total) by mouth every 30 (thirty) days. 3 tablet 3       Problem List:  Patient Active Problem List   Diagnosis    Vitamin D deficiency    Insomnia, unspecified    Osteoporosis    Cervical disc disorder at C5-C6 level with radiculopathy    Ulnar nerve entrapment at elbow    Microscopic hematuria    S/P cervical spinal fusion    Adenosquamous carcinoma of anus (HCC)    Adenomatous polyp of colon    Nonrheumatic mitral valve regurgitation    Anal sphincter incontinence    Bone cyst of foot    History of anal cancer    De Quervain's tenosynovitis, bilateral    Polyarthralgia       PMHx:  Past Medical History:    Anal cancer (HCC)    Calculus of kidney    Exposure to medical diagnostic radiation    1/2021    Lower extremity edema    Osteoporosis    Personal history of antineoplastic chemotherapy    1/2021 completed    Plantar fasciitis    PONV (postoperative nausea and vomiting)    nausea    Rectal cancer (HCC)       PSHx:  Past Surgical History:   Procedure Laterality Date    Colonoscopy N/A 09/24/2020    Procedure: COLONOSCOPY;  Surgeon: Gareth Eli MD;  Location:  ENDOSCOPY    Colonoscopy N/A 12/09/2021    Procedure: COLONOSCOPY WITH BIOPSY;  Surgeon: Gareth Eli MD;  Location:  ENDOSCOPY    Colonoscopy,biopsy N/A 05/05/2015    Procedure: COLONOSCOPY, POSSIBLE BIOPSY, POSSIBLE POLYPECTOMY 27074;  Surgeon: Jeronimo Borja MD;  Location: [facility]    Colonoscopy,diagnostic  05/05/2015    2 splenic flexure polyps, diverticulosis    Colonoscopy,remv lesn,snare N/A 05/05/2015    Procedure: COLONOSCOPY, POSSIBLE BIOPSY, POSSIBLE POLYPECTOMY 71235;  Surgeon: Jeronimo Borja MD;  Location: [facility]    Hysterectomy  1988    1 ovary removed.    Oophorectomy  1988    one ovary removed.    Other  06/04/2018    CERVICAL 4 - CERVICAL 5, CERVICAL 5  - CERVICAL 6  ANTERIOR CERVICAL DISCECTOMY AND FUSION WITH INSTRUMENTATION, ALLOGRAFT AND  ALL INDICATED PROCEDURES    Other  2019    RIGHT CARPAL TUNNEL RELEASE     Other surgical history      breast implants    Other surgical history  2019    Ulnar transposition, carpal tunnel release right arm    Other surgical history  2018    C4-6 fusion    Other surgical history  2020    Port-A-Cath Placement with fluroscopy, left chest     Port, indwelling, imp      removed s/p chemo       SocHx:  Social History     Socioeconomic History    Marital status:     Number of children: 1   Occupational History    Occupation: Works as    Tobacco Use    Smoking status: Never    Smokeless tobacco: Never   Vaping Use    Vaping status: Never Used   Substance and Sexual Activity    Alcohol use: Not Currently    Drug use: No    Sexual activity: Yes     Partners: Male     Birth control/protection: Hysterectomy   Other Topics Concern    Caffeine Concern Yes     Comment: 1-3 coffee per daily    Exercise Yes     Comment: occ- ON HOLD   Social History Narrative    , lives at home with     Has 1 daughter       Family History:  Family History   Problem Relation Age of Onset    Breast Cancer Mother 48    Other (osteoporosis) Mother     Heart Disorder Father 43        CABG x 5, had stents later in life    Diabetes Father     Cancer Paternal Grandmother         breast cancer, colon cancer    Diabetes Paternal Grandmother     Breast Cancer Paternal Grandmother 50        50's?    Heart Disorder Paternal Grandfather          from AMI    Heart Disorder Maternal Aunt     Breast Cancer Maternal Aunt 70        70's?    Heart Disorder Maternal Aunt     Breast Cancer Maternal Aunt 70        70's?    Heart Disorder Maternal Aunt     Breast Cancer Maternal Aunt 60        60's    Breast Cancer Maternal Aunt 80        80?    Heart Disorder Paternal Uncle 30         from AMI    Heart Disorder  Paternal Uncle 45         from AMI    Cancer Paternal Uncle 48         from lung cancer    Heart Disorder Paternal Uncle 38        CABG    Heart Disorder Paternal Uncle 46         from CAD    Diabetes Paternal Uncle     Heart Disorder Paternal Uncle 60         from CAD    Diabetes Paternal Uncle     Ovarian Cancer Paternal Cousin Female 13        passed away age 14           ROS:  10 point ROS completed and was negative, except for pertinent positive and negatives stated in subjective.    Objective/Physical Exam:    Vital Signs:  There were no vitals taken for this visit.    Gen: Awake and in no apparent distress  HEENT: moist mucus membranes  Neck: Supple  Cardiovascular: Regular rate and rhythm, no murmur  Pulm: CTAB  GI: non-tender, normal bowel sounds  Skin: normal, dry  Extremities: No clubbing or cyanosis      Neurologic:   MENTAL STATUS: alert, ox3, normal attention, language and fund of knowledge.      CRANIAL NERVES II to XII: PERRLA, no ptosis or diplopia, EOM intact, facial sensation intact, strong eye closure, face is symmetric, no dysarthria, tongue midline,  no tongue fasciculations or atrophy, strong shoulder shrug.    MOTOR EXAMINATION: normal tone, no fasciculations, normal strength throughout in UEs and LEs      SENSORY EXAMINATION:  UE: intact to light touch, pinprick intact but reports more sensitive on the R hand  LE: intact to light touch, pinprick intact    COORDINATION:  No dysmetria, or intention tremor     REFLEXES: 2+ at biceps, 2+ brachioradialis, 2+ at patella, 2+ at the ankles     GAIT: normal stance, normal toe gait and heel gait, tandem well.    Romberg's: negative        Labs:       Imaging:  MRI L spine 24  CONCLUSION:    1. Edema with enhancement noted within the left sacral ala suggestive of nondisplaced insufficiency fracture.   2.  Mild degenerative changes as described above     Assessment:  This is a 65 y/o female with reports of pain in her legs.  This  occurs mostly at night.  She also does report some radiating low back pain.  Her exam is normal and nonfocal.  She is using a cane but is able to walk unassisted.  She has a normal toe and heel gait.  No noted weakness at all on my exam.  She states this pain can wake her from sleep.  Describes it as stretching or pulling sensation.  I can order an EMG of the bilateral lower extremities to evaluate for any signs of neuropathy or radiculopathy or myopathy.  MRI of the L-spine was reviewed and no specific lesions that I can see that would be causing any of these pains. It would be atypical for this to be myopathic I etiology as her pain mostly only happens at night, and she has no weakness. Also denies any pain during the day time  I will order a CPK and sedimentation rate.  She also reports that she sleeps horribly.  She does take trazodone but this apparently does not work very well.  I can try nortriptyline 25 mg nightly.  I asked that she do does not continue trazodone at this time while starting nortriptyline.  The nortriptyline may help with any neuropathic pain though again I am unclear if this has neurologic etiology, and may also help her sleep.      Plan:  LE pain  - Unclear etiology  - CPK, SED  - B12 and TSH are WNL  - EMG/NCS  - MRI L spine reviewed      Power Mcdaniel,   Neurology

## 2024-10-15 NOTE — PROGRESS NOTES
Pt reports pain bilaterally in legs that wakes her up at night.  Pt states pain can also be present if she sits too long.   Pt states pain has been present since March 2024.       Pt reports she feels like her tendon is being pulled in different direction, will wake patient up from a \"dead sleep\".  Generally occurs from knee down to foot.

## 2024-10-29 ENCOUNTER — TELEPHONE (OUTPATIENT)
Dept: FAMILY MEDICINE CLINIC | Facility: CLINIC | Age: 64
End: 2024-10-29

## 2024-10-29 ENCOUNTER — OFFICE VISIT (OUTPATIENT)
Dept: HEMATOLOGY/ONCOLOGY | Facility: HOSPITAL | Age: 64
End: 2024-10-29
Attending: INTERNAL MEDICINE
Payer: MEDICARE

## 2024-10-29 DIAGNOSIS — C21.0 ANAL CANCER (HCC): ICD-10-CM

## 2024-10-29 NOTE — TELEPHONE ENCOUNTER
Spoke to pharmacy. They are getting prescription for pregabalin ready without issues.     Pharmacy states they spoke with patient and informed her already.

## 2024-10-29 NOTE — TELEPHONE ENCOUNTER
Patient states her prescription for pregabalin was not received by pharmacy and is requesting it to be sent again.

## 2024-10-30 ENCOUNTER — MED REC SCAN ONLY (OUTPATIENT)
Dept: PHYSICAL MEDICINE AND REHAB | Facility: CLINIC | Age: 64
End: 2024-10-30

## 2024-11-18 ENCOUNTER — OFFICE VISIT (OUTPATIENT)
Dept: FAMILY MEDICINE CLINIC | Facility: CLINIC | Age: 64
End: 2024-11-18
Payer: MEDICARE

## 2024-11-18 VITALS
HEIGHT: 64.27 IN | SYSTOLIC BLOOD PRESSURE: 110 MMHG | TEMPERATURE: 97 F | WEIGHT: 150.19 LBS | BODY MASS INDEX: 25.64 KG/M2 | DIASTOLIC BLOOD PRESSURE: 58 MMHG | HEART RATE: 72 BPM | RESPIRATION RATE: 16 BRPM

## 2024-11-18 DIAGNOSIS — G89.29 CHRONIC BILATERAL LOW BACK PAIN WITHOUT SCIATICA: Primary | ICD-10-CM

## 2024-11-18 DIAGNOSIS — M54.50 CHRONIC BILATERAL LOW BACK PAIN WITHOUT SCIATICA: Primary | ICD-10-CM

## 2024-11-18 DIAGNOSIS — M81.0 OSTEOPOROSIS, UNSPECIFIED OSTEOPOROSIS TYPE, UNSPECIFIED PATHOLOGICAL FRACTURE PRESENCE: ICD-10-CM

## 2024-11-18 DIAGNOSIS — Z99.89 AMBULATES WITH CANE: ICD-10-CM

## 2024-11-18 DIAGNOSIS — M79.605 PAIN IN BOTH LOWER EXTREMITIES: ICD-10-CM

## 2024-11-18 DIAGNOSIS — G47.00 INSOMNIA, UNSPECIFIED TYPE: ICD-10-CM

## 2024-11-18 DIAGNOSIS — M79.604 PAIN IN BOTH LOWER EXTREMITIES: ICD-10-CM

## 2024-11-18 DIAGNOSIS — Z02.89 ENCOUNTER FOR COMPLETION OF FORM WITH PATIENT: ICD-10-CM

## 2024-11-18 PROCEDURE — 99214 OFFICE O/P EST MOD 30 MIN: CPT | Performed by: STUDENT IN AN ORGANIZED HEALTH CARE EDUCATION/TRAINING PROGRAM

## 2024-11-18 RX ORDER — TRAZODONE HYDROCHLORIDE 150 MG/1
150 TABLET ORAL NIGHTLY
COMMUNITY

## 2024-11-18 NOTE — PROGRESS NOTES
[facility] Note  11/18/24    Chief Complaint   Patient presents with    Medication Follow-Up     HPI:   Hina Martin is a 64 year old female who presents for medication follow up.    Saw neurology, looking into nighttime leg pain. Had been on trazodone for a long time for sleep. Sh was waking up nightly, will haveback pain due to osteoporosis. Tried the nortripline for 8 days. Then went back to trazodone 150mg nightly. Five to six nights a week will wake up often due to the pain, 4-5 times a night. Will have to walk to feel better.     Has been difficult since going through chemotherapy.    Doing EMG on Thursday.     Patient has chronic low back pain.  Doing occupational therapy. If she gets up too quickly one leg will give out.     Taking pregabalin 100mg at bedtime. Take pain down a notch.    Taking boniva once a month. Stopped reclast due to cancer. Started boniva 11/2021. Will get some heartburn. Takes with big glass of water.    Patient was feeling out of breath going up the stairs. Has not needed it. Has been a bit slower lately. Bedroom on first floor. Has not noticed shortness of breath anymore.     Pulled lower back again.    Has high pain tolerance.    Wt Readings from Last 6 Encounters:   11/18/24 150 lb 3.2 oz (68.1 kg)   10/15/24 149 lb 7.6 oz (67.8 kg)   08/14/24 147 lb (66.7 kg)   07/30/24 150 lb (68 kg)   07/02/24 153 lb (69.4 kg)   05/09/24 155 lb (70.3 kg)       Past Medical History:    Anal cancer (HCC)    Calculus of kidney    Exposure to medical diagnostic radiation    1/2021    Lower extremity edema    Osteoporosis    Personal history of antineoplastic chemotherapy    1/2021 completed    Plantar fasciitis    PONV (postoperative nausea and vomiting)    nausea    Rectal cancer (HCC)    Thumb tendonitis     Past Surgical History:   Procedure Laterality Date    Colonoscopy N/A 09/24/2020    Procedure: COLONOSCOPY;  Surgeon: Gareth Eli MD;  Location:   ENDOSCOPY    Colonoscopy N/A 12/09/2021    Procedure: COLONOSCOPY WITH BIOPSY;  Surgeon: Gareth Eli MD;  Location:  ENDOSCOPY    Colonoscopy,biopsy N/A 05/05/2015    Procedure: COLONOSCOPY, POSSIBLE BIOPSY, POSSIBLE POLYPECTOMY 48661;  Surgeon: Jeronimo Borja MD;  Location: [facility]    Colonoscopy,diagnostic  05/05/2015    2 splenic flexure polyps, diverticulosis    Colonoscopy,remv lesn,snare N/A 05/05/2015    Procedure: COLONOSCOPY, POSSIBLE BIOPSY, POSSIBLE POLYPECTOMY 90338;  Surgeon: Jeroinmo Borja MD;  Location: [facility]    Hysterectomy  1988    1 ovary removed.    Oophorectomy  1988    one ovary removed.    Other  06/04/2018    CERVICAL 4 - CERVICAL 5, CERVICAL 5 - CERVICAL 6  ANTERIOR CERVICAL DISCECTOMY AND FUSION WITH INSTRUMENTATION, ALLOGRAFT AND  ALL INDICATED PROCEDURES    Other  09/09/2019    RIGHT CARPAL TUNNEL RELEASE     Other surgical history      breast implants    Other surgical history  09/2019    Ulnar transposition, carpal tunnel release right arm    Other surgical history  06/2018    C4-6 fusion    Other surgical history  11/18/2020    Port-A-Cath Placement with fluroscopy, left chest     Port, indwelling, imp      removed s/p chemo     Allergies[1]   traZODone 150 MG Oral Tab Take 1 tablet (150 mg total) by mouth nightly.      pregabalin 100 MG Oral Cap TAKE 1 CAPSULE BY MOUTH ONCE DAILY IN THE EVENING 90 capsule 0    HYDROcodone-acetaminophen 5-325 MG Oral Tab Take 1-2 tablets by mouth every 4 (four) hours as needed for Pain. 90 tablet 0    albuterol 108 (90 Base) MCG/ACT Inhalation Aero Soln Inhale 2 puffs into the lungs every 4 (four) hours as needed for Wheezing. 1 each 0    Ibandronate Sodium 150 MG Oral Tab Take 1 tablet (150 mg total) by mouth every 30 (thirty) days. 3 tablet 3     Social History     Socioeconomic History    Marital status:     Number of children: 1   Occupational History    Occupation: Works as     Tobacco Use    Smoking status: Never    Smokeless tobacco: Never   Vaping Use    Vaping status: Never Used   Substance and Sexual Activity    Alcohol use: Not Currently    Drug use: No    Sexual activity: Yes     Partners: Male     Birth control/protection: Hysterectomy   Other Topics Concern    Caffeine Concern Yes     Comment: 1-3 coffee per daily    Exercise Yes     Comment: as much as possible, when not injured walking 4-5 times per week, currently 1-2 times per week   Social History Narrative    , lives at home with     Has 1 daughter     Counseling given: Not Answered    Family History   Problem Relation Age of Onset    Breast Cancer Mother 48    Other (osteoporosis) Mother     Heart Disorder Father 43        CABG x 5, had stents later in life    Diabetes Father     Cancer Paternal Grandmother         breast cancer, colon cancer    Diabetes Paternal Grandmother     Breast Cancer Paternal Grandmother 50        50's?    Heart Disorder Paternal Grandfather          from AMI    Heart Disorder Maternal Aunt     Breast Cancer Maternal Aunt 70        70's?    Heart Disorder Maternal Aunt     Breast Cancer Maternal Aunt 70        70's?    Heart Disorder Maternal Aunt     Breast Cancer Maternal Aunt 60        60's    Breast Cancer Maternal Aunt 80        80?    Heart Disorder Paternal Uncle 30         from AMI    Heart Disorder Paternal Uncle 45         from AMI    Cancer Paternal Uncle 48         from lung cancer    Heart Disorder Paternal Uncle 38        CABG    Heart Disorder Paternal Uncle 46         from CAD    Diabetes Paternal Uncle     Heart Disorder Paternal Uncle 60         from CAD    Diabetes Paternal Uncle     Ovarian Cancer Paternal Cousin Female 13        passed away age 14     Family Status   Relation Status    Mo Alive    Fa     PGMA     PGFA     Mat Aunt     Mat Aunt     Mat Aunt Alive    Mat Aunt Alive     Paternal Unc     Paternal Unc     Paternal Unc     Paternal Unc     Paternal Unc     Pat Cous Fem (Not Specified)        REVIEW OF SYSTEMS:   See HPI    EXAM:   /58   Pulse 72   Temp 97 °F (36.1 °C) (Temporal)   Resp 16   Ht 5' 4.27\" (1.632 m)   Wt 150 lb 3.2 oz (68.1 kg)   BMI 25.56 kg/m²  Estimated body mass index is 25.56 kg/m² as calculated from the following:    Height as of this encounter: 5' 4.27\" (1.632 m).    Weight as of this encounter: 150 lb 3.2 oz (68.1 kg).   Vital signs reviewed. Appears stated age, well groomed.  Physical Exam:  GEN:  Patient is alert and oriented x3, no apparent distress  HEAD:  Normocephalic, atraumatic  HEENT:  no scleral icterus, conjunctivae clear bilaterally  LUNGS: clear to auscultation bilaterally, no rales/rhonchi/wheezing  HEART:  Regular rate and rhythm, normal S1/S2, no murmurs, rubs or gallops  EXTREMITIES:  Moves all extremities well  NEURO:  CN 2 - 12 grossly intact, gait normal      ASSESSMENT AND PLAN:   1. Chronic bilateral low back pain without sciatica  5. Pain in both lower extremities  Patient presents for follow up of chronic low back pain  - continue pregabalin 100mg nightly  - will follow up in 6mo or sooner if needed    2. Insomnia, unspecified type  - continue trazodone 150mg at this time, then follow up with neurology to discuss switching back to nortriptyline which I explained takes 4-6 weeks to take full effect.    3. Ambulates with cane  4. Encounter for completion of form with patient  Parking placard form     6. Osteoporosis, unspecified osteoporosis type, unspecified pathological fracture presence  Continue boniva due to significant improvement in femur and not signficant improvement in hip; only minor insignificant decrease in spine. Repeat dexa in           Meds & Refills for this Visit:  Requested Prescriptions      No prescriptions requested or ordered in this encounter       Stop Taking                 MELOXICAM 15 MG Oral Tab    Take 1 tablet by mouth once daily            Health Maintenance:  Health Maintenance Due   Topic Date Due    Pneumococcal Vaccine: 65+ Years (1 of 2 - PCV) Never done    Zoster Vaccines (2 of 2) 08/17/2024    COVID-19 Vaccine (3 - 2024-25 season) 09/01/2024    Influenza Vaccine (1) 10/01/2024    HTN: BP Follow-Up  11/15/2024       Patient/Caregiver Education: There are no barriers to learning. Medical education done.   Outcome: Patient verbalizes understanding. Patient is notified to call with any questions, complications, allergies, or worsening or changing symptoms.  Patient is to call with any side effects or complications from the treatments as a result of today.     Problem List:  Patient Active Problem List   Diagnosis    Vitamin D deficiency    Insomnia, unspecified    Osteoporosis    Cervical disc disorder at C5-C6 level with radiculopathy    Ulnar nerve entrapment at elbow    Microscopic hematuria    S/P cervical spinal fusion    Adenosquamous carcinoma of anus (HCC)    Adenomatous polyp of colon    Nonrheumatic mitral valve regurgitation    Anal sphincter incontinence    Bone cyst of foot    History of anal cancer    De Quervain's tenosynovitis, bilateral    Polyarthralgia    Pain in both lower extremities       Return in about 4 months (around 3/18/2025) for Medicare Annual Wellness Visit, med check.    Terri Johnson MD  [facility]  11/18/24      Please note that portions of this note may have been completed with a voice recognition program. Efforts were made to edit the dictations but occasionally words are mis-transcribed. Thank you for your understanding.           [1]   Allergies  Allergen Reactions    Dander ANAPHYLAXIS     Dogs and cats     Iodine (Topical) HIVES and TONGUE SWELLING     Fish iodine     Radiology Contrast Iodinated Dyes ANAPHYLAXIS and HIVES    Skin Adhesives RASH     Skin glue, dermabond

## 2024-11-21 ENCOUNTER — PROCEDURE VISIT (OUTPATIENT)
Dept: NEUROLOGY | Facility: CLINIC | Age: 64
End: 2024-11-21
Payer: MEDICARE

## 2024-11-21 ENCOUNTER — MED REC SCAN ONLY (OUTPATIENT)
Dept: FAMILY MEDICINE CLINIC | Facility: CLINIC | Age: 64
End: 2024-11-21

## 2024-11-21 DIAGNOSIS — M25.50 POLYARTHRALGIA: Primary | ICD-10-CM

## 2024-11-21 DIAGNOSIS — M79.605 PAIN IN BOTH LOWER EXTREMITIES: ICD-10-CM

## 2024-11-21 DIAGNOSIS — M79.604 PAIN IN BOTH LOWER EXTREMITIES: ICD-10-CM

## 2024-11-21 NOTE — PROCEDURES
[address] [address]        Full Name: Hina Martin Gender: Female  Patient ID: [id number] YOB: 1960      Visit Date: 11/17/2024 11:08 AM  Age: 64 Years  Examining Physician: Dr. Power Mcdaniel  Referring Physician: Dr. Power Mcdaniel      Sensory NCS      Nerve / Sites Rec. Site Onset Lat Peak Lat NP Amp PP Amp Segments Distance Velocity Comment     ms ms µV µV  cm m/s    R Sural - (Antidromic)      Calf Ankle 1.72 2.34 5.3 7.0 Calf - Ankle 14 81    L Sural - (Antidromic)      Calf Ankle 2.19 3.07 4.1 7.3 Calf - Ankle 14 64        Motor NCS      Nerve / Sites Muscle Latency Amplitude Segments Dist. Lat Diff Velocity Comments     ms mV  cm ms m/s    R Peroneal - EDB      Ankle EDB 6.46 5.1 Ankle - EDB 8         B. Fib Head EDB 11.52 5.9 B. Fib Head - Ankle 21 5.06 41.5    L Peroneal - EDB      Ankle EDB 5.79 4.8 Ankle - EDB 8         B. Fib Head EDB 11.35 4.6 B. Fib Head - Ankle 23 5.56 41.3    L Tibial - AH      Ankle AH 5.98 10.4 Ankle - AH 8         Knee AH 13.96 8.2 Knee - Ankle 33 7.98 41.4    R Tibial - AH      Ankle AH 3.88 13.4 Ankle - AH 8         Knee AH 12.13 7.2 Knee - Ankle 32 8.25 38.8        EMG Summary Table     Spontaneous MUAP Recruitment   Muscle IA Fib PSW Fasc H.F. Amp Dur. PPP Pattern   L. Tibialis anterior N None None None None N N N N   L. Gastrocnemius (Medial head) N None None None None N N N N   L. Vastus medialis N None None None None N N N N   R. Tibialis anterior N None None None None N N N N   R. Gastrocnemius (Medial head) N None None None None N N N N   R. Vastus medialis N None None None None N N N N   R. Lumbar paraspinals (low) N None None None None       R. Lumbar paraspinals (mid) N None None None None       L. Lumbar paraspinals (low) N None None None None       L. Lumbar paraspinals (mid) N None None None None           Summary    The motor conduction test was normal in all 4 of the tested nerves: R  Peroneal - EDB, L Peroneal - EDB, L Tibial - AH, R Tibial - AH.    The sensory conduction test was normal in all 2 of the tested nerves: R Sural - (Antidromic), L Sural - (Antidromic).    The needle EMG study was normal in all 10 tested muscles: L. Tibialis anterior, L. Gastrocnemius (Medial head), L. Vastus medialis, R. Tibialis anterior, R. Gastrocnemius (Medial head), R. Vastus medialis, R. Lumbar paraspinals (low), R. Lumbar paraspinals (mid), L. Lumbar paraspinals (low), L. Lumbar paraspinals (mid).        Hina Martin [id number] 11/17/2024 11:08 AM     3 of 3    Conclusion:   This is a normal study.  There is no electrodiagnostic evidence of a lower extremity large fiber polyneuropathy or lumbar radiculopathy at this time.      ________________________  Dr. Power Mcdaniel  Neurology    Hina Martin [id number] 11/17/2024 11:08 AM     3 of 3

## 2024-12-05 ENCOUNTER — TELEPHONE (OUTPATIENT)
Dept: NEUROLOGY | Facility: CLINIC | Age: 64
End: 2024-12-05

## 2024-12-05 NOTE — TELEPHONE ENCOUNTER
Received progress notes DOS 12/05/24 from [facility], for review.     Placed in neurology bin for clinical staff.

## 2024-12-05 NOTE — TELEPHONE ENCOUNTER
Received Initial Evaluation from [facility] date of service 12/5/24. Placed in provider folder for review and signature.

## 2024-12-09 NOTE — TELEPHONE ENCOUNTER
Provider signed orders for PT. Faxed back to [facility] with confirmation.    Copy of orders sent to scanning.

## 2024-12-11 ENCOUNTER — APPOINTMENT (OUTPATIENT)
Dept: HEMATOLOGY/ONCOLOGY | Facility: HOSPITAL | Age: 64
End: 2024-12-11
Attending: INTERNAL MEDICINE
Payer: MEDICARE

## 2024-12-26 NOTE — PROGRESS NOTES
[facility] Note    Diagnosis: cT2 N0 MX Anal SCC.     Treatment History:  1. ChemoRT with 5-FU 4000 mg/m2 D1-4 and D29-32 + MMC 10 mg/m2 on D1 and D29: 11/30/20 to 01/08/21    Visit Diagnosis:  1. Anal cancer (HCC)        History of Present Illness: 64F with a PMH of Osteoporosis referred by Dr. Eli for anal cancer. Today is follow up for cT2 N0 Mx Anal SCCa. She had C1D29 of 5-FU + MMC on 12/28/20. She completed RT on 1/8/21. Her follow up imaging from 05/2021 shows OPAL.    Hematology/Oncology History:   She presented to see Dr. Eli on 9/14/20 due to hematochezia. This has been going on for 6 months associated with anal pain. Stool consistent has changed. Due to pain, she has a difficult time sitting. No prolapse. Her work up is below. Colonoscopy in 2015 with polyps. Pap was normal in 2018. Paternal grandfather with colon cancer. Mother with breast cancer. No smoking or etoh abuse. She is having issues with both neck and anal pain. She has not taken her norco or flexaril in fear of being addicted.     9/24/20: Colonoscopy with Dr. Eli: mass originating at dentate line--moderately differentiated non-keratinizing squamous cells, positive for CK4, p40, p16 and negative for CK20 and CDX2. Small population with CK7 positive cell. Overall--squamous cell carcinoma of anus, p16 positive. Possible small component of adenosquamous.     10/2/20: MRI Rectal 3T: pedunculated intra-luminal mass, measures 4.4 x 3.3 x 1.9 cm; along the right side there is a complex cyst (2.2 x 2.2 x 1.9 cm). Clinical T3c N0.      10/5/20: Discussed at [facility]--plan for PET/CT followed by excision of mass to further evaluate. Will need to evaluate cystic fluid. CEA 11.6.    10/9/20: PET/CT: Abnormal uptake in rectal/anal mass with SUV 15.9.     10/14/20: Anal canal mass excision and Right Lateral rectal sub-mucosal lesion: poorly differentiated carcinoma with squamous differentiation.     11/9/20: Normal Pap and  negative HPV.    11/30/20: Started ChemoRT with 5-FU + MMC  D1: 11/30/20  --Required G-CSF due to  on 12/15/20--  D29: 12/28/20  RT done on 1/8/21 05/06/21: CT Chest/Abdomen and MR Rectal protocol: There is no evidence of diease. There is slight thickening of the lateral rectal mucosa related to previous surgery. Case was reviewed at Rect[facility] and there is OPAL but she needs to follow up with Dr. Eli.     Anal EUS on 12/9/21: No evidence of disease     CT Chest/Abd + MRI Rectal from 09/2022: OPAL    CT CAP 4/11/24: LLL pleural based nodule appears calcified, unclear if there previously. Otherwise OPAL. A repeat CT chest in July 2024 showed resolution of this nodule. There is a stable 2 mm LLL nodule.     Interval History:  -Bowels are still fairly irregular ranging from diarrhea or constipation. NO cornelio hematochezia.  NO vaginal bleeding. No abdominal cramping   -Had a small traumatic R rib fracture   -No cough. Has chronic dyspnea which is unchanged     Review of Systems: 12 Point ROS was completed and pertinent positives are in the HPI    Current Outpatient Medications on File Prior to Visit   Medication Sig Dispense Refill    traZODone 150 MG Oral Tab Take 1 tablet (150 mg total) by mouth nightly. 30 tablet 2    pregabalin 100 MG Oral Cap TAKE 1 CAPSULE BY MOUTH ONCE DAILY IN THE EVENING 90 capsule 0    HYDROcodone-acetaminophen 5-325 MG Oral Tab Take 1-2 tablets by mouth every 4 (four) hours as needed for Pain. 90 tablet 0    albuterol 108 (90 Base) MCG/ACT Inhalation Aero Soln Inhale 2 puffs into the lungs every 4 (four) hours as needed for Wheezing. 1 each 0    Ibandronate Sodium 150 MG Oral Tab Take 1 tablet (150 mg total) by mouth every 30 (thirty) days. 3 tablet 3    nortriptyline 25 MG Oral Cap Take 1 capsule (25 mg total) by mouth nightly. (Patient not taking: No sig reported) 30 capsule 2     No current facility-administered medications on file prior to visit.     Past Medical History:    Anal  cancer (HCC)    Calculus of kidney    Exposure to medical diagnostic radiation    1/2021    Lower extremity edema    Osteoporosis    Personal history of antineoplastic chemotherapy    1/2021 completed    Plantar fasciitis    PONV (postoperative nausea and vomiting)    nausea    Rectal cancer (HCC)    Thumb tendonitis     Past Surgical History:   Procedure Laterality Date    Colonoscopy N/A 09/24/2020    Procedure: COLONOSCOPY;  Surgeon: Gareth Eli MD;  Location:  ENDOSCOPY    Colonoscopy N/A 12/09/2021    Procedure: COLONOSCOPY WITH BIOPSY;  Surgeon: Gareth Eli MD;  Location:  ENDOSCOPY    Colonoscopy,biopsy N/A 05/05/2015    Procedure: COLONOSCOPY, POSSIBLE BIOPSY, POSSIBLE POLYPECTOMY 84025;  Surgeon: Jeronimo Borja MD;  Location: [facility]    Colonoscopy,diagnostic  05/05/2015    2 splenic flexure polyps, diverticulosis    Colonoscopy,remv lesn,snare N/A 05/05/2015    Procedure: COLONOSCOPY, POSSIBLE BIOPSY, POSSIBLE POLYPECTOMY 69877;  Surgeon: Jeronimo Borja MD;  Location: [facility]    Hysterectomy  1988    1 ovary removed.    Oophorectomy  1988    one ovary removed.    Other  06/04/2018    CERVICAL 4 - CERVICAL 5, CERVICAL 5 - CERVICAL 6  ANTERIOR CERVICAL DISCECTOMY AND FUSION WITH INSTRUMENTATION, ALLOGRAFT AND  ALL INDICATED PROCEDURES    Other  09/09/2019    RIGHT CARPAL TUNNEL RELEASE     Other surgical history      breast implants    Other surgical history  09/2019    Ulnar transposition, carpal tunnel release right arm    Other surgical history  06/2018    C4-6 fusion    Other surgical history  11/18/2020    Port-A-Cath Placement with fluroscopy, left chest     Port, indwelling, imp      removed s/p chemo     Social History     Socioeconomic History    Marital status:     Number of children: 1   Occupational History    Occupation: Works as    Tobacco Use    Smoking status: Never    Smokeless tobacco: Never    Vaping Use    Vaping status: Never Used   Substance and Sexual Activity    Alcohol use: Not Currently    Drug use: No    Sexual activity: Yes     Partners: Male     Birth control/protection: Hysterectomy      Family History   Problem Relation Age of Onset    Breast Cancer Mother 48    Other (osteoporosis) Mother     Heart Disorder Father 43        CABG x 5, had stents later in life    Diabetes Father     Cancer Paternal Grandmother         breast cancer, colon cancer    Diabetes Paternal Grandmother     Breast Cancer Paternal Grandmother 50        50's?    Heart Disorder Paternal Grandfather          from AMI    Heart Disorder Maternal Aunt     Breast Cancer Maternal Aunt 70        70's?    Heart Disorder Maternal Aunt     Breast Cancer Maternal Aunt 70        70's?    Heart Disorder Maternal Aunt     Breast Cancer Maternal Aunt 60        60's    Breast Cancer Maternal Aunt 80        80?    Heart Disorder Paternal Uncle 30         from AMI    Heart Disorder Paternal Uncle 45         from AMI    Cancer Paternal Uncle 48         from lung cancer    Heart Disorder Paternal Uncle 38        CABG    Heart Disorder Paternal Uncle 46         from CAD    Diabetes Paternal Uncle     Heart Disorder Paternal Uncle 60         from CAD    Diabetes Paternal Uncle     Ovarian Cancer Paternal Cousin Female 13        passed away age 14       Physical Exam  Height: 162.6 cm (5' 4.02\") ( 1508)  Weight: 68.5 kg (151 lb) ( 150)  BSA (Calculated - sq m): 1.74 sq meters ( 150)  Pulse: 80 ( 1508)  BP: 114/74 ( 150)  Temp: 96.9 °F (36.1 °C) ( 150)  Do Not Use - Resp Rate: --  SpO2: 98 % ( 150)     General: NAD, AOX3  HEENT:  no jvd, no scleral icterus  LN: No inguinal LAD  CV: RRR S1S2  Lungs: CTAB, no increased WOB  Extremities: No edema   Abd: soft nt nd  Lungs:  no increased work of breathing CTAB  Neuro: CN: II-XII grossly intact    Results:reviewed   Lab Results    Component Value Date    WBC 5.2 10/15/2024    HGB 13.0 10/15/2024    HCT 37.8 10/15/2024    MCV 95.2 10/15/2024    .0 10/15/2024    EOSABS 0.00 12/21/2020     Lab Results   Component Value Date     07/30/2024    K 4.4 07/30/2024    CO2 26.0 07/30/2024     07/30/2024    BUN 26 (H) 07/30/2024    ALB 4.6 07/30/2024       Radiology: I personally reviewed the imaging    Pathology: reviewed     Assessment:   Anal Squamous Cell Ca: cT2 N0 Mx, p16 positive  -S/P Anal canal mass and Right sub-mucosal mass excision with Dr. Eli on 10/14/20  -Gynecology exam was normal  -ChemoRT with 5-FU/MMC on 11/30/20 to 1/8/21  -CEA normalized (11.6 to 0.8) on 12/8/20  -Discussed She likely has a favorable OS with 5 year OS > 80% and <20 Locoregional failure.   -She is s/p 3 years of imaging. No further imaging recommended. No evidence of recurrence   -Continue to follow up with surgery    LLL Nodule:Repeat CT chest ordered     Lower back pain with radiation down legs  -Previously was following with neurosurgery. Has a history of DJD and cervical stenosis. Most recent MRI with sacral insufficiency fracture. Will follow up with PCP and ortho    Due for colonoscopy in 12/2025-she will discuss with Dr. Lara    RTC in 3 months     [facility]

## 2024-12-28 DIAGNOSIS — G47.00 INSOMNIA, UNSPECIFIED TYPE: ICD-10-CM

## 2024-12-31 RX ORDER — TRAZODONE HYDROCHLORIDE 100 MG/1
150 TABLET ORAL NIGHTLY
Qty: 135 TABLET | Refills: 0 | OUTPATIENT
Start: 2024-12-31

## 2025-01-02 RX ORDER — TRAZODONE HYDROCHLORIDE 150 MG/1
150 TABLET ORAL NIGHTLY
Qty: 30 TABLET | Refills: 2 | Status: SHIPPED | OUTPATIENT
Start: 2025-01-02

## 2025-01-02 NOTE — TELEPHONE ENCOUNTER
Last Office Visit: 11/18/2024    Last Refill:   Medication Quantity Refills Start End   traZODone 100 MG Oral Tab (Discontinued) 135 tablet 0 9/28/2024 10/15/2024     Return to Clinic: 03/18/2025    Protocol: n/a    Refill pended. Please approve if okay. Thank you.

## 2025-01-07 ENCOUNTER — OFFICE VISIT (OUTPATIENT)
Age: 65
End: 2025-01-07
Attending: INTERNAL MEDICINE
Payer: MEDICARE

## 2025-01-07 VITALS
RESPIRATION RATE: 16 BRPM | SYSTOLIC BLOOD PRESSURE: 114 MMHG | DIASTOLIC BLOOD PRESSURE: 74 MMHG | WEIGHT: 151 LBS | TEMPERATURE: 97 F | HEART RATE: 80 BPM | OXYGEN SATURATION: 98 % | HEIGHT: 64.02 IN | BODY MASS INDEX: 25.78 KG/M2

## 2025-01-07 DIAGNOSIS — C21.0 ANAL CANCER (HCC): Primary | ICD-10-CM

## 2025-01-07 LAB
ALBUMIN SERPL-MCNC: 4 G/DL (ref 3.2–4.8)
ALBUMIN/GLOB SERPL: 1.5 {RATIO} (ref 1–2)
ALP LIVER SERPL-CCNC: 78 U/L
ALT SERPL-CCNC: 16 U/L
ANION GAP SERPL CALC-SCNC: 7 MMOL/L (ref 0–18)
AST SERPL-CCNC: 19 U/L (ref ?–34)
BASOPHILS # BLD AUTO: 0.03 X10(3) UL (ref 0–0.2)
BASOPHILS NFR BLD AUTO: 0.9 %
BILIRUB SERPL-MCNC: 0.3 MG/DL (ref 0.2–1.1)
BUN BLD-MCNC: 26 MG/DL (ref 9–23)
CALCIUM BLD-MCNC: 9.6 MG/DL (ref 8.7–10.4)
CHLORIDE SERPL-SCNC: 107 MMOL/L (ref 98–112)
CO2 SERPL-SCNC: 27 MMOL/L (ref 21–32)
CREAT BLD-MCNC: 0.95 MG/DL
DEPRECATED HBV CORE AB SER IA-ACNC: 45 NG/ML
EGFRCR SERPLBLD CKD-EPI 2021: 66 ML/MIN/1.73M2 (ref 60–?)
EOSINOPHIL # BLD AUTO: 0.1 X10(3) UL (ref 0–0.7)
EOSINOPHIL NFR BLD AUTO: 2.9 %
ERYTHROCYTE [DISTWIDTH] IN BLOOD BY AUTOMATED COUNT: 12.6 %
FOLATE SERPL-MCNC: 16.8 NG/ML (ref 5.4–?)
GLOBULIN PLAS-MCNC: 2.6 G/DL (ref 2–3.5)
GLUCOSE BLD-MCNC: 117 MG/DL (ref 70–99)
HCT VFR BLD AUTO: 38.9 %
HGB BLD-MCNC: 13.4 G/DL
IMM GRANULOCYTES # BLD AUTO: 0.01 X10(3) UL (ref 0–1)
IMM GRANULOCYTES NFR BLD: 0.3 %
IRON SATN MFR SERPL: 23 %
IRON SERPL-MCNC: 66 UG/DL
LYMPHOCYTES # BLD AUTO: 1.04 X10(3) UL (ref 1–4)
LYMPHOCYTES NFR BLD AUTO: 30.7 %
MCH RBC QN AUTO: 32.4 PG (ref 26–34)
MCHC RBC AUTO-ENTMCNC: 34.4 G/DL (ref 31–37)
MCV RBC AUTO: 94.2 FL
MONOCYTES # BLD AUTO: 0.14 X10(3) UL (ref 0.1–1)
MONOCYTES NFR BLD AUTO: 4.1 %
NEUTROPHILS # BLD AUTO: 2.07 X10 (3) UL (ref 1.5–7.7)
NEUTROPHILS # BLD AUTO: 2.07 X10(3) UL (ref 1.5–7.7)
NEUTROPHILS NFR BLD AUTO: 61.1 %
OSMOLALITY SERPL CALC.SUM OF ELEC: 298 MOSM/KG (ref 275–295)
PLATELET # BLD AUTO: 233 10(3)UL (ref 150–450)
POTASSIUM SERPL-SCNC: 4 MMOL/L (ref 3.5–5.1)
PROT SERPL-MCNC: 6.6 G/DL (ref 5.7–8.2)
RBC # BLD AUTO: 4.13 X10(6)UL
SODIUM SERPL-SCNC: 141 MMOL/L (ref 136–145)
TOTAL IRON BINDING CAPACITY: 288 UG/DL (ref 250–425)
TRANSFERRIN SERPL-MCNC: 220 MG/DL (ref 250–380)
VIT B12 SERPL-MCNC: 504 PG/ML (ref 211–911)
WBC # BLD AUTO: 3.4 X10(3) UL (ref 4–11)

## 2025-01-07 NOTE — PROGRESS NOTES
Patient here for follow-up. Will see surgery next month. Energy levels are still low. She is still experiencing bowel issues. Denies any updates or changes.

## 2025-01-08 ENCOUNTER — PATIENT MESSAGE (OUTPATIENT)
Age: 65
End: 2025-01-08

## 2025-01-08 PROBLEM — D50.9 IRON DEFICIENCY ANEMIA: Status: ACTIVE | Noted: 2025-01-08

## 2025-01-15 ENCOUNTER — OFFICE VISIT (OUTPATIENT)
Age: 65
End: 2025-01-15
Attending: INTERNAL MEDICINE
Payer: MEDICARE

## 2025-01-15 VITALS
OXYGEN SATURATION: 99 % | SYSTOLIC BLOOD PRESSURE: 114 MMHG | HEART RATE: 62 BPM | RESPIRATION RATE: 16 BRPM | TEMPERATURE: 97 F | DIASTOLIC BLOOD PRESSURE: 74 MMHG

## 2025-01-15 DIAGNOSIS — D50.9 IRON DEFICIENCY ANEMIA, UNSPECIFIED IRON DEFICIENCY ANEMIA TYPE: Primary | ICD-10-CM

## 2025-01-15 NOTE — PROGRESS NOTES
Education Record    Learner:  Patient    Disease / Diagnosis:    Barriers / Limitations:  None   Comments:    Method:  Discussion   Comments:    General Topics:  Medication, Side effects and symptom management, and Plan of care reviewed   Comments:    Outcome:  Shows understanding   Comments:    IV Infed test dose given. Pt tolerated well. Started full dose Infed at 1345 and completed at 1445 hours and pt tolerated well with no reaction. BP is stable  at the end of infusion. Pt discharged in stable condition.

## 2025-01-25 ENCOUNTER — TELEPHONE (OUTPATIENT)
Dept: FAMILY MEDICINE CLINIC | Facility: CLINIC | Age: 65
End: 2025-01-25

## 2025-01-25 DIAGNOSIS — M54.2 NECK PAIN: ICD-10-CM

## 2025-01-27 RX ORDER — PREGABALIN 100 MG/1
CAPSULE ORAL
Qty: 90 CAPSULE | Refills: 0 | Status: SHIPPED | OUTPATIENT
Start: 2025-01-27

## 2025-01-27 NOTE — TELEPHONE ENCOUNTER
Pt asking to s/w nurse regarding refill request sent via [email or web address].    Pt is completely out of medication and leaves tomorrow on vacation for 1 month.

## 2025-01-27 NOTE — TELEPHONE ENCOUNTER
LOV: 11/18/24    NOV: 3/21/25    Requesting refill:  pregabalin 100 MG - patient is out of medication and leaving tomorrow for vacation    Last refill: 10/11/24 #90       Please see pended Rx #90 for your approval. Thank you.

## 2025-03-21 ENCOUNTER — TELEPHONE (OUTPATIENT)
Dept: FAMILY MEDICINE CLINIC | Facility: CLINIC | Age: 65
End: 2025-03-21

## 2025-03-21 ENCOUNTER — LAB ENCOUNTER (OUTPATIENT)
Dept: LAB | Age: 65
End: 2025-03-21
Attending: STUDENT IN AN ORGANIZED HEALTH CARE EDUCATION/TRAINING PROGRAM
Payer: MEDICARE

## 2025-03-21 ENCOUNTER — OFFICE VISIT (OUTPATIENT)
Dept: FAMILY MEDICINE CLINIC | Facility: CLINIC | Age: 65
End: 2025-03-21
Payer: MEDICARE

## 2025-03-21 VITALS
HEART RATE: 84 BPM | SYSTOLIC BLOOD PRESSURE: 108 MMHG | BODY MASS INDEX: 26.53 KG/M2 | DIASTOLIC BLOOD PRESSURE: 80 MMHG | TEMPERATURE: 97 F | WEIGHT: 155.38 LBS | RESPIRATION RATE: 18 BRPM | HEIGHT: 64 IN

## 2025-03-21 DIAGNOSIS — M81.0 AGE-RELATED OSTEOPOROSIS WITHOUT CURRENT PATHOLOGICAL FRACTURE: ICD-10-CM

## 2025-03-21 DIAGNOSIS — M54.2 NECK PAIN: ICD-10-CM

## 2025-03-21 DIAGNOSIS — G89.29 CHRONIC BILATERAL LOW BACK PAIN WITHOUT SCIATICA: ICD-10-CM

## 2025-03-21 DIAGNOSIS — G47.00 INSOMNIA, UNSPECIFIED TYPE: ICD-10-CM

## 2025-03-21 DIAGNOSIS — E55.9 VITAMIN D DEFICIENCY: ICD-10-CM

## 2025-03-21 DIAGNOSIS — R73.09 ELEVATED GLUCOSE: ICD-10-CM

## 2025-03-21 DIAGNOSIS — Z12.31 VISIT FOR SCREENING MAMMOGRAM: ICD-10-CM

## 2025-03-21 DIAGNOSIS — M54.50 CHRONIC BILATERAL LOW BACK PAIN WITHOUT SCIATICA: ICD-10-CM

## 2025-03-21 DIAGNOSIS — Z00.00 ENCOUNTER FOR MEDICARE ANNUAL WELLNESS EXAM: Primary | ICD-10-CM

## 2025-03-21 DIAGNOSIS — Z85.048 HISTORY OF ANAL CANCER: ICD-10-CM

## 2025-03-21 DIAGNOSIS — Z59.87 MATERIAL HARDSHIP DUE TO LIMITED FINANCIAL RESOURCES, NOT ELSEWHERE CLASSIFIED: ICD-10-CM

## 2025-03-21 DIAGNOSIS — Z11.59 NEED FOR HEPATITIS C SCREENING TEST: ICD-10-CM

## 2025-03-21 LAB
EST. AVERAGE GLUCOSE BLD GHB EST-MCNC: 111 MG/DL (ref 68–126)
HBA1C MFR BLD: 5.5 % (ref ?–5.7)
HCV AB SERPL QL IA: NONREACTIVE
TSI SER-ACNC: 2.61 UIU/ML (ref 0.55–4.78)
VIT D+METAB SERPL-MCNC: 35.3 NG/ML (ref 30–100)

## 2025-03-21 PROCEDURE — 82306 VITAMIN D 25 HYDROXY: CPT

## 2025-03-21 PROCEDURE — 83036 HEMOGLOBIN GLYCOSYLATED A1C: CPT

## 2025-03-21 PROCEDURE — 84443 ASSAY THYROID STIM HORMONE: CPT

## 2025-03-21 PROCEDURE — 86803 HEPATITIS C AB TEST: CPT

## 2025-03-21 PROCEDURE — 36415 COLL VENOUS BLD VENIPUNCTURE: CPT

## 2025-03-21 RX ORDER — IBANDRONATE SODIUM 150 MG/1
150 TABLET, FILM COATED ORAL
Qty: 3 TABLET | Refills: 3 | Status: SHIPPED | OUTPATIENT
Start: 2025-03-21

## 2025-03-21 RX ORDER — HYDROCODONE BITARTRATE AND ACETAMINOPHEN 5; 325 MG/1; MG/1
1-2 TABLET ORAL EVERY 4 HOURS PRN
Qty: 90 TABLET | Refills: 0 | Status: SHIPPED | OUTPATIENT
Start: 2025-03-21 | End: 2025-03-28

## 2025-03-21 RX ORDER — PREGABALIN 100 MG/1
CAPSULE ORAL
Qty: 90 CAPSULE | Refills: 1 | Status: SHIPPED | OUTPATIENT
Start: 2025-03-21

## 2025-03-21 RX ORDER — HYDROCODONE BITARTRATE AND ACETAMINOPHEN 5; 325 MG/1; MG/1
1 TABLET ORAL
Qty: 30 TABLET | Refills: 0 | Status: SHIPPED | OUTPATIENT
Start: 2025-03-21

## 2025-03-21 RX ORDER — TRAZODONE HYDROCHLORIDE 150 MG/1
150 TABLET ORAL NIGHTLY
Qty: 90 TABLET | Refills: 1 | Status: SHIPPED | OUTPATIENT
Start: 2025-03-21

## 2025-03-21 SDOH — ECONOMIC STABILITY - INCOME SECURITY: MATERIAL HARDSHIP DUE TO LIMITED FINANCIAL RESOURCES, NOT ELSEWHERE CLASSIFIED: Z59.87

## 2025-03-21 NOTE — PATIENT INSTRUCTIONS
Information about healthcare power of     [email or web address]    [email or web address]     Refill policies:      Allow 3 business days for refills; controlled substances may take longer.  Contact your pharmacy at least 5-7 business days prior to running out of medication and have them send an electronic request or submit through the \"request refill\" option thru your FlipKey account. No need to do both, as multiple requests will create an automated FlipKey message to notify of a denial for one of the duplicated requests, causing you undue confusion.   Refills are NOT addressed on weekends; covering physicians do not authorize routine medications on weekends.  No narcotics or controlled substances are refilled after noon on Fridays or by on call physicians.  By law, narcotics cannot be faxed or phoned into your pharmacy.  If your prescription is due for a refill, you may be due for a follow up appointment. Please call our office at [phone] to make an appointment or schedule an appointment via FlipKey.  To best provide you care, patients receiving routine medications need to be seen at least twice a year. Patients receiving narcotic/controlled substance medications need to be seen at least once every 3 months.  In the event that your preferred pharmacy does not have the requested medication in stock (ie Backordered), it is your responsibility to find another pharmacy that has the requested medication available. We will gladly send a new prescription to that pharmacy at your request.  controlled substances may not be able to be filled out of state due to license restrictions.  If you have a planned trip, it's best to call your pharmacy at least 5-7 business days to prevent any delays in your medication refill.    Scheduling Tests:    If your physician  has ordered radiology tests such as MRI or CT scans, please contact Central Scheduling at [phone] right away to schedule the test.  Once scheduled, the [facility] Centralized Referral Team will work with your insurance carrier to obtain pre-certification or prior authorization.  Depending on your insurance carrier, approval may take 3-10 days.  It is highly recommended patients assure they have received an authorization before having a test performed.  If test is done without insurance authorization, patient may be responsible for the entire amount billed.      Precertification and Prior Authorizations:  If your physician has recommended that you have a procedure or additional testing performed the [facility] Centralized Referral Team will contact your insurance carrier to obtain pre-certification or prior authorization.    You are strongly encouraged to contact your insurance carrier to verify that your procedure/test has been approved and is a COVERED benefit.  Although the [facility] Centralized Referral Team does its due diligence, the insurance carrier gives the disclaimer that \"Although the procedure is authorized, this does not guarantee payment.\"    Ultimately the patient is responsible for payment.   Thank you for your understanding in this matter.  Paperwork Completion:  If you require FMLA or disability paperwork for your recovery, please make sure to either drop it off or have it faxed to our office at [phone]. Be sure the form has your name and date of birth on it.  The form will be faxed to our Forms Department and they will complete it for you.  There is a 25$ fee for all forms that need to be filled out.  Please be aware there is a 10-14 day turnaround time.  You will need to sign a release of information (MOISES) form if your paperwork does not come with one.  You may call the Forms Department with any questions at [phone].  Their fax number is [phone].

## 2025-03-21 NOTE — TELEPHONE ENCOUNTER
Pharmacist calling regarding hydrocodone prescription sent today.   [facility] policy can only do initial 7 days supply at max of 5 tablets a day.    States that it's not considered chronic pain since patient has not gotten this medication in the past 6 months so they cannot dispense the prescribed quantity and is considered initial acute pain. Pharmacy can only dispense max of 35 tablets. Thank you.

## 2025-03-21 NOTE — PROGRESS NOTES
Subjective:   Hina Martin is a 64 year old female who presents for a Medicare Wellness Visit charge within the last 11 months and Patient may not meet criteria for AWV: Please evaluate for correct coding and scheduled follow up of multiple significant but stable problems.     Med/Surg/Allergy/Fam hx updates: see below  Home: living with  he is recovering from stroke July 2024  Activities/Hobbies: yes  Diet: tried weight watchers for 6 mo and lost 2lbs; tried nutrisystem, lost 3lbs  Exercise: yes - stretching and walking a bit  Sleep: see below  Mood: good  Periods: No LMP recorded. Patient has had a hysterectomy.  Screenings: due for mammogram    Patient is still taking trazodone 150mg for sleep. It is more consistent with the one pill dosing.     Patient is taking norco as needed for flare up of back pain. Otherwise taking lyrica 100mg nightly. This helps with the chronic back pain. Flare up of upper body pain as well. Will take norco as needed then will be fine for a while.    Still having leg pain and fatigue. Saw neurology and had EMG. Was recommended for physical therapy and ortho as well. It will happen in one leg or the other.    Has upcoming imaging to follow spot on the lungs.    Taking boniva 150mg monthly. Will get heartburn sometimes. Last dexa was 3/2024. Was on reclast in the past.     To see colorectal surgery.     Patient has never been a good speller but sometimes doesn't remember certain.     Wt Readings from Last 6 Encounters:   03/21/25 155 lb 6.4 oz (70.5 kg)   01/07/25 151 lb (68.5 kg)   11/18/24 150 lb 3.2 oz (68.1 kg)   10/15/24 149 lb 7.6 oz (67.8 kg)   08/14/24 147 lb (66.7 kg)   07/30/24 150 lb (68 kg)       History/Other:   Fall Risk Assessment:      3/19/2024   FALL RISK FLOWSHEET (All)    OP - Unsteady When Walking No    OP - Worries About Falling No    OP - Fallen in Last Year No        Cognitive Assessment:   She had a completely normal cognitive assessment - see flowsheet  entries     Functional Ability/Status:   Hina LOUISE Breece has a completely normal functional assessment. See flowsheet for details.    Depression Screening (PHQ):  PHQ-2 SCORE: 0  , done 3/21/2025             Advanced Directives:   She does NOT have a Living Will. [ ]  She does NOT have a Power of  for Health Care. [ ]  Discussed Advance Care Planning with patient (and family/surrogate if present). Standard forms made available to patient in After Visit Summary.      Patient Active Problem List   Diagnosis    Vitamin D deficiency    Insomnia, unspecified    Osteoporosis    Cervical disc disorder at C5-C6 level with radiculopathy    Ulnar nerve entrapment at elbow    Microscopic hematuria    S/P cervical spinal fusion    Adenosquamous carcinoma of anus (HCC)    Adenomatous polyp of colon    Nonrheumatic mitral valve regurgitation    Anal sphincter incontinence    Bone cyst of foot    History of anal cancer    De Quervain's tenosynovitis, bilateral    Polyarthralgia    Pain in both lower extremities    Iron deficiency anemia     Allergies:  She is allergic to dander, iodine (topical), radiology contrast iodinated dyes, and skin adhesives.    Current Medications:  Outpatient Medications Marked as Taking for the 3/21/25 encounter (Office Visit) with Terri Johnson MD   Medication Sig    pregabalin 100 MG Oral Cap TAKE 1 CAPSULE BY MOUTH ONCE DAILY IN THE EVENING    traZODone 150 MG Oral Tab Take 1 tablet (150 mg total) by mouth nightly.    HYDROcodone-acetaminophen 5-325 MG Oral Tab Take 1-2 tablets by mouth every 4 (four) hours as needed for Pain.    albuterol 108 (90 Base) MCG/ACT Inhalation Aero Soln Inhale 2 puffs into the lungs every 4 (four) hours as needed for Wheezing.    Ibandronate Sodium 150 MG Oral Tab Take 1 tablet (150 mg total) by mouth every 30 (thirty) days.       Medical History:  She  has a past medical history of Anal cancer (HCC), Calculus of kidney, Exposure to medical diagnostic  radiation, Lower extremity edema, Osteoporosis, Personal history of antineoplastic chemotherapy, Plantar fasciitis, PONV (postoperative nausea and vomiting), Rectal cancer (HCC), and Thumb tendonitis.  Surgical History:  She  has a past surgical history that includes colonoscopy,diagnostic (05/05/2015); colonoscopy,remv lesn,snare (N/A, 05/05/2015); colonoscopy,biopsy (N/A, 05/05/2015); other (06/04/2018); other (09/09/2019); colonoscopy (N/A, 09/24/2020); hysterectomy (1988); other surgical history; other surgical history (09/2019); other surgical history (06/2018); other surgical history (11/18/2020); port, indwelling, imp; colonoscopy (N/A, 12/09/2021); and oophorectomy (1988).   Family History:  Her family history includes Breast Cancer (age of onset: 48) in her mother; Breast Cancer (age of onset: 50) in her paternal grandmother; Breast Cancer (age of onset: 60) in her maternal aunt; Breast Cancer (age of onset: 70) in her maternal aunt and maternal aunt; Breast Cancer (age of onset: 80) in her maternal aunt; Cancer in her paternal grandmother; Cancer (age of onset: 48) in her paternal uncle; Diabetes in her father, paternal grandmother, paternal uncle, and paternal uncle; Heart Disorder in her maternal aunt, maternal aunt, maternal aunt, and paternal grandfather; Heart Disorder (age of onset: 30) in her paternal uncle; Heart Disorder (age of onset: 38) in her paternal uncle; Heart Disorder (age of onset: 43) in her father; Heart Disorder (age of onset: 45) in her paternal uncle; Heart Disorder (age of onset: 46) in her paternal uncle; Heart Disorder (age of onset: 60) in her paternal uncle; Ovarian Cancer (age of onset: 13) in her paternal cousin female; osteoporosis in her mother.  Social History:  She  reports that she has never smoked. She has never used smokeless tobacco. She reports that she does not currently use alcohol. She reports that she does not use drugs.    Tobacco:  She has never smoked  tobacco.    CAGE Alcohol Screen:   CAGE screening score of 0 on 3/28/2025, showing low risk of alcohol abuse.      Patient Care Team:  Terri Johnson MD as PCP - General  Damian Alonzo MD as Consulting Physician (NEUROSURGERY)  Emerald Nicole APRN as Palliative Care Provider (Nurse Practitioner)  Lauren Mckoy MD (Radiation Oncology)  Jaquelin Nuñez MD (Radiation Oncology)  Alejandro Hassan RN as Registered Nurse (Registered Nurse)  Kelly Logan APN (Nurse Practitioner Family)  Power Mcdaniel DO as Consulting Physician (NEUROLOGY)    Review of Systems  GENERAL: feels well otherwise  SKIN: denies any unusual skin lesions  EYES: denies blurred vision or double vision  HEENT: denies nasal congestion, sinus pain or ST  LUNGS: denies shortness of breath with exertion  CARDIOVASCULAR: denies chest pain on exertion  GI: denies abdominal pain, denies heartburn  : denies dysuria, vaginal discharge or itching, no complaint of urinary incontinence   MUSCULOSKELETAL: +chronic back pain  NEURO: denies headaches  PSYCHE: denies depression or anxiety  HEMATOLOGIC: denies hx of anemia  ENDOCRINE: denies thyroid history  ALL/ASTHMA: + hx of allergy    Objective:   Physical Exam  General Appearance:  Alert, cooperative, no distress, appears stated age   Head:  Normocephalic, without obvious abnormality, atraumatic   Eyes:  PERRL, conjunctiva/corneas clear, EOM's intact both eyes   Ears:  Normal TM's and external ear canals, both ears   Nose: Nares normal, septum midline,mucosa normal, no drainage   Throat: Lips, mucosa, and tongue normal; teeth and gums normal   Neck: Supple, symmetrical, trachea midline, no adenopathy;  thyroid: not enlarged, symmetric, no tenderness/mass/nodules; no JVD   Back:   Symmetric, no curvature, ROM normal   Lungs:   Clear to auscultation bilaterally, respirations unlabored   Heart:  Regular rate and rhythm, S1 and S2 normal, no murmur, rub, or gallop   Abdomen:   Soft,  non-tender, bowel sounds active all four quadrants,  no masses, no organomegaly   Pelvic: Deferred   Extremities: Extremities normal, atraumatic, no cyanosis or edema   Pulses: 2+ and symmetric   Skin: Skin color, texture, turgor normal, no rashes or lesions   Lymph nodes: Cervical nodes normal   Neurologic: Normal       /80   Pulse 84   Temp 97.4 °F (36.3 °C) (Temporal)   Resp 18   Ht 5' 4\" (1.626 m)   Wt 155 lb 6.4 oz (70.5 kg)   BMI 26.67 kg/m²  Estimated body mass index is 26.67 kg/m² as calculated from the following:    Height as of this encounter: 5' 4\" (1.626 m).    Weight as of this encounter: 155 lb 6.4 oz (70.5 kg).    Medicare Hearing Assessment:   Hearing Screening    Time taken: 3/21/2025  1:07 PM  Screening Method: Finger Rub  Finger Rub Result: Pass               Assessment & Plan:   Hina Martin is a 64 year old female who presents for a Medicare Assessment.     1. Encounter for Medicare annual wellness exam (Primary)  2. Chronic bilateral low back pain without sciatica  Stable on current regimen. Will continue medications as ordered. Follow up in 6mo or sooner if needed.  -     Discontinue: HYDROcodone-Acetaminophen; Take 1-2 tablets by mouth every 4 (four) hours as needed for Pain.  Dispense: 90 tablet; Refill: 0  -     Comp Metabolic Panel (14); Future; Expected date: 09/21/2025  -     TSH W Reflex To Free T4; Future; Expected date: 03/21/2025  -     HYDROcodone-Acetaminophen; Take 1 tablet by mouth every 4 to 6 hours as needed for Pain (not to exceed 5 tablets in 24 hours).  Dispense: 30 tablet; Refill: 0  3. Neck pain  Stable, cpm, follow up 6mo or sooner if needed  -     Pregabalin; TAKE 1 CAPSULE BY MOUTH ONCE DAILY IN THE EVENING  Dispense: 90 capsule; Refill: 1  -     TSH W Reflex To Free T4; Future; Expected date: 03/21/2025  -     HYDROcodone-Acetaminophen; Take 1 tablet by mouth every 4 to 6 hours as needed for Pain (not to exceed 5 tablets in 24 hours).  Dispense: 30  tablet; Refill: 0  4. Visit for screening mammogram  Due soon  -     3D Mammogram Digital Screen, Bilateral (CPT=77067/60816); Future; Expected date: 04/04/2025  5. Need for hepatitis C screening test  - discussed recommendation for Hepatitis C screening in patients born between 1945 to 1965, patient agreeable  -     HCV Antibody; Future; Expected date: 03/21/2025  6. History of anal cancer  -     TSH W Reflex To Free T4; Future; Expected date: 03/21/2025  7. Age-related osteoporosis without current pathological fracture  Continue boniva, vitamin D/calcium supplementation, dexa due 2026  -     Comp Metabolic Panel (14); Future; Expected date: 09/21/2025  -     TSH W Reflex To Free T4; Future; Expected date: 03/21/2025  -     Vitamin D; Future; Expected date: 03/21/2025  -     Ibandronate Sodium; Take 1 tablet (150 mg total) by mouth every 30 (thirty) days.  Dispense: 3 tablet; Refill: 3  8. Vitamin D deficiency  will check level to determine supplementation recommendations as indicated.   -     Comp Metabolic Panel (14); Future; Expected date: 09/21/2025  -     TSH W Reflex To Free T4; Future; Expected date: 03/21/2025  -     Vitamin D; Future; Expected date: 03/21/2025  9. Insomnia, unspecified type  Stable on current regimen. Will continue medications as ordered. Follow up in 6mo or sooner if needed.  -     traZODone HCl; Take 1 tablet (150 mg total) by mouth nightly.  Dispense: 90 tablet; Refill: 1  -     TSH W Reflex To Free T4; Future; Expected date: 03/21/2025  10. Material hardship due to limited financial resources, not elsewhere classified  11. Elevated glucose  Will follow labs   -     TSH W Reflex To Free T4; Future; Expected date: 03/21/2025  -     Hemoglobin A1C; Future; Expected date: 03/21/2025    The patient indicates understanding of these issues and agrees to the plan.  Reinforced healthy diet, lifestyle, and exercise.      Return in 6 months (on 9/21/2025) for medication follow up, or sooner if  needed.     Terri Johnson MD, 3/21/2025     Supplementary Documentation:   General Health:       Health Maintenance   Topic Date Due    Pneumococcal Vaccine: 50+ Years (1 of 2 - PCV) Never done    Zoster Vaccines (2 of 2) 08/17/2024    COVID-19 Vaccine (3 - 2024-25 season) 09/01/2024    Influenza Vaccine (1) 10/01/2024    Annual Physical  03/19/2025    Mammogram  04/04/2025    Colorectal Cancer Screening  12/16/2025    DEXA Scan  Completed    Annual Depression Screening  Completed    Fall Risk Screening (Annual)  Completed    Meningococcal B Vaccine  Aged Out

## 2025-03-27 ENCOUNTER — OFFICE VISIT (OUTPATIENT)
Facility: LOCATION | Age: 65
End: 2025-03-27
Payer: MEDICARE

## 2025-03-27 VITALS
RESPIRATION RATE: 18 BRPM | HEART RATE: 77 BPM | DIASTOLIC BLOOD PRESSURE: 67 MMHG | SYSTOLIC BLOOD PRESSURE: 111 MMHG | OXYGEN SATURATION: 97 % | TEMPERATURE: 98 F

## 2025-03-27 DIAGNOSIS — R15.1 FECAL SMEARING: ICD-10-CM

## 2025-03-27 DIAGNOSIS — Z85.048 HISTORY OF RECTAL CANCER: Primary | ICD-10-CM

## 2025-03-27 PROCEDURE — 99214 OFFICE O/P EST MOD 30 MIN: CPT | Performed by: STUDENT IN AN ORGANIZED HEALTH CARE EDUCATION/TRAINING PROGRAM

## 2025-03-27 RX ORDER — POLYETHYLENE GLYCOL 3350, SODIUM CHLORIDE, SODIUM BICARBONATE, POTASSIUM CHLORIDE 420; 11.2; 5.72; 1.48 G/4L; G/4L; G/4L; G/4L
POWDER, FOR SOLUTION ORAL
Qty: 1 EACH | Refills: 0 | Status: SHIPPED | OUTPATIENT
Start: 2025-03-27

## 2025-03-27 NOTE — PROGRESS NOTES
Follow Up Visit Note        Active Problems      1. History of rectal cancer    2. Fecal smearing          Chief Complaint   Chief Complaint   Patient presents with    Establish Care     EP- 6 months f/u Anal Cancer- no changes since last visit, same symptoms        History of Present Illness  This is a very nice 64-year-old female with history of squamous cell carcinoma of the anal canal previously known to Dr. Eli and Dr. Waldrop. Patient underwent transanal excision of the lesion on 10/14/2020 followed by chemoradiotherapy completed on 1/8/2021. She follows up with Dr. Lindquist of medical oncology regularly and surveillance imaging has been negative for recurrence. Dr. Waldrop performed the patient's most recent colonoscopy on 12/16/2022. This demonstrated radiation proctitis and diverticulosis. He recommended a 3-year recall. Patient established care with me on 8/29/2024.    Unfortunately, patient has chronic bowel dysfunction. She has a daily mucus discharge and wears a pad. She has some episodes of fecal incontinence at least once a week. She denies any rectal pain, bleeding or feeling any sort of mass in the area. She is currently regulating her bowel movements through prebiotics and probiotics. She has never completed formal pelvic floor physical therapy. Patient is currently retired and her  unfortunately has had a stroke recently.     Patient has not tried taking a daily fiber supplement.  I referred the patient to pelvic floor physical therapy when I met her on 8/29/2024 but she has been unable to schedule this due to caring for her .    Allergies  Hina is allergic to dander, iodine (topical), radiology contrast iodinated dyes, and skin adhesives.    Past Medical / Surgical / Social / Family History    The past medical and past surgical history have been reviewed by me today.    Past Medical History:    Anal cancer (HCC)    Calculus of kidney    Exposure to medical diagnostic radiation     1/2021    Lower extremity edema    Osteoporosis    Personal history of antineoplastic chemotherapy    1/2021 completed    Plantar fasciitis    PONV (postoperative nausea and vomiting)    nausea    Rectal cancer (HCC)    Thumb tendonitis     Past Surgical History:   Procedure Laterality Date    Colonoscopy N/A 09/24/2020    Procedure: COLONOSCOPY;  Surgeon: Gareth Eli MD;  Location:  ENDOSCOPY    Colonoscopy N/A 12/09/2021    Procedure: COLONOSCOPY WITH BIOPSY;  Surgeon: Gareth Eil MD;  Location:  ENDOSCOPY    Colonoscopy,biopsy N/A 05/05/2015    Procedure: COLONOSCOPY, POSSIBLE BIOPSY, POSSIBLE POLYPECTOMY 59690;  Surgeon: Jeronimo Borja MD;  Location: [facility]    Colonoscopy,diagnostic  05/05/2015    2 splenic flexure polyps, diverticulosis    Colonoscopy,remv lesn,snare N/A 05/05/2015    Procedure: COLONOSCOPY, POSSIBLE BIOPSY, POSSIBLE POLYPECTOMY 53150;  Surgeon: Jeronimo Borja MD;  Location: [facility]    Hysterectomy  1988    1 ovary removed.    Oophorectomy  1988    one ovary removed.    Other  06/04/2018    CERVICAL 4 - CERVICAL 5, CERVICAL 5 - CERVICAL 6  ANTERIOR CERVICAL DISCECTOMY AND FUSION WITH INSTRUMENTATION, ALLOGRAFT AND  ALL INDICATED PROCEDURES    Other  09/09/2019    RIGHT CARPAL TUNNEL RELEASE     Other surgical history      breast implants    Other surgical history  09/2019    Ulnar transposition, carpal tunnel release right arm    Other surgical history  06/2018    C4-6 fusion    Other surgical history  11/18/2020    Port-A-Cath Placement with fluroscopy, left chest     Port, indwelling, imp      removed s/p chemo       The family history and social history have been reviewed by me today.    Family History   Problem Relation Age of Onset    Breast Cancer Mother 48    Other (osteoporosis) Mother     Heart Disorder Father 43        CABG x 5, had stents later in life    Diabetes Father     Cancer Paternal Grandmother          breast cancer, colon cancer    Diabetes Paternal Grandmother     Breast Cancer Paternal Grandmother 50        50's?    Heart Disorder Paternal Grandfather          from AMI    Heart Disorder Maternal Aunt     Breast Cancer Maternal Aunt 70        70's?    Heart Disorder Maternal Aunt     Breast Cancer Maternal Aunt 70        70's?    Heart Disorder Maternal Aunt     Breast Cancer Maternal Aunt 60        60's    Breast Cancer Maternal Aunt 80        80?    Heart Disorder Paternal Uncle 30         from AMI    Heart Disorder Paternal Uncle 45         from AMI    Cancer Paternal Uncle 48         from lung cancer    Heart Disorder Paternal Uncle 38        CABG    Heart Disorder Paternal Uncle 46         from CAD    Diabetes Paternal Uncle     Heart Disorder Paternal Uncle 60         from CAD    Diabetes Paternal Uncle     Ovarian Cancer Paternal Cousin Female 13        passed away age 14     Social History     Socioeconomic History    Marital status:     Number of children: 1   Occupational History    Occupation: Works as    Tobacco Use    Smoking status: Never    Smokeless tobacco: Never   Vaping Use    Vaping status: Never Used   Substance and Sexual Activity    Alcohol use: Not Currently    Drug use: No    Sexual activity: Yes     Partners: Male     Birth control/protection: Hysterectomy   Other Topics Concern    Caffeine Concern Yes     Comment: 1-3 coffee per daily    Exercise Yes     Comment: walk      Current Outpatient Medications:   PEG 3350-KCl-Na Bicarb-NaCl 420 g Oral Recon Soln, Starting at 4:00 pm the night before procedure, drink 8 ounces of the prep every 15-20 minutes until finished, Disp: 1 each, Rfl: 0  HYDROcodone-acetaminophen 5-325 MG Oral Tab, Take 1-2 tablets by mouth every 4 (four) hours as needed for Pain., Disp: 90 tablet, Rfl: 0  pregabalin 100 MG Oral Cap, TAKE 1 CAPSULE BY MOUTH ONCE DAILY IN THE EVENING, Disp: 90 capsule, Rfl: 1  traZODone 150  MG Oral Tab, Take 1 tablet (150 mg total) by mouth nightly., Disp: 90 tablet, Rfl: 1  Ibandronate Sodium 150 MG Oral Tab, Take 1 tablet (150 mg total) by mouth every 30 (thirty) days., Disp: 3 tablet, Rfl: 3  HYDROcodone-acetaminophen 5-325 MG Oral Tab, Take 1 tablet by mouth every 4 to 6 hours as needed for Pain (not to exceed 5 tablets in 24 hours)., Disp: 30 tablet, Rfl: 0  albuterol 108 (90 Base) MCG/ACT Inhalation Aero Soln, Inhale 2 puffs into the lungs every 4 (four) hours as needed for Wheezing., Disp: 1 each, Rfl: 0     Review of Systems  A 10 point review of systems was performed and negative unless otherwise documented per HPI.     Physical Findings   /67   Pulse 77   Temp 97.5 °F (36.4 °C) (Temporal)   Resp 18   SpO2 97%   Physical Exam  Vitals and nursing note reviewed. Exam conducted with a chaperone present.   Constitutional:       General: She is not in acute distress.  HENT:      Head: Normocephalic and atraumatic.      Mouth/Throat:      Mouth: Mucous membranes are moist.   Cardiovascular:      Rate and Rhythm: Normal rate and regular rhythm.   Pulmonary:      Effort: Pulmonary effort is normal.   Abdominal:      General: There is no distension.      Palpations: Abdomen is soft.      Tenderness: There is no abdominal tenderness.   Genitourinary:     Comments: Patient examined in the prone jackknife position with female medical assistant chaperone present.  External exam of the anus is normal.  Digital rectal exam shows fair tone with a flat scar palpable along the right side of the anal canal.  There is no mass, bleeding, drainage or tenderness.  Anoscopy reveals a flat, white scar along the right side of the anal canal.  No inguinal lymphadenopathy.  Musculoskeletal:         General: No deformity.   Skin:     General: Skin is warm and dry.   Neurological:      General: No focal deficit present.      Mental Status: She is alert.   Psychiatric:         Mood and Affect: Mood normal.          Assessment & Plan     This is a very nice 64-year-old female with history of squamous cell carcinoma of the anal canal previously known to Dr. Eli and Dr. Waldrop. Patient underwent transanal excision of the lesion on 10/14/2020 followed by chemoradiotherapy completed on 1/8/2021. She follows up with Dr. Lindquist of medical oncology regularly and surveillance imaging has been negative for recurrence. Dr. Waldrop performed the patient's most recent colonoscopy on 12/16/2022. This demonstrated radiation proctitis and diverticulosis. He recommended a 3-year recall. Patient established care with me on 8/29/2024.    Unfortunately, patient has chronic bowel dysfunction. She has a daily mucus discharge and wears a pad. She has some episodes of fecal incontinence at least once a week. She denies any rectal pain, bleeding or feeling any sort of mass in the area. She is currently regulating her bowel movements through prebiotics and probiotics. She has never completed formal pelvic floor physical therapy. Patient is currently retired and her  unfortunately has had a stroke recently.     Patient has not tried taking a daily fiber supplement.  I referred the patient to pelvic floor physical therapy when I met her on 8/29/2024 but she has been unable to schedule this due to caring for her .    External exam of the anus is normal. Digital rectal exam shows fair tone with a flat scar palpable along the right side of the anal canal. There is no mass, bleeding, drainage or tenderness. Anoscopy reveals a flat, white scar along the right side of the anal canal. No inguinal lymphadenopathy.     Plan  I reassured the patient there are no signs of local disease recurrence based on bedside exam findings today with a anoscopy.  I recommend considering pelvic floor physical therapy to see if this can help manage her bowel dysfunction.  Patient was interested in trying pelvic floor physical therapy.  I asked the patient to  call their office to schedule a consultation with them.       Patient will be due for a colonoscopy in December 2025.  I do recommend scheduling a colonoscopy by the end of this year.  The details of this procedure were discussed including the prep instructions, risks, benefits and alternatives.  Patient expressed understanding and was agreeable to schedule a colonoscopy.  This has been scheduled for 10/14/2025 at [facility] under monitored anesthesia care.    I will continue to follow the patient and help her manage her bowel dysfunction as much as possible.  I recommend starting with a daily fiber supplement and pelvic floor physical therapy.  If she continues to have life-limiting symptoms despite these measures, surgical treatment options such as sacral neuromodulation can be considered.     No orders of the defined types were placed in this encounter.    Imaging & Referrals   None    Follow Up  No follow-ups on file.    Riccardo Lara MD

## 2025-05-16 ENCOUNTER — HOSPITAL ENCOUNTER (OUTPATIENT)
Dept: CT IMAGING | Facility: HOSPITAL | Age: 65
Discharge: HOME | End: 2025-05-16
Attending: INTERNAL MEDICINE
Payer: MEDICARE

## 2025-05-16 DIAGNOSIS — C21.0 ANAL CANCER (HCC): ICD-10-CM

## 2025-05-16 PROCEDURE — 71250 CT THORAX DX C-: CPT | Performed by: INTERNAL MEDICINE

## 2025-05-19 NOTE — PROGRESS NOTES
[facility] Note    Diagnosis: cT2 N0 MX Anal SCC.     Treatment History:  1. ChemoRT with 5-FU 4000 mg/m2 D1-4 and D29-32 + MMC 10 mg/m2 on D1 and D29: 11/30/20 to 01/08/21    Visit Diagnosis:  1. Anal cancer (HCC)          History of Present Illness: 64F with a PMH of Osteoporosis referred by Dr. Eli for anal cancer. Today is follow up for cT2 N0 Mx Anal SCCa. She had C1D29 of 5-FU + MMC on 12/28/20. She completed RT on 1/8/21. Her follow up imaging from 05/2021 shows OPAL.    Hematology/Oncology History:   She presented to see Dr. Eli on 9/14/20 due to hematochezia. This has been going on for 6 months associated with anal pain. Stool consistent has changed. Due to pain, she has a difficult time sitting. No prolapse. Her work up is below. Colonoscopy in 2015 with polyps. Pap was normal in 2018. Paternal grandfather with colon cancer. Mother with breast cancer. No smoking or etoh abuse. She is having issues with both neck and anal pain. She has not taken her norco or flexaril in fear of being addicted.     9/24/20: Colonoscopy with Dr. Eli: mass originating at dentate line--moderately differentiated non-keratinizing squamous cells, positive for CK4, p40, p16 and negative for CK20 and CDX2. Small population with CK7 positive cell. Overall--squamous cell carcinoma of anus, p16 positive. Possible small component of adenosquamous.     10/2/20: MRI Rectal 3T: pedunculated intra-luminal mass, measures 4.4 x 3.3 x 1.9 cm; along the right side there is a complex cyst (2.2 x 2.2 x 1.9 cm). Clinical T3c N0.      10/5/20: Discussed at [facility]--plan for PET/CT followed by excision of mass to further evaluate. Will need to evaluate cystic fluid. CEA 11.6.    10/9/20: PET/CT: Abnormal uptake in rectal/anal mass with SUV 15.9.     10/14/20: Anal canal mass excision and Right Lateral rectal sub-mucosal lesion: poorly differentiated carcinoma with squamous differentiation.     11/9/20: Normal Pap and  negative HPV.    11/30/20: Started ChemoRT with 5-FU + MMC  D1: 11/30/20  --Required G-CSF due to  on 12/15/20--  D29: 12/28/20  RT done on 1/8/21 05/06/21: CT Chest/Abdomen and MR Rectal protocol: There is no evidence of diease. There is slight thickening of the lateral rectal mucosa related to previous surgery. Case was reviewed at [facility] and there is OPAL but she needs to follow up with Dr. Eli.     Anal EUS on 12/9/21: No evidence of disease     CT Chest/Abd + MRI Rectal from 09/2022: OPAL    CT CAP 4/11/24: LLL pleural based nodule appears calcified, unclear if there previously. Otherwise OPAL. A repeat CT chest in July 2024 showed resolution of this nodule. There is a stable 2 mm LLL nodule.     CT chest 05/2025: stable lung nodules     Interval History:  -CT chest with stable lung nodules   -Colonoscopy planned 10/14/25  -Has chronic fatigue. Has not seen PT yet  -Bowels still fairly irregular but unchanged     Review of Systems: 12 Point ROS was completed and pertinent positives are in the HPI    Current Outpatient Medications on File Prior to Visit   Medication Sig Dispense Refill    pregabalin 100 MG Oral Cap TAKE 1 CAPSULE BY MOUTH ONCE DAILY IN THE EVENING 90 capsule 1    traZODone 150 MG Oral Tab Take 1 tablet (150 mg total) by mouth nightly. 90 tablet 1    Ibandronate Sodium 150 MG Oral Tab Take 1 tablet (150 mg total) by mouth every 30 (thirty) days. 3 tablet 3    HYDROcodone-acetaminophen 5-325 MG Oral Tab Take 1 tablet by mouth every 4 to 6 hours as needed for Pain (not to exceed 5 tablets in 24 hours). 30 tablet 0    albuterol 108 (90 Base) MCG/ACT Inhalation Aero Soln Inhale 2 puffs into the lungs every 4 (four) hours as needed for Wheezing. 1 each 0    PEG 3350-KCl-Na Bicarb-NaCl 420 g Oral Recon Soln Starting at 4:00 pm the night before procedure, drink 8 ounces of the prep every 15-20 minutes until finished (Patient not taking: Reported on 5/20/2025) 1 each 0     No current  facility-administered medications on file prior to visit.     Past Medical History:    Anal cancer (HCC)    Calculus of kidney    Exposure to medical diagnostic radiation    1/2021    Lower extremity edema    Osteoporosis    Personal history of antineoplastic chemotherapy    1/2021 completed    Plantar fasciitis    PONV (postoperative nausea and vomiting)    nausea    Rectal cancer (HCC)    Thumb tendonitis     Past Surgical History:   Procedure Laterality Date    Colonoscopy N/A 09/24/2020    Procedure: COLONOSCOPY;  Surgeon: Gareth Eli MD;  Location:  ENDOSCOPY    Colonoscopy N/A 12/09/2021    Procedure: COLONOSCOPY WITH BIOPSY;  Surgeon: Gareth Eli MD;  Location:  ENDOSCOPY    Colonoscopy,biopsy N/A 05/05/2015    Procedure: COLONOSCOPY, POSSIBLE BIOPSY, POSSIBLE POLYPECTOMY 57122;  Surgeon: Jeronimo Borja MD;  Location: [facility]    Colonoscopy,diagnostic  05/05/2015    2 splenic flexure polyps, diverticulosis    Colonoscopy,remv lesn,snare N/A 05/05/2015    Procedure: COLONOSCOPY, POSSIBLE BIOPSY, POSSIBLE POLYPECTOMY 69876;  Surgeon: Jeronimo Borja MD;  Location: [facility]    Hysterectomy  1988    1 ovary removed.    Oophorectomy  1988    one ovary removed.    Other  06/04/2018    CERVICAL 4 - CERVICAL 5, CERVICAL 5 - CERVICAL 6  ANTERIOR CERVICAL DISCECTOMY AND FUSION WITH INSTRUMENTATION, ALLOGRAFT AND  ALL INDICATED PROCEDURES    Other  09/09/2019    RIGHT CARPAL TUNNEL RELEASE     Other surgical history      breast implants    Other surgical history  09/2019    Ulnar transposition, carpal tunnel release right arm    Other surgical history  06/2018    C4-6 fusion    Other surgical history  11/18/2020    Port-A-Cath Placement with fluroscopy, left chest     Port, indwelling, imp      removed s/p chemo     Social History     Socioeconomic History    Marital status:     Number of children: 1   Occupational History    Occupation: Works as     Tobacco Use    Smoking status: Never    Smokeless tobacco: Never   Vaping Use    Vaping status: Never Used   Substance and Sexual Activity    Alcohol use: Not Currently    Drug use: No    Sexual activity: Yes     Partners: Male     Birth control/protection: Hysterectomy      Family History   Problem Relation Age of Onset    Breast Cancer Mother 48    Other (osteoporosis) Mother     Heart Disorder Father 43        CABG x 5, had stents later in life    Diabetes Father     Cancer Paternal Grandmother         breast cancer, colon cancer    Diabetes Paternal Grandmother     Breast Cancer Paternal Grandmother 50        50's?    Heart Disorder Paternal Grandfather          from AMI    Heart Disorder Maternal Aunt     Breast Cancer Maternal Aunt 70        70's?    Heart Disorder Maternal Aunt     Breast Cancer Maternal Aunt 70        70's?    Heart Disorder Maternal Aunt     Breast Cancer Maternal Aunt 60        60's    Breast Cancer Maternal Aunt 80        80?    Heart Disorder Paternal Uncle 30         from AMI    Heart Disorder Paternal Uncle 45         from AMI    Cancer Paternal Uncle 48         from lung cancer    Heart Disorder Paternal Uncle 38        CABG    Heart Disorder Paternal Uncle 46         from CAD    Diabetes Paternal Uncle     Heart Disorder Paternal Uncle 60         from CAD    Diabetes Paternal Uncle     Ovarian Cancer Paternal Cousin Female 13        passed away age 14       Physical Exam  Height: 162.6 cm (5' 4.02\") ( 1255)  Weight: 73.4 kg (161 lb 12.8 oz) ( 1255)  BSA (Calculated - sq m): 1.79 sq meters ( 1255)  Pulse: 75 ( 1255)  BP: 126/66 ( 1255)  Temp: 97.6 °F (36.4 °C) ( 1255)  Do Not Use - Resp Rate: --  SpO2: 99 % ( 1255)     General: NAD, AOX3  HEENT:  no jvd, no scleral icterus  LN: No inguinal LAD  CV: RRR S1S2  Lungs: CTAB, no increased WOB  Extremities: No edema   Abd: soft nt nd  Lungs:  no increased work of  breathing CTAB  Neuro: CN: II-XII grossly intact    Results:reviewed   Lab Results   Component Value Date    WBC 3.4 (L) 01/07/2025    HGB 13.4 01/07/2025    HCT 38.9 01/07/2025    MCV 94.2 01/07/2025    .0 01/07/2025    EOSABS 0.00 12/21/2020     Lab Results   Component Value Date     01/07/2025    K 4.0 01/07/2025    CO2 27.0 01/07/2025     01/07/2025    BUN 26 (H) 01/07/2025    ALB 4.0 01/07/2025       Radiology: I personally reviewed the imaging    Pathology: reviewed     Assessment:   Anal Squamous Cell Ca: cT2 N0 Mx, p16 positive  -S/P Anal canal mass and Right sub-mucosal mass excision with Dr. Eli on 10/14/20  -Gynecology exam was normal  -ChemoRT with 5-FU/MMC on 11/30/20 to 1/8/21  -CEA normalized (11.6 to 0.8) on 12/8/20  -Discussed She likely has a favorable OS with 5 year OS > 80% and <20 Locoregional failure.   -She is s/p 3 years of imaging. No further imaging recommended. No evidence of recurrence   -Continue to follow up with surgery    LLL Nodule:CT chest shows stable nodule. Will repeat CT chest 05/2026    Chronic fatigue: referred to PT    Colonoscopy with Dr. Lara planned.     RTC in 12 months    [facility]

## 2025-05-20 ENCOUNTER — OFFICE VISIT (OUTPATIENT)
Age: 65
End: 2025-05-20
Attending: INTERNAL MEDICINE
Payer: MEDICARE

## 2025-05-20 VITALS
BODY MASS INDEX: 27.63 KG/M2 | DIASTOLIC BLOOD PRESSURE: 66 MMHG | SYSTOLIC BLOOD PRESSURE: 126 MMHG | TEMPERATURE: 98 F | HEIGHT: 64.02 IN | OXYGEN SATURATION: 99 % | RESPIRATION RATE: 16 BRPM | WEIGHT: 161.81 LBS | HEART RATE: 75 BPM

## 2025-05-20 DIAGNOSIS — C21.0 ANAL CANCER (HCC): Primary | ICD-10-CM

## 2025-05-20 NOTE — PROGRESS NOTES
Patient here for follow-up. She had a recent CT 5/6/25. Pain and bowel issues unchanged. Will have her colonoscopy this fall.

## 2025-06-03 ENCOUNTER — TELEPHONE (OUTPATIENT)
Dept: FAMILY MEDICINE CLINIC | Facility: CLINIC | Age: 65
End: 2025-06-03

## 2025-06-03 DIAGNOSIS — M54.2 NECK PAIN: ICD-10-CM

## 2025-06-03 DIAGNOSIS — M54.50 CHRONIC BILATERAL LOW BACK PAIN WITHOUT SCIATICA: ICD-10-CM

## 2025-06-03 DIAGNOSIS — G89.29 CHRONIC BILATERAL LOW BACK PAIN WITHOUT SCIATICA: ICD-10-CM

## 2025-06-03 RX ORDER — HYDROCODONE BITARTRATE AND ACETAMINOPHEN 5; 325 MG/1; MG/1
1 TABLET ORAL
Qty: 30 TABLET | Refills: 0 | OUTPATIENT
Start: 2025-06-03

## 2025-06-03 NOTE — TELEPHONE ENCOUNTER
LOV:03/21/2025  Last refill 03/21/2025      NOV:09/26/2025    Medication: Hydrocodone acetaminophen 5-325 mg

## 2025-06-03 NOTE — TELEPHONE ENCOUNTER
Rx refill   HYDROcodone-acetaminophen 5-325 MG Oral Tab     Send to   [facility] [address] [place] [address] ([place]), [place] - [address] [facility] [phone], [phone]   [address] [place] [address] ([place]) [place] [address]       LOV: 3/21/25

## 2025-06-16 RX ORDER — HYDROCODONE BITARTRATE AND ACETAMINOPHEN 5; 325 MG/1; MG/1
1 TABLET ORAL
Qty: 20 TABLET | Refills: 0 | Status: SHIPPED | OUTPATIENT
Start: 2025-06-16

## 2025-06-16 NOTE — TELEPHONE ENCOUNTER
LOV: 3/21/25    NOV: 9/26    Requesting refill: Norco 5-325mg     Last refill: 3/21/25 #30     Patient was seen in office 3/21 for physical. Per OV note, patient is taking norco as needed for flare up of back pain. Patient is out of medication and requesting for refill. Routed to on call.  Please see pended Rx for your approval. Thank you.

## 2025-06-16 NOTE — TELEPHONE ENCOUNTER
Patient out of medication and requesting update on refill. Patient would like a phone call when sent

## 2025-08-27 ENCOUNTER — TELEPHONE (OUTPATIENT)
Facility: LOCATION | Age: 65
End: 2025-08-27

## 2025-08-27 DIAGNOSIS — Z85.048 HISTORY OF RECTAL CANCER: Primary | ICD-10-CM

## (undated) DEVICE — TRANSPOSAL ULTRAFLEX DUO/QUAD ULTRA CART MANIFOLD

## (undated) DEVICE — SPECIMEN CONTAINER,POSITIVE SEAL INDICATOR, OR PACKAGED: Brand: PRECISION

## (undated) DEVICE — MINI LAP PACK-LF: Brand: MEDLINE INDUSTRIES, INC.

## (undated) DEVICE — SUTURE ETHILON 3-0 PS-2

## (undated) DEVICE — SOL  .9 1000ML BTL

## (undated) DEVICE — CAUTERY BLADE 2IN INS E1455

## (undated) DEVICE — STERILE POLYISOPRENE POWDER-FREE SURGICAL GLOVES: Brand: PROTEXIS

## (undated) DEVICE — SUTURE MONOCRYL 4-0 PS-2

## (undated) DEVICE — 1200CC GUARDIAN II: Brand: GUARDIAN

## (undated) DEVICE — DISPOSABLE BIPOLAR FORCEPS 4" (10.2CM) JEWELERS, STRAIGHT 0.4MM TIP AND 12 FT. (3.6M) CABLE: Brand: KIRWAN

## (undated) DEVICE — PAD SACRAL SPAN AID

## (undated) DEVICE — SUTURE CHROMIC GUT 2-0 CT-2

## (undated) DEVICE — DRAPE C-ARM UNIVERSAL

## (undated) DEVICE — SUTURE VICRYL 3-0 RB-1

## (undated) DEVICE — STERILE SYNTHETIC POLYISOPRENE POWDER-FREE SURGICAL GLOVES WITH HYDROGEL COATING: Brand: PROTEXIS

## (undated) DEVICE — GOWN SURG AERO CHROME XXL

## (undated) DEVICE — 3M™ RED DOT™ MONITORING ELECTRODE WITH FOAM TAPE AND STICKY GEL, 50/BAG, 20/CASE, 72/PLT 2570: Brand: RED DOT™

## (undated) DEVICE — CHLORAPREP 26ML APPLICATOR

## (undated) DEVICE — DRILL: Brand: OASYS

## (undated) DEVICE — ENDOSCOPY PACK - LOWER: Brand: MEDLINE INDUSTRIES, INC.

## (undated) DEVICE — CATH IV 14G X 5-1/4 ANGIO

## (undated) DEVICE — GLOVE BIOGEL ORTHO SZ 8

## (undated) DEVICE — KENDALL SCD EXPRESS SLEEVES, THIGH LENGTH, MEDIUM: Brand: KENDALL SCD

## (undated) DEVICE — DRILL SRG OIL CRTDG MAESTRO

## (undated) DEVICE — LAMINECTOMY CDS: Brand: MEDLINE INDUSTRIES, INC.

## (undated) DEVICE — KENDALL SCD EXPRESS SLEEVES, KNEE LENGTH, MEDIUM: Brand: KENDALL SCD

## (undated) DEVICE — SUTURE VICRYL 2-0 CT-2

## (undated) DEVICE — DRAPE TABLE COVER 44X90 TC-10

## (undated) DEVICE — 3M(TM) TEGADERM(TM) TRANSPARENT FILM DRESSING FRAME STYLE 9505W: Brand: 3M™ TEGADERM™

## (undated) DEVICE — ABSORBABLE HEMOSTAT (OXIDIZED REGENERATED CELLULOSE, U.S.P.): Brand: SURGICEL

## (undated) DEVICE — Device: Brand: DEFENDO AIR/WATER/SUCTION AND BIOPSY VALVE

## (undated) DEVICE — NON-ADHERENT PAD PREPACK: Brand: TELFA

## (undated) DEVICE — MARKER SKIN 2 TIP

## (undated) DEVICE — BANDAGE ROLL,100% COTTON, 6 PLY, LARGE: Brand: KERLIX

## (undated) DEVICE — LIGHT HANDLE

## (undated) DEVICE — BREAST-HERNIA-PORT CDS-LF: Brand: MEDLINE INDUSTRIES, INC.

## (undated) DEVICE — GOWN,SIRUS,FABRIC-REINFORCED,X-LARGE: Brand: MEDLINE

## (undated) DEVICE — 1840 FOAM BLOCK NEEDLE COUNTER: Brand: DEVON

## (undated) DEVICE — SYRINGE 10ML LL TIP

## (undated) DEVICE — FORCEP BIOPSY RJ4 LG CAP W/ND

## (undated) DEVICE — #15 STERILE STAINLESS BLADE: Brand: STERILE STAINLESS BLADES

## (undated) DEVICE — SUTURE VICRYL 0 CT-1

## (undated) DEVICE — DISTRACTION SCREW: Brand: MAYFIELD®

## (undated) DEVICE — PROXIMATE SKIN STAPLERS (35 WIDE) CONTAINS 35 STAINLESS STEEL STAPLES (FIXED HEAD): Brand: PROXIMATE

## (undated) DEVICE — POWERLOC MAX 20G X 1 INCH WITH Y-SITE: Brand: PORT ACCESS NEEDLE

## (undated) DEVICE — ALCOHOL 70% 4 OZ

## (undated) DEVICE — SUTURE CHROMIC GUT 2-0 SH

## (undated) DEVICE — GAMMEX® NON-LATEX PI TEXTURED SIZE 8, STERILE POLYISOPRENE POWDER-FREE SURGICAL GLOVE: Brand: GAMMEX

## (undated) DEVICE — #11 STERILE BLADE: Brand: POLYMER COATED BLADES

## (undated) DEVICE — BANDAGE ELASTIC ACE 3IN STERIL

## (undated) DEVICE — VIOLET BRAIDED (POLYGLACTIN 910), SYNTHETIC ABSORBABLE SUTURE: Brand: COATED VICRYL

## (undated) DEVICE — SUTURE PDS II 1 CT-1

## (undated) DEVICE — CASED DISP BIPOLAR CORD

## (undated) DEVICE — FILTERLINE NASAL ADULT O2/CO2

## (undated) DEVICE — UPPER EXTREMITY CDS-LF: Brand: MEDLINE INDUSTRIES, INC.

## (undated) DEVICE — STANDARD HYPODERMIC NEEDLE,POLYPROPYLENE HUB: Brand: MONOJECT

## (undated) DEVICE — SUTURE VICRYL 3-0 SH

## (undated) DEVICE — SCD SLEEVE KNEE HI BLEND

## (undated) DEVICE — INTENDED TO BE USED TO OCCLUDE, RETRACT AND IDENTIFY ARTERIES, VEINS, TENDONS AND NERVES IN SURGICAL PROCEDURES: Brand: STERION®  VESSEL LOOP

## (undated) DEVICE — GAUZE SPONGES,USP TYPE VII GAUZE, 12 PLY: Brand: CURITY

## (undated) DEVICE — POOLE SUCTION HANDLE: Brand: CARDINAL HEALTH

## (undated) DEVICE — POWERPORT CLEARVUE SLIM WITH SMOOTH SEPTUM, 8F POLYURETHANE CATHETER, SUTURE PLUGS, INTERMEDIATE KIT
Type: IMPLANTABLE DEVICE | Site: CHEST | Status: NON-FUNCTIONAL
Brand: POWERPORT CLEARVUE

## (undated) DEVICE — SUTURE PROLENE 2-0 SH

## (undated) DEVICE — GLOVE RADIATION SZ 7-1/2

## (undated) DEVICE — 3M(TM) MICROPORE TAPE DISPENSER 1535-2: Brand: 3M™ MICROPORE™

## (undated) DEVICE — REM POLYHESIVE ADULT PATIENT RETURN ELECTRODE: Brand: VALLEYLAB

## (undated) DEVICE — DRAPE,THYROID,SOFT,STERILE: Brand: MEDLINE

## (undated) DEVICE — SKIN AFFIX .4ML

## (undated) DEVICE — GAUZE SPONGES,8 PLY: Brand: CURITY

## (undated) DEVICE — Device: Brand: PLUMEPEN

## (undated) DEVICE — C-ARM: Brand: UNBRANDED

## (undated) DEVICE — MAYFIELD® DISPOSABLE ADULT SKULL PIN (PLASTIC BASE): Brand: MAYFIELD®

## (undated) DEVICE — 3.0MM PRECISION NEURO (MATCH HEAD)

## (undated) DEVICE — FORCEP RADIAL JAW 4

## (undated) DEVICE — GLOVE BIOGEL M SURG SZ 8

## (undated) DEVICE — SOLUTION SURG DURA PREP HAZMAT

## (undated) DEVICE — DRAPE HALF 40X58 DYNJP2410

## (undated) NOTE — MR AVS SNAPSHOT
After Visit Summary   3/10/2017    Sallie Wharton    MRN: [id number]           Diagnoses this Visit     Osteoporosis    -  Primary     Senile osteoporosis           Allergies     Iodine (Topical) Hives, Tongue Swelling    Fish iodine       Your Lisseth Side effects that usually do not require medical attention (report to your doctor or health care professional if they continue or are bothersome):  · bone, joint, or muscle pain  · constipation  · diarrhea  · fever  · hair loss  · irritation at site where an unborn child. Talk to your health care professional or pharmacist for more information. You should make sure that you get enough calcium and vitamin D while you are taking this medicine.  Discuss the foods you eat and the vitamins you take with your hea procedure. Please avoid the use of body moisturizing lotions on the day of your test.  Payment for this screening exam is due upon arrival at registration. Payment may be made by cash, personal check, or major credit card. [address]  [address] [address]

## (undated) NOTE — LETTER
Kiara Gillis [address]  [address] [facility] [address] [facility]  Authorization for Surgical Operation and Procedure     Date:___________                                                                                                         Time:__________ 4.   Should the need arise during my operation or immediate post-operative period, I also consent to the administration of blood and/or blood products.   Further, I understand that despite careful testing and screening of blood or blood products by lm 8.   I recognize that in the event my procedure results in extended X-Ray/fluoroscopy time, I may develop a skin reaction. 9.  If I have a Do Not Attempt Resuscitation (DNAR) order in place, that status will be suspended while in the operating room, proc 1. IAni agree to be cared for by an anesthesiologist, who is specially trained to monitor me and give me medicine to put me to sleep or keep me comfortable during my procedure    I understand that my anesthesiologist is not an employee or agen 5. My doctor has explained to me other choices available to me for my care (alternatives).   6. Pregnant Patients (“epidural”):  I understand that the risks of having an epidural (medicine given into my back to help control pain during labor), include itchi

## (undated) NOTE — MR AVS SNAPSHOT
[facility] [address], [address] [id number] [phone]               Thank you for choosing us for your health care visit with Addy Ying PA-C.   We are glad to serve you and happy to provide you with this This medicine is not approved for use in children. What side effects may I notice from receiving this medicine?   Side effects that you should report to your doctor or health care professional as soon as possible:  · allergic reactions like skin rash, itch · receiving corticosteroids like dexamethasone or prednisone  · an unusual or allergic reaction to zoledronic acid, other medicines, foods, dyes, or preservatives  · pregnant or trying to get pregnant  · breast-feeding  What should I watch for while using RALOXIFENE (ral OX i feen) reduces the amount of calcium lost from bones. It is used to treat and prevent osteoporosis in women who have experienced menopause. How should I use this medicine? Take this medicine by mouth with a glass of water.  Follow the What should I watch for while using this medicine? Visit your doctor or health care professional for regular checks on your progress.  Do not stop taking this medicine except on the advice of your doctor or health care professional.  You should make sure y Commonly known as:  ESTRACE           Melatonin 5 MG Tabs   Take 1 tablet by mouth nightly. Zolpidem Tartrate 10 MG Tabs   Take 1 tablet (10 mg total) by mouth nightly as needed.    Commonly known as:  Ivelisse Winchester My

## (undated) NOTE — LETTER
[facility]  Phone: [phone]  Right Fax: [phone]  [facility] 20 Danny Velez RN Date: 19    Patient Name: Federico Ponderosa  Surgery Date: 2019    CSN: [id number]  Medical Record: [id number]   :

## (undated) NOTE — Clinical Note
Aj Mills,    She will finish chemoRT on 1/8/21. Her CEA has normalized. I ordered a PET/CT for 12 weeks out. I asked her to follow up with you for her JOSHUA.  Thanks

## (undated) NOTE — LETTER
22    Patient: Sohail Troncoso  : 1960 Visit date: 11/3/2022    Dear  Macky Collet, MD    Thank you for referring Sohail Troncoso to my practice. Please find my assessment and plan below. Assessment   Adenosquamous carcinoma of anus (HCC)  (primary encounter diagnosis)  Anal cancer ([facility] [address]  Adenomatous polyp of colon, unspecified part of colon      Plan   Sohail Troncoso is a 57year old female who was found to have T2N0 adenosquamous carcinoma. She underwent excision on 10/14/20. She completed chemoradiation on 21. She is approximately 8 months post treatment. CEA 0.4 on 2022. Pathology report still listed as a poorly differentiated squamous cancer, the patient however and other notes, indicate this was found to eventually be an adenosquamous cancer of the anus. It was treated with short course chemotherapy associated with radiation therapy. The patient states she does have a prior history of colon polyps. They were noted on colonoscopy by Dr. Edie Morris on May 5, 2015. None were malignant, there were only adenomas present. The patient has no current bright red blood per rectum or melena. She states she still gets some mucus in the stool and narrowing of the stool that she states is related to her treatment of her carcinoma of the anus. She has no abdominal pain or distention. She has not suffered weight loss as a symptom. She has never been diagnosed with Crohn disease, ulcerative colitis, or irritable bowel syndrome. Her paternal grandmother had colon cancer at age 48. Her maternal aunts and mother have had colon polyps. The same maternal grandmother that had colon cancer also had cancer of the uterus, and breast cancer. The patient herself has not had any prior abdominal operations. She has never had heart attack, stroke, heart murmur, heart valve replacement, or cardiac arrhythmia. She is not on any blood thinners.     Clinical exam reveals the anus to have a right lateral scar. There are no signs of recurrent or residual ulcerations or cancer. The region is soft. There are no palpable lesions within the rectovaginal septum. The anal sphincter complex is free of any disease or nodules. There are no visible radiation changes at this point which is slightly unusual for her form of treatment. Sphincter tone is 2/4 basal, 2/4 maximum squeeze pressure. There is a minimal anterior rectocele. There is no other evidence of proctitis or Crohn's disease. This patient appears stable and free of any evidence of recurrent or residual disease at the anus. I will be performing colonoscopy on this patient. She is currently scheduled for colonoscopy December 16, 2022. I will also be seeing this patient every 3 months for the first 3 years after her original treatment, then every 6 months thereafter out to 5 years. We will then see her once yearly.     She wants me to take over her colonoscopy and transfer care here at [facility].        Sincerely,       Geri Ceballos MD   CC: MD Amarjit Vazquez MD

## (undated) NOTE — Clinical Note
Surgery:  C4/5, C5/6 anterior cervical discectomy and fusion Admit date: 6/4/18 D/c date: 6/6/18 Surgeon: Srinivasa Rushing

## (undated) NOTE — LETTER
Kiara Gillis [address]  [address] [facility] [address] [facility]  Authorization for Surgical Operation and Procedure     Date:___________                                                                                                         Time:__________ occur: fever and allergic reactions, hemolytic reactions, transmission of diseases such as Hepatitis, AIDS and Cytomegalovirus (CMV) and fluid overload.   In the event that I wish to have an autologous transfusion of my own blood, or a directed donor transf applicable recovery period ends for purposes of reinstating the DNAR order.   10. Patients having a sterilization procedure: I understand that if the procedure is successful the results will be permanent and it will therefore be impossible for me to insemin me medicine and do additional procedures as necessary. Some examples are: Starting or using an “IV” to give me medicine, fluids or blood during my procedure, and having a breathing tube placed to help me breathe when I’m asleep (intubation).  In the event t but potential complications include headache, bleeding, infection, seizure, irregular heart rhythms, and nerve injury.     I can change my mind about having anesthesia services at any time before I get the medicine.    ______________________________________

## (undated) NOTE — LETTER
Notifier: Pigit       Patient Name: Hina Martin       Identification Number: [id number]                          Advance Beneficiary Notice of Noncoverage (ABN)   NOTE:  If Medicare doesn’t pay for D. Items/service(s) below, you may have to pay.  Medicare does not pay for everything, even some care that you or your health care provider have good reason to think you need. We expect Medicare may not pay for the D. items/service(s) below.  Items or Services  Bilateral De Quervain injections Reason Medicare May Not Pay: Estimated Cost   __EKG ($129.00)  __Pap smear ($48.23) __Pelvic/Breast ($65.00)  __ Ear Irrigation ($149)  _X_ Injection(s)  ___ Tdap ($70)       ___ Meningitis ($206)   __Prevnar ($285)  ___ Td ($51)            ___Shingrix ($215)        __Prevnar 20 ($309)  ___ Hep A ($156)   ___Prolia ($1827.00)     __ Xiaflex ($              )   ___ Hep B ($167)      __Pneumovax ($155)                                            ___ Vaccine Administration ($31)   __ Medicare does not cover this service      __ Medicare may not pay for this   item/service for your condition     __ Medicare may not pay for this item/service as often as this        WHAT YOU NEED TO DO NOW:  Read this notice, so you can make an informed decision about your care.  Ask us any questions that you may have after you finish reading.  Choose an option below about whether to receive the D. item/service(s)  listed above.  Note: If you choose Option 1 or 2, we may help you to use any other insurance that you might have, but Medicare cannot require us to do this.  OPTIONS: Check only one box.  We cannot choose a box for you.   OPTION 1. I want the D. item/service(s) listed above. You may ask to be paid now, but I also want Medicare billed for an official decision on payment, which is sent to me on a Medicare Summary Notice (MSN). I understand that if Medicare doesn’t pay, I am responsible for payment, but I can appeal to Medicare by  following the directions on the MSN. If Medicare does pay, you will refund any payments I made to you, less co-pays or deductibles.  OPTION 2. I want the D. item/service(s) listed above, but do not bill Medicare. You may ask to be paid now as I am responsible for payment. I cannot appeal if Medicare is not billed.  OPTION 3. I don't want the D. item/service(s) listed above. I understand with this choice I am not responsible for payment, and I cannot appeal to see if Medicare would pay.    H. Additional Information:    This notice gives our opinion, not an official Medicare decision. If you have other questions on this notice or Medicare billing, call 1-800-MEDICARE (1-588.312.2743/TTY: 1-975.939.7264). Signing below means that you have received and understand this notice. You also receive a copy.  Signature: Date:       You have the right to get Medicare information in an accessible format, like large print, Braille, or audio. You also have the right to file a complaint if you feel you’ve been discriminated against. Visit Medicare.gov/about- us/kmtkjwprfghhk-khmupqhlfzsjaucsp-keassg.  According to the Paperwork Reduction Act of 1995, no persons are required to respond to a collection of information unless it displays a valid OMB control number. The valid OMB control number for this information collection is 2374-0113. The time required to complete this information collection is estimated to average 7 minutes per response, including the time to review instructions, search existing data resources, gather the data needed, and complete and review the information collection. If you have comments concerning the accuracy of the time estimate or suggestions for improving this form, please write to: CMS, [facility] [employer]     [place] Attn: [employer] Reports Clearance Officer, [place], [place] [address].  Form CMS-R-131 (Exp. 1/31/2026) Form Approved OMB No. [id number]

## (undated) NOTE — LETTER
WHERE IS YOUR PAIN NOW?  Lola the areas on your body where you feel the described sensations.  Use the appropriate symbol.  Lola the areas of radiation.  Include all affected areas.  Just to complete the picture, please draw in the face.     ACHE:  ^ ^ ^   NUMBNESS:  0000   PINS & NEEDLES:  = = = =                              ^ ^ ^                       0000              = = = =                                    ^ ^ ^                       0000            = = = =      BURNING:  XXXX   STABBING: ////                  XXXX                ////                         XXXX          ////     Please lola the line below indicating your degree of pain right now  with 0 being no pain 10 being the worst pain possible.                                         0             1             2              3             4              5              6              7             8             9             10         Patient Signature:

## (undated) NOTE — LETTER
23    Patient: Karen Barraza  : 1960 Visit date: 2023    Dear  Cecelia Casillas MD    Thank you for referring Karen Barraza to my practice. Please find my assessment and plan below. 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 She underwent excision on 10/14/20. She completed chemoradiation on 21. Pathology report states this was a poorly differentiated squamous cancer, other notes indicate this was found to eventually be an adenosquamous cancer of the anus. It was treated with short course chemotherapy associated with radiation therapy. The patient states she does have a prior history of colon polyps. The patient has no complaints of anal incontinence. She has mucus in the stool. She has to wear a pad. She states she has accidents even with solid stool. The patient had a normal spontaneous vaginal delivery. She states she had greater than 100 stitches to repair. The patient states her primary problem is that she cannot feel when bowel movements are present. She has lost sensation to the anus and rectum. She is unaware when stool was passing. She also has new onset of bilateral lower extremity edema. The primary care service is asking us to look into this for the patient. The patient has no real varicose veins. She has received the radiation to the pelvic area. This is new onset. She has no cardiac history that she is aware of. She has never had a DVT or pulmonary embolus. She has no clotting disorders. Clinical exam of the anus including anoscopy reveals her to have 1/4 basal tone, 2/4 maximum squeeze pressure. There are no significant signs of radiation dermatitis.   There is a moderate anterior rectocele present. There are no external hemorrhoids. The patient does have a positive anal wink, and a weak cough reflex. There is some very minimal residual radiation proctitis right at the dentate line. There is no evidence of recurrence of her adenosquamous carcinoma of the anus. There are no HPV lesions, AIN lesions, ulcerations, or other suspicious lesions. Rectovaginal septum shows no mass lesions, the anal sphincter circumferentially shows no mass lesions. I took the opportunity today to do anal and rectal biofeedback during my examination. I taught the patient 2 separate exercises including flicks, and long contractions. She will practice these up to 10 times daily. Clinical exam of the lower extremities reveals no significant varicosities on the calf regions bilaterally. There is some very slight nonpitting edema to the lower extremities bilaterally. There are no ulcerations. There is no cellulitis. There are good peripheral pulses. Regarding the anal sphincter incontinence, the patient was taught the anal and rectal biofeedback. I also spent a significant amount of time explaining to her bowel training. I gave her an instruction sheet regarding this as well as the instructions. We will check back with her on May 15, 2023, at her next scheduled appointment. This should greatly help her with her anal incontinence. There are no sphincter defects that would require surgical repair at this time. Regarding the lymphedema, there are no external varicosities, however incompetent valves cannot be ruled out. We will do a bilateral lower extremity varicose vein evaluation Doppler examination. If it is normal, then the edema is not secondary to varicosities. If there is abnormalities, she should make a follow-up appointment after she gets results on \"MyChart\". Regarding her adenosquamous cancer of the anus, no recurrence is identified.   We will see her again in 3 months for further care and treatment.            Sincerely,       Brianna Locke MD   CC: MD Maikol Sutton MD

## (undated) NOTE — Clinical Note
Hi    PET/CT is scheduled for 10/9/20. She was wondering if she could see you sooner than 10/19?     Thanks

## (undated) NOTE — LETTER
AUTHORIZATION FOR SURGICAL OPERATION OR OTHER PROCEDURE    1. I hereby authorize Dr. Husam Arreola and the [facility] staff assigned to my case to perform the following operation and/or procedure at the [facility]:    Bilateral De Quervain injections    2.  My physician has explained the nature and purpose of the operation or other procedure, possible alternative methods of treatment, the risks involved, and the possibility of complication to me.  I acknowledge that no guarantee has been made as to the result that may be obtained.  3.  I recognize that, during the course of this operation, or other procedure, unforseen conditions may necessitate additional or different procedure than those listed above.  I, therefore, further authorize and request that the above named physician, his/her physician assistants or designees perform such procedures as are, in his/her professional opinion, necessary and desirable.  4.  Any tissue or organs removed in the operation or other procedure may be disposed of by and at the discretion of the [facility] staff and [facility].  5.  I understand that in the event of a medical emergency, I will be transported by local paramedics to [facility] or other hospital emergency department.  6.  I certify that I have read and fully understand the above consent to operation and/or other procedure.    7.  I acknowledge that my physician has explained sedation/analgesia administration to me including the risks and benefits.  I consent to the administration of sedation/analgesia as may be necessary or desirable in the judgement of my physician.    Witness signature: ___________________________________________________ Date:  ______/______/_____                    Time:  ________ A.M.  P.M.       Patient Name:  Hina Martin  8/27/1960  [id number]         Patient signature:  ___________________________________________________                 Statement of  Physician  My signature below affirms that prior to the time of the procedure, I have explained to the patient and/or his/her guardian, the risks and benefits involved in the proposed treatment and any reasonable alternative to the proposed treatment.  I have also explained the risks and benefits involved in the refusal of the proposed treatment and have answered the patient's questions.                        Date:  ______/______/_______  Provider                      Signature:  __________________________________________________________       Time:  ___________ A.M    P.M.

## (undated) NOTE — MR AVS SNAPSHOT
[facility] [address], [address] [phone]               Thank you for choosing us for your health care visit with Peter Jones PA-C.   We are glad to serve you and happy to provide you with this

## (undated) NOTE — LETTER
20    [address]   1960    RE: [address]       : 1960  Dear Dr. Blanche Gomez,    This letter is to inform you that your patient is being scheduled for surgery with Dr. Tori Botello on 20 at [facility]. We have asked the patie

## (undated) NOTE — LETTER
Printed: 10/26/2020    Patient Name: Johanne Vaca  : 1960   Medical Record #: [id number]    Consent to [address] [place] [address], understand that I have been diagnosed with anal cancer.     I understand that the treatment suggested I have had the chance to ask questions about this treatment, and my questions have been answered to my satisfaction. I understand that I can contact my oncologist, or my Cancer Care Team at any time if I have questions, by calling [phone].    Addition

## (undated) NOTE — LETTER
11/23/20    Dear Quinton Sanabria MD,    I am seeing Keyonna Bynum in the office for postoperative evaluation and treatment.        Assessment   Follow-up examination  (primary encounter diagnosis)  Anal cancer [facility])    Plan   Patient is doing well stat

## (undated) NOTE — Clinical Note
Dear Kelly,  I had the opportunity to see your patient Hina Martin recently. I appreciate your confidence in me to care for your patients. Please feel free call me with any questions at [phone] or contact me through Epic.  Sincerely, Moises Arreola MD Board Certified, Physical Medicine and Rehabilitation Specializing in Sports Medicine, Spine Medicine and Electrodiagnostic Medicine [facility]  Dr. Johnson